# Patient Record
Sex: MALE | Race: BLACK OR AFRICAN AMERICAN | NOT HISPANIC OR LATINO | Employment: FULL TIME | URBAN - METROPOLITAN AREA
[De-identification: names, ages, dates, MRNs, and addresses within clinical notes are randomized per-mention and may not be internally consistent; named-entity substitution may affect disease eponyms.]

---

## 2023-04-18 LAB
ALANINE AMINOTRANSFERASE (SGPT) (U/L) IN SER/PLAS: 25 U/L (ref 10–52)
ALBUMIN (G/DL) IN SER/PLAS: 4 G/DL (ref 3.4–5)
ALKALINE PHOSPHATASE (U/L) IN SER/PLAS: 104 U/L (ref 33–136)
ANION GAP IN SER/PLAS: 12 MMOL/L (ref 10–20)
ASPARTATE AMINOTRANSFERASE (SGOT) (U/L) IN SER/PLAS: 19 U/L (ref 9–39)
BILIRUBIN TOTAL (MG/DL) IN SER/PLAS: 0.6 MG/DL (ref 0–1.2)
CALCIUM (MG/DL) IN SER/PLAS: 9.3 MG/DL (ref 8.6–10.6)
CARBON DIOXIDE, TOTAL (MMOL/L) IN SER/PLAS: 29 MMOL/L (ref 21–32)
CHLORIDE (MMOL/L) IN SER/PLAS: 104 MMOL/L (ref 98–107)
CREATININE (MG/DL) IN SER/PLAS: 0.96 MG/DL (ref 0.5–1.3)
ESTIMATED AVERAGE GLUCOSE FOR HBA1C: 166 MG/DL
GFR MALE: 90 ML/MIN/1.73M2
GLUCOSE (MG/DL) IN SER/PLAS: 155 MG/DL (ref 74–99)
HEMOGLOBIN A1C/HEMOGLOBIN TOTAL IN BLOOD: 7.4 %
POTASSIUM (MMOL/L) IN SER/PLAS: 4.4 MMOL/L (ref 3.5–5.3)
PROTEIN TOTAL: 6.7 G/DL (ref 6.4–8.2)
SODIUM (MMOL/L) IN SER/PLAS: 141 MMOL/L (ref 136–145)
UREA NITROGEN (MG/DL) IN SER/PLAS: 12 MG/DL (ref 6–23)

## 2023-06-01 LAB
PROSTATE SPECIFIC AG (NG/ML) IN SER/PLAS: 4.17 NG/ML (ref 0–4)
TESTOSTERONE (NG/DL) IN SER/PLAS: 171 NG/DL (ref 240–1000)

## 2023-06-16 PROBLEM — M67.88 ACHILLES TENDONOSIS OF RIGHT LOWER EXTREMITY: Status: ACTIVE | Noted: 2023-06-16

## 2023-06-16 PROBLEM — H26.9 CORTICAL CATARACT: Status: ACTIVE | Noted: 2023-06-16

## 2023-06-16 PROBLEM — L60.0 INGROWING TOENAIL: Status: ACTIVE | Noted: 2023-06-16

## 2023-06-16 PROBLEM — N52.9 ERECTILE DYSFUNCTION: Status: ACTIVE | Noted: 2023-06-16

## 2023-06-16 PROBLEM — E78.5 HYPERLIPIDEMIA: Status: ACTIVE | Noted: 2023-06-16

## 2023-06-16 PROBLEM — N47.1 PHIMOSIS: Status: ACTIVE | Noted: 2023-06-16

## 2023-06-16 PROBLEM — R31.0 GROSS HEMATURIA: Status: ACTIVE | Noted: 2023-06-16

## 2023-06-16 PROBLEM — H43.391 VITREOUS FLOATERS OF RIGHT EYE: Status: ACTIVE | Noted: 2023-06-16

## 2023-06-16 PROBLEM — R97.20 ELEVATED PSA: Status: ACTIVE | Noted: 2023-06-16

## 2023-06-16 PROBLEM — H26.9 CATARACT, RIGHT: Status: ACTIVE | Noted: 2023-06-16

## 2023-06-16 PROBLEM — E55.9 VITAMIN D INSUFFICIENCY: Status: ACTIVE | Noted: 2023-06-16

## 2023-06-16 PROBLEM — M25.579 ANKLE PAIN: Status: ACTIVE | Noted: 2023-06-16

## 2023-06-16 PROBLEM — E66.9 OBESITY: Status: ACTIVE | Noted: 2023-06-16

## 2023-06-16 PROBLEM — E66.01 TYPE 2 DIABETES MELLITUS WITH MORBID OBESITY (MULTI): Status: ACTIVE | Noted: 2023-06-16

## 2023-06-16 PROBLEM — M76.60 ACHILLES TENDINITIS: Status: ACTIVE | Noted: 2023-06-16

## 2023-06-16 PROBLEM — E11.9 DIABETES MELLITUS (MULTI): Status: ACTIVE | Noted: 2023-06-16

## 2023-06-16 PROBLEM — H25.12 AGE-RELATED NUCLEAR CATARACT OF LEFT EYE: Status: ACTIVE | Noted: 2023-06-16

## 2023-06-16 PROBLEM — G47.30 SLEEP APNEA: Status: ACTIVE | Noted: 2023-06-16

## 2023-06-16 PROBLEM — I10 HYPERTENSION: Status: ACTIVE | Noted: 2023-06-16

## 2023-06-16 PROBLEM — H61.22 IMPACTED CERUMEN OF LEFT EAR: Status: ACTIVE | Noted: 2023-06-16

## 2023-06-16 PROBLEM — F41.9 ANXIETY: Status: ACTIVE | Noted: 2023-06-16

## 2023-06-16 PROBLEM — E11.69 TYPE 2 DIABETES MELLITUS WITH MORBID OBESITY (MULTI): Status: ACTIVE | Noted: 2023-06-16

## 2023-06-16 PROBLEM — E29.1 HYPOGONADISM MALE: Status: ACTIVE | Noted: 2023-06-16

## 2023-06-16 PROBLEM — H02.889 MEIBOMIAN GLAND DYSFUNCTION (MGD): Status: ACTIVE | Noted: 2023-06-16

## 2023-06-16 PROBLEM — H00.12 CHALAZION OF RIGHT LOWER EYELID: Status: ACTIVE | Noted: 2023-06-16

## 2023-06-16 PROBLEM — G47.33 OSA (OBSTRUCTIVE SLEEP APNEA): Status: ACTIVE | Noted: 2023-06-16

## 2023-06-16 PROBLEM — N40.1 ENLARGED PROSTATE WITH LOWER URINARY TRACT SYMPTOMS (LUTS): Status: ACTIVE | Noted: 2023-06-16

## 2023-06-16 PROBLEM — H00.11 CHALAZION OF RIGHT UPPER EYELID: Status: ACTIVE | Noted: 2023-06-16

## 2023-06-16 PROBLEM — R32 URINARY INCONTINENCE: Status: ACTIVE | Noted: 2023-06-16

## 2023-06-16 PROBLEM — Z98.49 S/P CATARACT SURGERY: Status: ACTIVE | Noted: 2023-06-16

## 2023-06-16 RX ORDER — TAMSULOSIN HYDROCHLORIDE 0.4 MG/1
CAPSULE ORAL
COMMUNITY
Start: 2015-10-09 | End: 2023-11-06 | Stop reason: SDUPTHER

## 2023-06-16 RX ORDER — ASPIRIN 81 MG/1
1 TABLET ORAL DAILY
COMMUNITY
Start: 2013-12-10

## 2023-06-16 RX ORDER — INSULIN ASPART 100 [IU]/ML
INJECTION, SOLUTION INTRAVENOUS; SUBCUTANEOUS
COMMUNITY
Start: 2016-01-20

## 2023-06-16 RX ORDER — SILDENAFIL 50 MG/1
TABLET, FILM COATED ORAL
Status: ON HOLD | COMMUNITY
Start: 2020-08-31 | End: 2024-01-08 | Stop reason: ALTCHOICE

## 2023-06-16 RX ORDER — INSULIN GLARGINE 100 [IU]/ML
INJECTION, SOLUTION SUBCUTANEOUS
COMMUNITY
Start: 2013-12-10 | End: 2024-02-13 | Stop reason: SDUPTHER

## 2023-06-16 RX ORDER — SEMAGLUTIDE 2.68 MG/ML
INJECTION, SOLUTION SUBCUTANEOUS
COMMUNITY
Start: 2022-01-25 | End: 2024-06-06 | Stop reason: SDUPTHER

## 2023-06-16 RX ORDER — LISINOPRIL 20 MG/1
1 TABLET ORAL DAILY
COMMUNITY
Start: 2013-12-10 | End: 2024-02-05 | Stop reason: SDUPTHER

## 2023-06-16 RX ORDER — METFORMIN HYDROCHLORIDE 1000 MG/1
1 TABLET ORAL 2 TIMES DAILY
COMMUNITY
Start: 2013-11-22 | End: 2023-10-12

## 2023-06-16 RX ORDER — SILDENAFIL 100 MG/1
TABLET, FILM COATED ORAL
COMMUNITY
Start: 2022-09-27 | End: 2023-06-23 | Stop reason: SINTOL

## 2023-06-16 RX ORDER — TESTOSTERONE CYPIONATE 200 MG/ML
INJECTION, SOLUTION INTRAMUSCULAR
COMMUNITY
Start: 2014-09-05 | End: 2023-06-23 | Stop reason: ALTCHOICE

## 2023-06-16 RX ORDER — ATORVASTATIN CALCIUM 10 MG/1
1 TABLET, FILM COATED ORAL DAILY
COMMUNITY
Start: 2016-07-12 | End: 2023-06-26 | Stop reason: SDUPTHER

## 2023-06-16 RX ORDER — TADALAFIL 5 MG/1
1 TABLET ORAL DAILY
COMMUNITY
Start: 2023-01-10 | End: 2023-06-23 | Stop reason: ALTCHOICE

## 2023-06-23 ENCOUNTER — OFFICE VISIT (OUTPATIENT)
Dept: PRIMARY CARE | Facility: CLINIC | Age: 63
End: 2023-06-23
Payer: COMMERCIAL

## 2023-06-23 VITALS
RESPIRATION RATE: 16 BRPM | WEIGHT: 197 LBS | HEART RATE: 84 BPM | SYSTOLIC BLOOD PRESSURE: 132 MMHG | HEIGHT: 64 IN | BODY MASS INDEX: 33.63 KG/M2 | DIASTOLIC BLOOD PRESSURE: 78 MMHG | OXYGEN SATURATION: 97 %

## 2023-06-23 DIAGNOSIS — E11.69 TYPE 2 DIABETES MELLITUS WITH MORBID OBESITY (MULTI): Primary | ICD-10-CM

## 2023-06-23 DIAGNOSIS — I10 PRIMARY HYPERTENSION: ICD-10-CM

## 2023-06-23 DIAGNOSIS — E78.2 MIXED HYPERLIPIDEMIA: ICD-10-CM

## 2023-06-23 DIAGNOSIS — E66.01 TYPE 2 DIABETES MELLITUS WITH MORBID OBESITY (MULTI): Primary | ICD-10-CM

## 2023-06-23 DIAGNOSIS — G47.33 OSA (OBSTRUCTIVE SLEEP APNEA): ICD-10-CM

## 2023-06-23 DIAGNOSIS — H43.391 VITREOUS FLOATERS OF RIGHT EYE: ICD-10-CM

## 2023-06-23 DIAGNOSIS — Z00.00 HEALTHCARE MAINTENANCE: ICD-10-CM

## 2023-06-23 PROBLEM — M67.88 ACHILLES TENDONOSIS OF RIGHT LOWER EXTREMITY: Status: RESOLVED | Noted: 2023-06-16 | Resolved: 2023-06-23

## 2023-06-23 PROBLEM — F41.9 ANXIETY: Status: RESOLVED | Noted: 2023-06-16 | Resolved: 2023-06-23

## 2023-06-23 PROBLEM — M76.60 ACHILLES TENDINITIS: Status: RESOLVED | Noted: 2023-06-16 | Resolved: 2023-06-23

## 2023-06-23 PROBLEM — E11.9 DIABETES MELLITUS (MULTI): Status: RESOLVED | Noted: 2023-06-16 | Resolved: 2023-06-23

## 2023-06-23 PROBLEM — M25.579 ANKLE PAIN: Status: RESOLVED | Noted: 2023-06-16 | Resolved: 2023-06-23

## 2023-06-23 PROCEDURE — 3078F DIAST BP <80 MM HG: CPT | Performed by: FAMILY MEDICINE

## 2023-06-23 PROCEDURE — 1036F TOBACCO NON-USER: CPT | Performed by: FAMILY MEDICINE

## 2023-06-23 PROCEDURE — 99214 OFFICE O/P EST MOD 30 MIN: CPT | Performed by: FAMILY MEDICINE

## 2023-06-23 PROCEDURE — 4010F ACE/ARB THERAPY RXD/TAKEN: CPT | Performed by: FAMILY MEDICINE

## 2023-06-23 PROCEDURE — 3051F HG A1C>EQUAL 7.0%<8.0%: CPT | Performed by: FAMILY MEDICINE

## 2023-06-23 PROCEDURE — 3075F SYST BP GE 130 - 139MM HG: CPT | Performed by: FAMILY MEDICINE

## 2023-06-23 ASSESSMENT — ENCOUNTER SYMPTOMS
SORE THROAT: 0
CHEST TIGHTNESS: 0
NERVOUS/ANXIOUS: 0
CONSTIPATION: 0
ARTHRALGIAS: 0
BACK PAIN: 0
BRUISES/BLEEDS EASILY: 0
WEAKNESS: 0
COUGH: 0
BLOOD IN STOOL: 0
CHILLS: 0
SHORTNESS OF BREATH: 0
DYSPHORIC MOOD: 0
DIFFICULTY URINATING: 0
DIZZINESS: 0
ABDOMINAL DISTENTION: 0
EYE REDNESS: 0
ADENOPATHY: 0
FEVER: 0
DYSURIA: 0
DIARRHEA: 0
HEADACHES: 0
APPETITE CHANGE: 0
ABDOMINAL PAIN: 0
EYE PAIN: 0
FATIGUE: 0

## 2023-06-23 NOTE — PROGRESS NOTES
Subjective   Patient ID: Ko Porter is a 63 y.o. male who presents for Follow-up.  Pt is here for f/u Type 2 diabetes.   Pt is usually following low carb diet, and is exercising 0 days per week.  Taking Metformin, Basaglar, Novolog,and Ozempic ( started  1.5 months ago and down 23#).   Doing continuous glucose monitor .   A1c 7.4  Eye exam is overdue, was seeing specialist  Pt does not have foot pain, paresthesias, or numbness in feet. Foot checks daily - yes  .  Following with podiatry -  no .   Denies vision changes, abdominal pain, excessive thirst, frequent urination.     Pt has chronic HTN.  Pt is taking Lisinopril. Tolerating well.  Exercising 0 days per week   Low sodium diet is usually being followed.   Is not monitoring home blood pressures.  Denies HA, vision changes or CP.     Pt has Dyslipidemia.   Lipid panel is overdue to recheck.  Currently taking Atorvastatin and is tolerating well without muscle pains or weakness.     BPH is well controlled with Tamsulosin . Last PSA was higher at 4, to have MRI prostate . Pt has 3 x nightly nocturia. Urinary stream is good and he denies difficulty starting urination or emptying bladder. Also has low T but needs MRI before stating treatment.    For LEO pt is using CPAP, due to have repeat sleep study. Tolerating well without issues. Daytime energy is good.     Review of Systems   Constitutional:  Negative for appetite change, chills, fatigue and fever.   HENT:  Negative for congestion, hearing loss and sore throat.    Eyes:  Negative for pain, redness and visual disturbance.   Respiratory:  Negative for cough, chest tightness and shortness of breath.    Cardiovascular:  Negative for chest pain and leg swelling.   Gastrointestinal:  Negative for abdominal distention, abdominal pain, blood in stool, constipation and diarrhea.   Genitourinary:  Negative for difficulty urinating and dysuria.   Musculoskeletal:  Negative for arthralgias and back pain.   Skin:   "Negative for rash.   Neurological:  Negative for dizziness, weakness and headaches.   Hematological:  Negative for adenopathy. Does not bruise/bleed easily.   Psychiatric/Behavioral:  Negative for dysphoric mood. The patient is not nervous/anxious.        Objective   /78   Pulse 84   Resp 16   Ht 1.626 m (5' 4\")   Wt 89.4 kg (197 lb)   SpO2 97%   BMI 33.81 kg/m²    Physical Exam  Constitutional:       General: He is not in acute distress.     Appearance: Normal appearance. He is not ill-appearing.   HENT:      Head: Normocephalic and atraumatic.      Right Ear: Tympanic membrane, ear canal and external ear normal.      Left Ear: Tympanic membrane, ear canal and external ear normal.      Nose: Nose normal.      Mouth/Throat:      Mouth: Mucous membranes are moist.      Pharynx: No oropharyngeal exudate or posterior oropharyngeal erythema.   Eyes:      Extraocular Movements: Extraocular movements intact.      Conjunctiva/sclera: Conjunctivae normal.      Pupils: Pupils are equal, round, and reactive to light.   Neck:      Vascular: No carotid bruit.   Cardiovascular:      Rate and Rhythm: Normal rate and regular rhythm.      Heart sounds: Normal heart sounds. No murmur heard.  Pulmonary:      Breath sounds: Normal breath sounds. No wheezing, rhonchi or rales.   Abdominal:      General: Bowel sounds are normal. There is no distension.      Palpations: Abdomen is soft. There is no mass.      Tenderness: There is no abdominal tenderness.   Musculoskeletal:         General: No swelling or deformity.      Cervical back: Neck supple. No tenderness.   Lymphadenopathy:      Cervical: No cervical adenopathy.   Skin:     General: Skin is warm and dry.      Findings: No lesion or rash.   Neurological:      Mental Status: He is alert and oriented to person, place, and time.      Sensory: No sensory deficit.      Motor: No weakness.      Coordination: Coordination normal.      Deep Tendon Reflexes: Reflexes normal. "   Psychiatric:         Mood and Affect: Mood normal.         Behavior: Behavior normal.         Judgment: Judgment normal.           Assessment/Plan   Diagnoses and all orders for this visit:  Type 2 diabetes mellitus with morbid obesity (CMS/[facility]) - improving with Ozempic, doing well losing weight. Keep fu with Dr Freeman.  -     TSH with reflex to Free T4 if abnormal; Future  -     Referral to Ophthalmology; Future  -     Hemoglobin A1c; Future  Primary hypertension - well controlled, continue Lisinopril and monitor  -     CBC; Future  -     Urinalysis with Reflex Microscopic; Future  -     Comprehensive Metabolic Panel; Future  Mixed hyperlipidemia - doing well on statin, continue and monitor  -     Lipid Panel; Future  Vitreous floaters of right eye  -     Referral to Ophthalmology; Future  LEO (obstructive sleep apnea)  -     Referral to Adult Sleep Medicine; Future  Ordering CAC score    Follow up in January, 30min physical

## 2023-06-23 NOTE — PROGRESS NOTES
"Subjective   Patient ID: oK Porter is a 63 y.o. male who presents for Follow-up.    HPI     Review of Systems    Objective   /78   Pulse 84   Resp 16   Ht 1.626 m (5' 4\")   Wt 89.4 kg (197 lb)   SpO2 97%   BMI 33.81 kg/m²     Physical Exam    Assessment/Plan          "

## 2023-06-26 ENCOUNTER — TELEPHONE (OUTPATIENT)
Dept: PRIMARY CARE | Facility: CLINIC | Age: 63
End: 2023-06-26
Payer: COMMERCIAL

## 2023-06-26 DIAGNOSIS — I25.10 CORONARY ARTERY DISEASE INVOLVING NATIVE CORONARY ARTERY OF NATIVE HEART WITHOUT ANGINA PECTORIS: Primary | ICD-10-CM

## 2023-06-26 RX ORDER — ATORVASTATIN CALCIUM 10 MG/1
10 TABLET, FILM COATED ORAL DAILY
Qty: 90 TABLET | Refills: 1 | Status: SHIPPED | OUTPATIENT
Start: 2023-06-26 | End: 2024-02-05 | Stop reason: SDUPTHER

## 2023-06-26 NOTE — TELEPHONE ENCOUNTER
----- Message from Enmanuel Lui MD sent at 6/26/2023  7:29 AM EDT -----  Coronary artery calcium score is high which does indicate a higher risk of CV disease (heart attack, stroke). Recommend healthy diet, regular exercise and maintaining healthy blood pressure and cholesterol levels.   Atrovastatin should be increased to 20mg daily and referral done to cardiology  ----- Message -----  From: [employer] - Radiology Results In  Sent: 6/26/2023   7:09 AM EDT  To: nEmanuel Lui MD

## 2023-07-21 LAB
ALANINE AMINOTRANSFERASE (SGPT) (U/L) IN SER/PLAS: 16 U/L (ref 10–52)
ALBUMIN (G/DL) IN SER/PLAS: 4.2 G/DL (ref 3.4–5)
ALBUMIN (MG/L) IN URINE: 9.6 MG/L
ALBUMIN (MG/L) IN URINE: NORMAL
ALBUMIN/CREATININE (UG/MG) IN URINE: 11.4 UG/MG CRT (ref 0–30)
ALBUMIN/CREATININE (UG/MG) IN URINE: NORMAL
ALKALINE PHOSPHATASE (U/L) IN SER/PLAS: 87 U/L (ref 33–136)
ANION GAP IN SER/PLAS: 16 MMOL/L (ref 10–20)
ASPARTATE AMINOTRANSFERASE (SGOT) (U/L) IN SER/PLAS: 13 U/L (ref 9–39)
BILIRUBIN TOTAL (MG/DL) IN SER/PLAS: 0.9 MG/DL (ref 0–1.2)
CALCIUM (MG/DL) IN SER/PLAS: 9.5 MG/DL (ref 8.6–10.6)
CARBON DIOXIDE, TOTAL (MMOL/L) IN SER/PLAS: 25 MMOL/L (ref 21–32)
CHLORIDE (MMOL/L) IN SER/PLAS: 105 MMOL/L (ref 98–107)
CHOLESTEROL (MG/DL) IN SER/PLAS: 121 MG/DL (ref 0–199)
CHOLESTEROL IN HDL (MG/DL) IN SER/PLAS: 31.3 MG/DL
CHOLESTEROL/HDL RATIO: 3.9
CREATININE (MG/DL) IN SER/PLAS: 1.01 MG/DL (ref 0.5–1.3)
CREATININE (MG/DL) IN URINE: 84.4 MG/DL (ref 20–370)
CREATININE (MG/DL) IN URINE: NORMAL
ERYTHROCYTE DISTRIBUTION WIDTH (RATIO) BY AUTOMATED COUNT: 14.6 % (ref 11.5–14.5)
ERYTHROCYTE MEAN CORPUSCULAR HEMOGLOBIN CONCENTRATION (G/DL) BY AUTOMATED: 29.7 G/DL (ref 32–36)
ERYTHROCYTE MEAN CORPUSCULAR VOLUME (FL) BY AUTOMATED COUNT: 88 FL (ref 80–100)
ERYTHROCYTES (10*6/UL) IN BLOOD BY AUTOMATED COUNT: 5.57 X10E12/L (ref 4.5–5.9)
ESTIMATED AVERAGE GLUCOSE FOR HBA1C: 154 MG/DL
GFR MALE: 85 ML/MIN/1.73M2
GLUCOSE (MG/DL) IN SER/PLAS: 173 MG/DL (ref 74–99)
HEMATOCRIT (%) IN BLOOD BY AUTOMATED COUNT: 48.9 % (ref 41–52)
HEMOGLOBIN (G/DL) IN BLOOD: 14.5 G/DL (ref 13.5–17.5)
HEMOGLOBIN A1C/HEMOGLOBIN TOTAL IN BLOOD: 7 %
LDL: 50 MG/DL (ref 0–99)
LEUKOCYTES (10*3/UL) IN BLOOD BY AUTOMATED COUNT: 7.9 X10E9/L (ref 4.4–11.3)
NRBC (PER 100 WBCS) BY AUTOMATED COUNT: 0 /100 WBC (ref 0–0)
PLATELETS (10*3/UL) IN BLOOD AUTOMATED COUNT: 268 X10E9/L (ref 150–450)
POTASSIUM (MMOL/L) IN SER/PLAS: 4.2 MMOL/L (ref 3.5–5.3)
PROTEIN TOTAL: 7.2 G/DL (ref 6.4–8.2)
SODIUM (MMOL/L) IN SER/PLAS: 142 MMOL/L (ref 136–145)
TRIGLYCERIDE (MG/DL) IN SER/PLAS: 199 MG/DL (ref 0–149)
UREA NITROGEN (MG/DL) IN SER/PLAS: 13 MG/DL (ref 6–23)
VLDL: 40 MG/DL (ref 0–40)

## 2023-10-12 DIAGNOSIS — E11.69 TYPE 2 DIABETES MELLITUS WITH MORBID OBESITY (MULTI): Primary | ICD-10-CM

## 2023-10-12 DIAGNOSIS — E66.01 TYPE 2 DIABETES MELLITUS WITH MORBID OBESITY (MULTI): Primary | ICD-10-CM

## 2023-10-12 RX ORDER — CEFAZOLIN SODIUM 2 G/100ML
2 INJECTION, SOLUTION INTRAVENOUS ONCE
Status: CANCELLED | OUTPATIENT
Start: 2023-10-12 | End: 2023-10-12

## 2023-10-12 RX ORDER — METFORMIN HYDROCHLORIDE 1000 MG/1
1000 TABLET ORAL 2 TIMES DAILY
Qty: 180 TABLET | Refills: 3 | Status: SHIPPED | OUTPATIENT
Start: 2023-10-12

## 2023-10-13 ENCOUNTER — ANESTHESIA EVENT (OUTPATIENT)
Dept: OPERATING ROOM | Facility: HOSPITAL | Age: 63
End: 2023-10-13
Payer: COMMERCIAL

## 2023-10-13 ENCOUNTER — ANESTHESIA (OUTPATIENT)
Dept: OPERATING ROOM | Facility: HOSPITAL | Age: 63
End: 2023-10-13
Payer: COMMERCIAL

## 2023-10-13 ENCOUNTER — HOSPITAL ENCOUNTER (OUTPATIENT)
Facility: HOSPITAL | Age: 63
Setting detail: OUTPATIENT SURGERY
Discharge: HOME | End: 2023-10-13
Attending: STUDENT IN AN ORGANIZED HEALTH CARE EDUCATION/TRAINING PROGRAM | Admitting: STUDENT IN AN ORGANIZED HEALTH CARE EDUCATION/TRAINING PROGRAM
Payer: COMMERCIAL

## 2023-10-13 VITALS
WEIGHT: 176.81 LBS | SYSTOLIC BLOOD PRESSURE: 130 MMHG | TEMPERATURE: 97.9 F | RESPIRATION RATE: 20 BRPM | DIASTOLIC BLOOD PRESSURE: 74 MMHG | HEART RATE: 76 BPM | BODY MASS INDEX: 30.19 KG/M2 | HEIGHT: 64 IN | OXYGEN SATURATION: 99 %

## 2023-10-13 DIAGNOSIS — R97.20 PSA ELEVATION: Primary | ICD-10-CM

## 2023-10-13 DIAGNOSIS — R97.20 ELEVATED PROSTATE SPECIFIC ANTIGEN (PSA): ICD-10-CM

## 2023-10-13 LAB
GLUCOSE BLD MANUAL STRIP-MCNC: 104 MG/DL (ref 74–99)
GLUCOSE BLD MANUAL STRIP-MCNC: 181 MG/DL (ref 74–99)

## 2023-10-13 PROCEDURE — 7100000010 HC PHASE TWO TIME - EACH INCREMENTAL 1 MINUTE: Performed by: STUDENT IN AN ORGANIZED HEALTH CARE EDUCATION/TRAINING PROGRAM

## 2023-10-13 PROCEDURE — 2500000005 HC RX 250 GENERAL PHARMACY W/O HCPCS: Performed by: NURSE ANESTHETIST, CERTIFIED REGISTERED

## 2023-10-13 PROCEDURE — 2580000001 HC RX 258 IV SOLUTIONS: Performed by: NURSE ANESTHETIST, CERTIFIED REGISTERED

## 2023-10-13 PROCEDURE — 3700000001 HC GENERAL ANESTHESIA TIME - INITIAL BASE CHARGE: Performed by: STUDENT IN AN ORGANIZED HEALTH CARE EDUCATION/TRAINING PROGRAM

## 2023-10-13 PROCEDURE — 82947 ASSAY GLUCOSE BLOOD QUANT: CPT

## 2023-10-13 PROCEDURE — 2720000007 HC OR 272 NO HCPCS: Performed by: STUDENT IN AN ORGANIZED HEALTH CARE EDUCATION/TRAINING PROGRAM

## 2023-10-13 PROCEDURE — 3600000002 HC OR TIME - INITIAL BASE CHARGE - PROCEDURE LEVEL TWO: Performed by: STUDENT IN AN ORGANIZED HEALTH CARE EDUCATION/TRAINING PROGRAM

## 2023-10-13 PROCEDURE — 88305 TISSUE EXAM BY PATHOLOGIST: CPT | Mod: TC,59,AHULAB | Performed by: STUDENT IN AN ORGANIZED HEALTH CARE EDUCATION/TRAINING PROGRAM

## 2023-10-13 PROCEDURE — 55700 PR PROSTATE NEEDLE BIOPSY ANY APPROACH: CPT | Performed by: STUDENT IN AN ORGANIZED HEALTH CARE EDUCATION/TRAINING PROGRAM

## 2023-10-13 PROCEDURE — 7100000001 HC RECOVERY ROOM TIME - INITIAL BASE CHARGE: Performed by: STUDENT IN AN ORGANIZED HEALTH CARE EDUCATION/TRAINING PROGRAM

## 2023-10-13 PROCEDURE — 3600000007 HC OR TIME - EACH INCREMENTAL 1 MINUTE - PROCEDURE LEVEL TWO: Performed by: STUDENT IN AN ORGANIZED HEALTH CARE EDUCATION/TRAINING PROGRAM

## 2023-10-13 PROCEDURE — 88305 TISSUE EXAM BY PATHOLOGIST: CPT | Performed by: PATHOLOGY

## 2023-10-13 PROCEDURE — A55700 PR BIOPSY OF PROSTATE,NEEDLE/PUNCH: Performed by: NURSE ANESTHETIST, CERTIFIED REGISTERED

## 2023-10-13 PROCEDURE — 3700000002 HC GENERAL ANESTHESIA TIME - EACH INCREMENTAL 1 MINUTE: Performed by: STUDENT IN AN ORGANIZED HEALTH CARE EDUCATION/TRAINING PROGRAM

## 2023-10-13 PROCEDURE — 2500000004 HC RX 250 GENERAL PHARMACY W/ HCPCS (ALT 636 FOR OP/ED): Performed by: NURSE ANESTHETIST, CERTIFIED REGISTERED

## 2023-10-13 PROCEDURE — 7100000002 HC RECOVERY ROOM TIME - EACH INCREMENTAL 1 MINUTE: Performed by: STUDENT IN AN ORGANIZED HEALTH CARE EDUCATION/TRAINING PROGRAM

## 2023-10-13 PROCEDURE — 7100000009 HC PHASE TWO TIME - INITIAL BASE CHARGE: Performed by: STUDENT IN AN ORGANIZED HEALTH CARE EDUCATION/TRAINING PROGRAM

## 2023-10-13 PROCEDURE — 88305 TISSUE EXAM BY PATHOLOGIST: CPT | Mod: TC,SUR,AHULAB | Performed by: STUDENT IN AN ORGANIZED HEALTH CARE EDUCATION/TRAINING PROGRAM

## 2023-10-13 PROCEDURE — A55700 PR BIOPSY OF PROSTATE,NEEDLE/PUNCH: Performed by: STUDENT IN AN ORGANIZED HEALTH CARE EDUCATION/TRAINING PROGRAM

## 2023-10-13 PROCEDURE — 88344 IMHCHEM/IMCYTCHM EA MLT ANTB: CPT | Performed by: PATHOLOGY

## 2023-10-13 RX ORDER — DIPHENHYDRAMINE HYDROCHLORIDE 50 MG/ML
12.5 INJECTION INTRAMUSCULAR; INTRAVENOUS ONCE AS NEEDED
Status: CANCELLED | OUTPATIENT
Start: 2023-10-13

## 2023-10-13 RX ORDER — PROPOFOL 10 MG/ML
INJECTION, EMULSION INTRAVENOUS AS NEEDED
Status: DISCONTINUED | OUTPATIENT
Start: 2023-10-13 | End: 2023-10-13

## 2023-10-13 RX ORDER — OXYCODONE HYDROCHLORIDE 5 MG/1
5 TABLET ORAL EVERY 4 HOURS PRN
Status: CANCELLED | OUTPATIENT
Start: 2023-10-13

## 2023-10-13 RX ORDER — LABETALOL HYDROCHLORIDE 5 MG/ML
5 INJECTION, SOLUTION INTRAVENOUS ONCE AS NEEDED
Status: CANCELLED | OUTPATIENT
Start: 2023-10-13

## 2023-10-13 RX ORDER — SODIUM CHLORIDE, SODIUM LACTATE, POTASSIUM CHLORIDE, CALCIUM CHLORIDE 600; 310; 30; 20 MG/100ML; MG/100ML; MG/100ML; MG/100ML
100 INJECTION, SOLUTION INTRAVENOUS CONTINUOUS
Status: CANCELLED | OUTPATIENT
Start: 2023-10-13

## 2023-10-13 RX ORDER — LIDOCAINE HYDROCHLORIDE 10 MG/ML
0.1 INJECTION, SOLUTION EPIDURAL; INFILTRATION; INTRACAUDAL; PERINEURAL ONCE
Status: CANCELLED | OUTPATIENT
Start: 2023-10-13 | End: 2023-10-13

## 2023-10-13 RX ORDER — PROPOFOL 10 MG/ML
INJECTION, EMULSION INTRAVENOUS CONTINUOUS PRN
Status: DISCONTINUED | OUTPATIENT
Start: 2023-10-13 | End: 2023-10-13

## 2023-10-13 RX ORDER — SODIUM CHLORIDE, SODIUM LACTATE, POTASSIUM CHLORIDE, CALCIUM CHLORIDE 600; 310; 30; 20 MG/100ML; MG/100ML; MG/100ML; MG/100ML
100 INJECTION, SOLUTION INTRAVENOUS CONTINUOUS
Status: DISCONTINUED | OUTPATIENT
Start: 2023-10-13 | End: 2023-10-13 | Stop reason: HOSPADM

## 2023-10-13 RX ORDER — GLYCOPYRROLATE 0.2 MG/ML
INJECTION INTRAMUSCULAR; INTRAVENOUS AS NEEDED
Status: DISCONTINUED | OUTPATIENT
Start: 2023-10-13 | End: 2023-10-13

## 2023-10-13 RX ORDER — ONDANSETRON HYDROCHLORIDE 2 MG/ML
4 INJECTION, SOLUTION INTRAVENOUS ONCE AS NEEDED
Status: CANCELLED | OUTPATIENT
Start: 2023-10-13

## 2023-10-13 RX ORDER — LIDOCAINE HYDROCHLORIDE 10 MG/ML
INJECTION INFILTRATION; PERINEURAL AS NEEDED
Status: DISCONTINUED | OUTPATIENT
Start: 2023-10-13 | End: 2023-10-13

## 2023-10-13 RX ORDER — MIDAZOLAM HYDROCHLORIDE 1 MG/ML
INJECTION, SOLUTION INTRAMUSCULAR; INTRAVENOUS AS NEEDED
Status: DISCONTINUED | OUTPATIENT
Start: 2023-10-13 | End: 2023-10-13

## 2023-10-13 RX ADMIN — PROPOFOL 100 MCG/KG/MIN: 10 INJECTION, EMULSION INTRAVENOUS at 11:14

## 2023-10-13 RX ADMIN — GLYCOPYRROLATE 0.2 MG: 0.2 INJECTION INTRAMUSCULAR; INTRAVENOUS at 11:05

## 2023-10-13 RX ADMIN — SODIUM CHLORIDE, SODIUM LACTATE, POTASSIUM CHLORIDE, AND CALCIUM CHLORIDE: 600; 310; 30; 20 INJECTION, SOLUTION INTRAVENOUS at 11:05

## 2023-10-13 RX ADMIN — MIDAZOLAM HYDROCHLORIDE 2 MG: 1 INJECTION, SOLUTION INTRAMUSCULAR; INTRAVENOUS at 11:05

## 2023-10-13 RX ADMIN — LIDOCAINE HYDROCHLORIDE 100 MG: 10 INJECTION, SOLUTION INFILTRATION; PERINEURAL at 11:14

## 2023-10-13 RX ADMIN — PROPOFOL 50 MG: 10 INJECTION, EMULSION INTRAVENOUS at 11:14

## 2023-10-13 ASSESSMENT — PAIN - FUNCTIONAL ASSESSMENT
PAIN_FUNCTIONAL_ASSESSMENT: 0-10

## 2023-10-13 ASSESSMENT — PAIN SCALES - GENERAL
PAINLEVEL_OUTOF10: 0 - NO PAIN

## 2023-10-13 ASSESSMENT — COLUMBIA-SUICIDE SEVERITY RATING SCALE - C-SSRS
1. IN THE PAST MONTH, HAVE YOU WISHED YOU WERE DEAD OR WISHED YOU COULD GO TO SLEEP AND NOT WAKE UP?: NO
2. HAVE YOU ACTUALLY HAD ANY THOUGHTS OF KILLING YOURSELF?: NO
6. HAVE YOU EVER DONE ANYTHING, STARTED TO DO ANYTHING, OR PREPARED TO DO ANYTHING TO END YOUR LIFE?: NO

## 2023-10-13 NOTE — ANESTHESIA POSTPROCEDURE EVALUATION
Patient: Ko Porter    Procedure Summary       Date: 10/13/23 Room / Location: [facility] OR 01 / Virtual [facility] OR    Anesthesia Start: 1105 Anesthesia Stop: 1141    Procedure: Transperineal Fusion Prostate Biopsy Diagnosis:       Elevated prostate specific antigen (PSA)      (Elevated prostate specific antigen (PSA) [R97.20])    Surgeons: Russell Soni MD Responsible Provider: Cristopher Mcarthur MD    Anesthesia Type: MAC ASA Status: 3            Anesthesia Type: MAC    Vitals Value Taken Time   /74 10/13/23 1230   Temp 36.6 °C (97.9 °F) 10/13/23 1200   Pulse 76 10/13/23 1230   Resp 23 10/13/23 1230   SpO2 99 % 10/13/23 1230       Anesthesia Post Evaluation    Patient location during evaluation: bedside  Level of consciousness: awake  Pain management: adequate  Multimodal analgesia pain management approach  Cardiovascular status: acceptable  Respiratory status: acceptable  Hydration status: acceptable        There were no known notable events for this encounter.

## 2023-10-13 NOTE — H&P
"History Of Present Illness  Ko Porter is a 63 y.o. male presenting with elevated PSA here for transperineal prostate biopsy.     Past Medical History  Past Medical History:   Diagnosis Date    Achilles tendinitis 06/16/2023    Anxiety 06/16/2023    Diabetes mellitus (CMS/HCC) 06/16/2023    GERD (gastroesophageal reflux disease)     Hyperlipidemia     Hypertension        Surgical History  Past Surgical History:   Procedure Laterality Date    APPENDECTOMY  01/22/2016    Appendectomy    COLONOSCOPY  10/02/2013    Complete Colonoscopy    OTHER SURGICAL HISTORY Left 10/30/2020    Cataract surgery        Social History  He reports that he has never smoked. He has never used smokeless tobacco. He reports that he does not currently use alcohol. He reports that he does not use drugs.    Family History  Family History   Problem Relation Name Age of Onset    Diabetes Father      Cancer Maternal Grandfather          Allergies  Patient has no known allergies.    Review of Systems     Physical Exam   RRR  Unlabored breathing  Last Recorded Vitals  Blood pressure 110/62, pulse 56, temperature 36 °C (96.8 °F), temperature source Temporal, resp. rate 15, height 1.626 m (5' 4\"), weight 80.2 kg (176 lb 12.9 oz), SpO2 98 %.    Relevant Results        Prostate MRI with PIRADS 4 lesion and 2 PI RADS 3 lesions. Prostate size 73.9cc       Assessment/Plan   Active Problems:  There are no active Hospital Problems.      Transperineal fusion prostate biopsy           Je Whitlock MD    "

## 2023-10-13 NOTE — PERIOPERATIVE NURSING NOTE
1231: Patient in phase 2, handoff report received from Carlee BRAVO    1235: Patient ambulated to bathroom with RN assistance. Pt voided.    1240: Family at bedside, pt dressed with family assistance    1245: Pt ambulated to bathroom with family assistance. Pt voided and had stool occurrence    1250: Discharge instructions reviewed with patient and family, no further questions at this time.     1308: Peripheral IV removed with no complications.     1313: Patient to main lobby via transport with all belongings in stable condition. Phase 2 complete.

## 2023-10-13 NOTE — DISCHARGE INSTRUCTIONS
DEPARTMENT OF UROLOGY  DISCHARGE INSTRUCTIONS PROSTATE BIOPSY  Outpatient Surgery    C O N F I D E N T I A L   I N F O R M A T I O N    Ko T Johnson        Call [phone] during regular daytime business hours (8:00 am - 5:00 pm) and after 5:00 pm and ask for the Urology Resident with any questions or concerns.      If it is a life-threatening situation, proceed to the nearest emergency department.        Follow-up appointment:  ***     Thank you for the opportunity to care for you today.  Your health and healing are very important to us.  We hope we made you feel as comfortable as possible and are committed to your recovery and continued well-being.      The following is a brief overview of your prostate biopsy today. Some of the information contained on this summary may be confidential.  This information should be kept in your records and should be shared with your regular doctor.    Physicians:   Dr. Soni      Procedure performed: prostate biopsy  Pending results:   prostate pathology results    What to Expect During your Recovery and Home Care  Anesthesia Side Effects   You received anesthesia today.  You may feel sleepy, tired, or have a sore throat.   You may also feel drowsiness, dizziness, or inability to think clearly.  For your safety, do not drive, drink alcoholic beverages, take any unprescribed medication or make any important decisions for 24 hours.  A responsible adult should be with you for 24 hours.        Activity and Recovery    No heavy lifting or strenuous activity for several days. Avoid activities that put pressure on your rectal area. Do not have sex for a few days.      Pain Control  Unfortunately, you may experience pain after your procedure.  Adequate management can include alternative measures to help ease your pain and can include ice packs, and over the counter Tylenol can be taken as prescribed as needed for breakthrough pain. Do not take more then 4,000mg of Tylenol in a 24-hour  period.      Nausea/Vomiting   Clear liquids are best tolerated at first. Start slow, advance your diet as tolerated to normal foods. Avoid spicy, greasy, heavy foods at first. Also, you may feel nauseous or like you need to vomit if you take any type of medication on an empty stomach.  Call your physician if you are unable to eat or drink and have persistent vomiting.    Signs of Bleeding   Minor bleeding or drainage may occur from your rectum however, excessive or consistent bleeding should be reported to your surgeon. Excessive bleeding is defined as blood that is dripping from rectum, soaking through your pants, and is ketchup colored, thick with possible blood clots.  Consistent is defined as bleeding that does not stop. You may have blood in your poop, urine and semen for several weeks.     Treatment/wound care:   It is okay to shower 24 hours after time of surgery.      Signs of Infection  Signs of infection can include fever, burning sensation with urination, frequency, urgency or severe pain.  If you see any of these occur, please contact your doctor's office at [phone].  Any fever higher than 100.4, especially if associated with an ill feeling, abdominal pain, chills, or nausea should be reported to your surgeon.      Assist in bowel movements/urination  Increase fiber in diet  Urination should occur within 6 hours of anesthesia.   Increase water (6 to 8 glasses)  Increase walking   If you have tried these methods and your bladder still feels full and you cannot use the bathroom, please go to your nearest Emergency room.    Additional Instructions:   Try not to strain when going to the bathroom. Do not hold your urine. We will go over your biopsy results at your follow up appointment. It takes 7-10 business days for the results to come back.

## 2023-10-13 NOTE — ANESTHESIA PREPROCEDURE EVALUATION
Patient: Ko Porter    Procedure Information       Date/Time: 10/13/23 1030    Procedure: Transperineal Fusion Prostate Biopsy - [facility]; [id number]    Location: [facility] OR 01 / [facility] OR    Surgeons: Russell Soni MD          Htn t2dm gerd adina no cpap    Relevant Problems   Cardiovascular   (+) Hyperlipidemia   (+) Hypertension      Endocrine   (+) Obesity   (+) Type 2 diabetes mellitus with morbid obesity (CMS/HCC)      Pulmonary   (+) ADINA (obstructive sleep apnea)       Clinical information reviewed:   Tobacco  Allergies  Meds   Med Hx  Surg Hx   Fam Hx  Soc Hx        NPO Detail:  NPO/Void Status  Carbonhydrate Drink Given Prior to Surgery? : N  Date of Last Liquid: 10/12/23  Time of Last Liquid: 2200  Date of Last Solid: 10/12/23  Time of Last Solid: 2200  Last Intake Type: Clear fluids         PHYSICAL EXAM    Anesthesia Plan    ASA 3     MAC     intravenous induction   Anesthetic plan and risks discussed with patient.    Plan discussed with CRNA.

## 2023-10-13 NOTE — OP NOTE
Transperineal Fusion Prostate Biopsy Operative Note     Date: 10/13/2023  OR Location: [facility] A OR    Name: Ko Porter, : 1960, Age: 63 y.o., MRN: [id number], Sex: male    Diagnosis  Pre-op Diagnosis     * Elevated prostate specific antigen (PSA) [R97.20] Post-op Diagnosis     * Elevated prostate specific antigen (PSA) [R97.20]     Procedures    * Transperineal Fusion Prostate Biopsy    Surgeons      * Russell Soni - Primary    Resident/Fellow/Other Assistant:  No surgical staff documented.    Procedure Summary  Anesthesia: Monitor Anesthesia Care  ASA: III  Anesthesia Staff: Anesthesiologist: Cristopher Mcarthur MD  CRNA: EFRA Cardenas-CRNA  Estimated Blood Loss: minimal            Anesthesia Record               Intraprocedure I/O Totals          Intake    .00 mL    Propofol Drip 16.84 mL    The total shown is the total volume documented since Anesthesia Start was filed.    Total Intake 916.84 mL          Specimen:   ID Type Source Tests Collected by Time   1 : right posterior apex Tissue PROSTATE, BIOPSY, RIGHT LATERAL APEX SURGICAL PATHOLOGY EXAM Russell Soni MD 10/13/2023 1119   2 : right posterior base Tissue PROSTATE, BIOPSY, RIGHT LATERAL BASE SURGICAL PATHOLOGY EXAM Russell Soni MD 10/13/2023 1119   3 : right paramedian apex Tissue PROSTATE, BIOPSY, RIGHT MEDIAL APEX SURGICAL PATHOLOGY EXAM Russell Soni MD 10/13/2023 1048   4 : right paramedian base Tissue PROSTATE, BIOPSY, RIGHT MEDIAL BASE SURGICAL PATHOLOGY EXAM Russell Soni MD 10/13/2023 1048   5 : right lateral Tissue PROSTATE, BIOPSY, RIGHT LATERAL MID SURGICAL PATHOLOGY EXAM Russell Soni MD 10/13/2023 1048   6 : right anterior Tissue PROSTATE, BIOPSY, RIGHT MEDIAL MID SURGICAL PATHOLOGY EXAM Russell Soni MD 10/13/2023 1048   7 : left paramedian apex Tissue PROSTATE, BIOPSY, LEFT MEDIAL APEX SURGICAL PATHOLOGY EXAM Russell Soni MD 10/13/2023 1048   8 : left paramedian base Tissue PROSTATE, BIOPSY, LEFT MEDIAL  BASE SURGICAL PATHOLOGY EXAM Russell Soni MD 10/13/2023 1048   9 : left anterior Tissue PROSTATE, BIOPSY, LEFT MEDIAL MID SURGICAL PATHOLOGY EXAM Russell Soni MD 10/13/2023 1048   10 : left posterior base Tissue PROSTATE, BIOPSY, LEFT LATERAL BASE SURGICAL PATHOLOGY EXAM Russell Soni MD 10/13/2023 1048   11 : left posterior apex Tissue PROSTATE, BIOPSY, LEFT LATERAL APEX SURGICAL PATHOLOGY EXAM Russell Soni MD 10/13/2023 1048   12 : left lateral Tissue PROSTATE, BIOPSY, LEFT LATERAL MID SURGICAL PATHOLOGY EXAM Russell Soni MD 10/13/2023 1048   13 : 1/1 Tissue PROSTATE BIOPSY TARGETED MIGUEL SURGICAL PATHOLOGY EXAM Russell Soni MD 10/13/2023 1048   14 : 1/2 Tissue PROSTATE BIOPSY TARGETED MIGUEL SURGICAL PATHOLOGY EXAM Russell Soni MD 10/13/2023 1048   15 : 1/3 Tissue PROSTATE BIOPSY TARGETED MIGUEL SURGICAL PATHOLOGY EXAM Russell Soni MD 10/13/2023 1048   16 : 1/4 Tissue PROSTATE BIOPSY TARGETED MIGUEL SURGICAL PATHOLOGY EXAM Russell Soni MD 10/13/2023 1048   17 : 2/1 Tissue PROSTATE BIOPSY TARGETED MIGUEL SURGICAL PATHOLOGY EXAM Russell Soni MD 10/13/2023 1048   18 : 2/2 Tissue PROSTATE BIOPSY TARGETED MIGUEL SURGICAL PATHOLOGY EXAM Russell Soni MD 10/13/2023 1048   19 : 2/3 Tissue PROSTATE BIOPSY TARGETED MIGUEL SURGICAL PATHOLOGY EXAM Russell Soni MD 10/13/2023 1048   20 : 2/4 Tissue PROSTATE BIOPSY TARGETED MIGUEL SURGICAL PATHOLOGY EXAM Russell Soni MD 10/13/2023 1048   21 : 3/1 Tissue PROSTATE BIOPSY TARGETED MIGUEL SURGICAL PATHOLOGY EXAM Russell Soni MD 10/13/2023 1048   22 : 3/2 Tissue PROSTATE BIOPSY TARGETED MIGUEL SURGICAL PATHOLOGY EXAM Russell Soni MD 10/13/2023 1048   23 : 3/3 Tissue PROSTATE BIOPSY TARGETED MIGUEL SURGICAL PATHOLOGY EXAM Russell Soni MD 10/13/2023 1048   24 : 3/4 Tissue PROSTATE BIOPSY TARGETED MIGUEL SURGICAL PATHOLOGY EXAM Russell Soni MD 10/13/2023 1048        Staff:   Circulator: Odette Lombardo RN; Morena Sharpe RN  Scrub Person: Israel Bustillo    Preoperative Diagnosis: Elevated PSA;  abnormal prostate findings on MRI; here for prostate biopsy    Postoperative Diagnosis: Same    Operation Performed: MR/TRUS fusion guided biopsy via transperineal approach    Attending: Zachariah    Assistant(s):     Anesthesia: Sedation and Local    Preparation: Betadine    EBL: Minimal     Complications: None     Indications for procedure: This 63 y.o. year old male presents with a history of Elevated PSA.    MRI Findings: 3 separate lesions on prostate MRI    Procedure and Findings:     The rational for transrectal ultrasound and biopsy of the prostate including risk, benefits and alternatives were discussed. These included risk of increased urination, urinary retention, hematuria, hematospermia, and urosepsis, the patient elected to proceed.      The multiparametric MRI data was imported from the radiology PACS system to the behaview biopsy platform. The T2-weighted images were reviewed including the segmented prostate boundary and pre-identified suspicious lesions (ROIs).     A procedure time out confirmed the proper patient, and the procedure informed consent form had been signed by the patient.    The patient was placed in lithotomy position. A injection mixture of lidocaine, marcaine and sodium bicarbonate was used as local anesthetic for the skin of the perineum and to perform a periapical block.     The electromagnetic sensor was attached to the ultrasound probe. The ultrasound transducer was placed into the rectum. Positioning of the probe and sensor within the generated EM-field was confirmed.     A comprehensive TRUS survey was performed with the prostate visualized in both the transverse and sagittal planes. There were benign calcifications seen on ultrasound. In addition to the MRI fusion, there were hypoechoic lesions suspicious for tumor identified on ultrasound.     3D-Rendering with trus image processing and fusion:    In the axial plane, an ultrasound sweep of the prostate was completed from base to  apex. The serial axial image slices were marked by annotating the anterior, posterior, left, right, base and apical points for semi-automatic segmentation of the prostate. Manual adjustments of the prostate US segmentation was performed in the axial, sagittal and coronal views rendering a 3-D data set/US volume.     Initial rotational co-registration was performed by blending MR images that were overlaid on the US images. The urethra, bladder neck, bladder and posterior surface of the prostate were identified on both MRI and US.     The images were rotated to ensure corresponding anatomy was correctly aligned.     Elastic registration was then calculated for use if required.     The MRI and Ultrasound were then registered using co-display of the images verifying that base, apex, left and right sides of the prostate were correctly aligned. Manual corrections were performed where need. Additional co-registration of the internal fiducials including prostatic cyst and the pubis were performed.     Prostate Biopsy:     At this point, accurate co-registration/fusion was confirmed. Ultrasound was used to navigate to the centroid target and lesion volume. The segmented ROIs were targeted and biopsied with ultrasound guidance taking four cores from each target. I was satisfied with the location of the targeted biopsies obtained. An additional 12 core, standard, systematic random biopsy was performed for a total of 24 cores of tissue obtained during this biopsy session.      Disposition  Patient tolerated the procedure well and will follow-up for an outpatient appointment to discuss pathology.      Attending Attestation: I was present and scrubbed for the entire procedure.    Russell Soni  Phone Number: [phone]

## 2023-10-16 ASSESSMENT — PAIN SCALES - GENERAL: PAINLEVEL_OUTOF10: 0 - NO PAIN

## 2023-10-30 LAB
LAB AP ASR DISCLAIMER: NORMAL
LABORATORY COMMENT REPORT: NORMAL
PATH REPORT.FINAL DX SPEC: NORMAL
PATH REPORT.GROSS SPEC: NORMAL
PATH REPORT.RELEVANT HX SPEC: NORMAL
PATH REPORT.TOTAL CANCER: NORMAL

## 2023-11-06 ENCOUNTER — OFFICE VISIT (OUTPATIENT)
Dept: ENDOCRINOLOGY | Facility: CLINIC | Age: 63
End: 2023-11-06
Payer: COMMERCIAL

## 2023-11-06 ENCOUNTER — LAB (OUTPATIENT)
Dept: LAB | Facility: LAB | Age: 63
End: 2023-11-06
Payer: COMMERCIAL

## 2023-11-06 VITALS
DIASTOLIC BLOOD PRESSURE: 68 MMHG | RESPIRATION RATE: 16 BRPM | HEIGHT: 64 IN | WEIGHT: 176 LBS | BODY MASS INDEX: 30.05 KG/M2 | HEART RATE: 72 BPM | SYSTOLIC BLOOD PRESSURE: 120 MMHG

## 2023-11-06 DIAGNOSIS — E11.69 TYPE 2 DIABETES MELLITUS WITH MORBID OBESITY (MULTI): Primary | ICD-10-CM

## 2023-11-06 DIAGNOSIS — I10 PRIMARY HYPERTENSION: ICD-10-CM

## 2023-11-06 DIAGNOSIS — E66.01 TYPE 2 DIABETES MELLITUS WITH MORBID OBESITY (MULTI): ICD-10-CM

## 2023-11-06 DIAGNOSIS — E11.69 TYPE 2 DIABETES MELLITUS WITH MORBID OBESITY (MULTI): ICD-10-CM

## 2023-11-06 DIAGNOSIS — E78.2 MIXED HYPERLIPIDEMIA: ICD-10-CM

## 2023-11-06 DIAGNOSIS — E66.01 TYPE 2 DIABETES MELLITUS WITH MORBID OBESITY (MULTI): Primary | ICD-10-CM

## 2023-11-06 LAB
ALBUMIN SERPL BCP-MCNC: 4.4 G/DL (ref 3.4–5)
ALP SERPL-CCNC: 86 U/L (ref 33–136)
ALT SERPL W P-5'-P-CCNC: 11 U/L (ref 10–52)
ANION GAP SERPL CALC-SCNC: 19 MMOL/L (ref 10–20)
AST SERPL W P-5'-P-CCNC: 9 U/L (ref 9–39)
BILIRUB SERPL-MCNC: 0.6 MG/DL (ref 0–1.2)
BUN SERPL-MCNC: 15 MG/DL (ref 6–23)
CALCIUM SERPL-MCNC: 9.6 MG/DL (ref 8.6–10.6)
CHLORIDE SERPL-SCNC: 102 MMOL/L (ref 98–107)
CO2 SERPL-SCNC: 25 MMOL/L (ref 21–32)
CREAT SERPL-MCNC: 0.99 MG/DL (ref 0.5–1.3)
EST. AVERAGE GLUCOSE BLD GHB EST-MCNC: 151 MG/DL
GFR SERPL CREATININE-BSD FRML MDRD: 87 ML/MIN/1.73M*2
GLUCOSE SERPL-MCNC: 166 MG/DL (ref 74–99)
HBA1C MFR BLD: 6.9 %
POTASSIUM SERPL-SCNC: 4.7 MMOL/L (ref 3.5–5.3)
PROT SERPL-MCNC: 7 G/DL (ref 6.4–8.2)
SODIUM SERPL-SCNC: 141 MMOL/L (ref 136–145)

## 2023-11-06 PROCEDURE — 36415 COLL VENOUS BLD VENIPUNCTURE: CPT

## 2023-11-06 PROCEDURE — 3078F DIAST BP <80 MM HG: CPT | Performed by: INTERNAL MEDICINE

## 2023-11-06 PROCEDURE — 83036 HEMOGLOBIN GLYCOSYLATED A1C: CPT

## 2023-11-06 PROCEDURE — 3051F HG A1C>EQUAL 7.0%<8.0%: CPT | Performed by: INTERNAL MEDICINE

## 2023-11-06 PROCEDURE — 1036F TOBACCO NON-USER: CPT | Performed by: INTERNAL MEDICINE

## 2023-11-06 PROCEDURE — 3074F SYST BP LT 130 MM HG: CPT | Performed by: INTERNAL MEDICINE

## 2023-11-06 PROCEDURE — 99214 OFFICE O/P EST MOD 30 MIN: CPT | Performed by: INTERNAL MEDICINE

## 2023-11-06 PROCEDURE — 80053 COMPREHEN METABOLIC PANEL: CPT

## 2023-11-06 PROCEDURE — 4010F ACE/ARB THERAPY RXD/TAKEN: CPT | Performed by: INTERNAL MEDICINE

## 2023-11-06 RX ORDER — TAMSULOSIN HYDROCHLORIDE 0.4 MG/1
0.8 CAPSULE ORAL DAILY
Qty: 180 CAPSULE | Refills: 3 | Status: ON HOLD | OUTPATIENT
Start: 2023-11-06 | End: 2024-01-08 | Stop reason: ALTCHOICE

## 2023-11-06 RX ORDER — FLASH GLUCOSE SENSOR
KIT MISCELLANEOUS
COMMUNITY
Start: 2023-09-21 | End: 2024-05-28 | Stop reason: SDUPTHER

## 2023-11-06 ASSESSMENT — ENCOUNTER SYMPTOMS
FEVER: 0
SHORTNESS OF BREATH: 0
VOMITING: 0
CHILLS: 0
DIZZINESS: 0
NAUSEA: 0
DIARRHEA: 0
LIGHT-HEADEDNESS: 0

## 2023-11-06 NOTE — PROGRESS NOTES
"Subjective   Patient ID: Ko Porter is a 63 y.o. male who presents for Diabetes (Type 2), Hyperlipidemia, and Hypertension.  HPI  Since last visit continues to do great with sugars since increase in ozempic to 2 mg and add of jardiance.  Some lows in pm.  Current insulin at 46 basal and meal 20 bid.   Feels well. Had recent prostate bx: meeting with uro soon.    Utd with eye exam    Review of Systems   Constitutional:  Negative for chills and fever.   Respiratory:  Negative for shortness of breath.    Gastrointestinal:  Negative for diarrhea, nausea and vomiting.   Endocrine: Negative for cold intolerance and heat intolerance.   Neurological:  Negative for dizziness and light-headedness.       Objective     11/6/2023 9:36 AM    /68   Pulse 72   Resp 16   Weight 79.8 kg (176 lb)   Height 1.626 m (5' 4\")         Physical Exam  Constitutional:       Appearance: Normal appearance. He is overweight.   HENT:      Head: Normocephalic and atraumatic.   Neck:      Thyroid: No thyroid mass, thyromegaly or thyroid tenderness.   Cardiovascular:      Rate and Rhythm: Normal rate and regular rhythm.      Heart sounds: No murmur heard.     No gallop.   Pulmonary:      Effort: Pulmonary effort is normal.      Breath sounds: Normal breath sounds.   Abdominal:      Palpations: Abdomen is soft.      Comments: benign   Neurological:      General: No focal deficit present.      Mental Status: He is alert and oriented to person, place, and time.      Deep Tendon Reflexes: Reflexes are normal and symmetric.   Psychiatric:         Behavior: Behavior is cooperative.         Assessment/Plan   Problem List Items Addressed This Visit             ICD-10-CM    Hyperlipidemia E78.5    Hypertension I10    Type 2 diabetes mellitus with morbid obesity (CMS/[facility]) - Primary E11.69, E66.01   Dm2:   Numbers are great.  Reduce meal insulin to 10 units twice daily (on discussion actually taking approx 15 with meals)  Cmp and A1c today  Htn:  Bp " excellent  Lipids:  Controlled on atorva  Follow up in 4 months

## 2023-11-06 NOTE — PATIENT INSTRUCTIONS
Reduce meal insulin to 10 units twice daily  Call or message with concerns  Keep up the good work with weight

## 2023-11-11 DIAGNOSIS — H26.9 UNSPECIFIED CATARACT: ICD-10-CM

## 2023-11-11 RX ORDER — SEMAGLUTIDE 1.34 MG/ML
1 INJECTION, SOLUTION SUBCUTANEOUS
Qty: 9 ML | Refills: 3 | Status: SHIPPED | OUTPATIENT
Start: 2023-11-11 | End: 2024-02-05 | Stop reason: WASHOUT

## 2023-11-20 ENCOUNTER — OFFICE VISIT (OUTPATIENT)
Dept: UROLOGY | Facility: HOSPITAL | Age: 63
End: 2023-11-20
Payer: COMMERCIAL

## 2023-11-20 DIAGNOSIS — R97.20 ELEVATED PSA: ICD-10-CM

## 2023-11-20 DIAGNOSIS — C61 MALIGNANT NEOPLASM OF PROSTATE (MULTI): Primary | ICD-10-CM

## 2023-11-20 DIAGNOSIS — N52.9 ERECTILE DYSFUNCTION, UNSPECIFIED ERECTILE DYSFUNCTION TYPE: ICD-10-CM

## 2023-11-20 DIAGNOSIS — C61 PROSTATE CANCER (MULTI): ICD-10-CM

## 2023-11-20 PROCEDURE — 3044F HG A1C LEVEL LT 7.0%: CPT | Performed by: STUDENT IN AN ORGANIZED HEALTH CARE EDUCATION/TRAINING PROGRAM

## 2023-11-20 PROCEDURE — 1036F TOBACCO NON-USER: CPT | Performed by: STUDENT IN AN ORGANIZED HEALTH CARE EDUCATION/TRAINING PROGRAM

## 2023-11-20 PROCEDURE — 99214 OFFICE O/P EST MOD 30 MIN: CPT | Performed by: STUDENT IN AN ORGANIZED HEALTH CARE EDUCATION/TRAINING PROGRAM

## 2023-11-20 PROCEDURE — 4010F ACE/ARB THERAPY RXD/TAKEN: CPT | Performed by: STUDENT IN AN ORGANIZED HEALTH CARE EDUCATION/TRAINING PROGRAM

## 2023-11-20 RX ORDER — TADALAFIL 5 MG/1
5 TABLET ORAL DAILY
Qty: 30 TABLET | Refills: 3 | Status: ON HOLD | OUTPATIENT
Start: 2023-11-20 | End: 2024-01-08

## 2023-11-20 NOTE — PROGRESS NOTES
UROLOGIC FOLLOW-UP VISIT     PROBLEM LIST:  1. Erectile dysfunction, unspecified erectile dysfunction type  tadalafil (Cialis) 5 mg tablet      2. Elevated PSA        3. Prostate cancer (CMS/HCC)  NM PET CT prostate PSMA           HISTORY OF PRESENT ILLNESS:   62 y/o male referred from my colleague Dr. Delgado for evalaution of elevated PSA and abdnormal pMRI. Patient has a history of BPH and LUTS. He had a PSA level of 4.17 and underwent a pMRI which revealed both a PIRADS 3 lesion and PIRADS 4 lesion. Patient underwent MR guided prostate biopsy on 10/13/2023 and presents today for pathology results.   Patient denies any fh of prostate cancer. He denies any hematuria or dysuria. Denies any f/c/n/v.        PAST MEDICAL HISTORY:  Past Medical History:   Diagnosis Date    Achilles tendinitis 06/16/2023    Anxiety 06/16/2023    Diabetes mellitus (CMS/HCC) 06/16/2023    GERD (gastroesophageal reflux disease)     Hyperlipidemia     Hypertension        PAST SURGICAL HISTORY:  Past Surgical History:   Procedure Laterality Date    APPENDECTOMY  01/22/2016    Appendectomy    COLONOSCOPY  10/02/2013    Complete Colonoscopy    OTHER SURGICAL HISTORY Left 10/30/2020    Cataract surgery        ALLERGIES:   No Known Allergies     MEDICATIONS:     Current Outpatient Medications:     semaglutide (Ozempic) 1 mg/dose (4 mg/3 mL) pen injector, INJECT 1 MG SUBCUTANEOUSLY WEEKLY, Disp: 9 mL, Rfl: 3    aspirin 81 mg EC tablet, Take 1 tablet (81 mg) by mouth once daily., Disp: , Rfl:     atorvastatin (Lipitor) 10 mg tablet, Take 1 tablet (10 mg) by mouth once daily., Disp: 90 tablet, Rfl: 1    empagliflozin (Jardiance) 10 mg, Take 1 tablet (10 mg) by mouth once daily., Disp: , Rfl:     FreeStyle Reg 2 Sensor kit, USE FOR 14 DAYS AND REPLACE., Disp: , Rfl:     insulin aspart (NovoLOG FlexPen U-100 Insulin) 100 unit/mL (3 mL) pen, Inject under the skin., Disp: , Rfl:     insulin glargine (Basaglar KwikPen U-100 Insulin) 100 unit/mL (3  mL) pen, Inject under the skin once daily., Disp: , Rfl:     lisinopril 20 mg tablet, Take 1 tablet (20 mg) by mouth once daily., Disp: , Rfl:     metFORMIN (Glucophage) 1,000 mg tablet, TAKE 1 TABLET TWICE A DAY, Disp: 180 tablet, Rfl: 3    semaglutide (Ozempic) 2 mg/dose (8 mg/3 mL) pen injector, Inject under the skin., Disp: , Rfl:     sildenafil (Viagra) 50 mg tablet, Take by mouth., Disp: , Rfl:     tamsulosin (Flomax) 0.4 mg 24 hr capsule, Take 2 capsules (0.8 mg) by mouth once daily., Disp: 180 capsule, Rfl: 3      SOCIAL HISTORY:  Patient  reports that he has never smoked. He has never used smokeless tobacco. He reports that he does not currently use alcohol. He reports that he does not use drugs.   Social History     Socioeconomic History    Marital status:      Spouse name: Not on file    Number of children: Not on file    Years of education: Not on file    Highest education level: Not on file   Occupational History    Not on file   Tobacco Use    Smoking status: Never    Smokeless tobacco: Never   Substance and Sexual Activity    Alcohol use: Not Currently    Drug use: Never    Sexual activity: Not on file   Other Topics Concern    Not on file   Social History Narrative    Not on file     Social Determinants of Health     Financial Resource Strain: Not on file   Food Insecurity: Not on file   Transportation Needs: Not on file   Physical Activity: Not on file   Stress: Not on file   Social Connections: Not on file   Intimate Partner Violence: Not on file   Housing Stability: Not on file       FAMILY HISTORY:  Family History   Problem Relation Name Age of Onset    Diabetes Father      Cancer Maternal Grandfather         REVIEW OF SYSTEMS:  Constitutional: Negative for fever and chills. Denies anorexia, weight loss.  Eyes: Negative for visual disturbance.   Respiratory: Negative for shortness of breath.    Cardiovascular: Negative for chest pain.   Gastrointestinal: Negative for nausea and vomiting.    Genitourinary: See interval history above.  Skin: Negative for rash.   Neurological: Negative for dizziness and numbness.   Psychiatric/Behavioral: Negative for confusion and decreased concentration.     PHYSICAL EXAM:  There were no vitals taken for this visit.  Constitutional: Patient appears well-developed and well-nourished. No distress.    Head: Normocephalic and atraumatic.    Neck: Normal range of motion.    Cardiovascular: Normal rate.    Pulmonary/Chest: Effort normal. No respiratory distress.   Abdominal: soft NTND  Musculoskeletal: Normal range of motion.    Neurological: Alert and oriented to person, place, and time.  Psychiatric: Normal mood and affect. Behavior is normal. Thought content normal.      PATHOLOGY REVIEW:  A.  Prostate, right posterior apex, biopsy:  --BENIGN PROSTATIC TISSUE.     B.  Prostate, right posterior base, biopsy:  --BENIGN PROSTATIC TISSUE.     C.  Prostate, right paramedian apex, biopsy:  --BENIGN PROSTATIC TISSUE.     D.  Prostate, right paramedian base, biopsy:  --BENIGN PROSTATIC TISSUE.     E. Prostate, right lateral, biopsy:  --BENIGN PROSTATIC TISSUE.     F.  Prostate, right anterior, biopsy:  --BENIGN PROSTATIC TISSUE.     G.  Prostate, left paramedian apex, biopsy:  --BENIGN PROSTATIC TISSUE.     H.  Prostate, left paramedian base, biopsy:  --BENIGN PROSTATIC TISSUE.     I.  Prostate, left anterior, biopsy:  --BENIGN PROSTATIC TISSUE.     J.  Prostate, left posterior base, biopsy:  --BENIGN PROSTATIC TISSUE.     K.  Prostate, left posterior apex, biopsy:  --BENIGN PROSTATIC TISSUE.     L.  Prostate, left lateral, biopsy:  --BENIGN PROSTATIC TISSUE.     M.  Prostate, 1/1, biopsy:  --BENIGN PROSTATIC TISSUE.     N.  Prostate, 1/2, biopsy:  --BENIGN PROSTATIC TISSUE.     O.  Prostate, 1/3, biopsy:  --BENIGN PROSTATIC TISSUE.     P.  Prostate, 1/4, biopsy:  --BENIGN PROSTATIC TISSUE.     Q.  Prostate, 2/1, biopsy:  -- PROSTATIC ADENOCARCINOMA, ACINAR TYPE, MARIAA SCORE 4  + 4 = 8, GRADE GROUP 4, INVOLVING ONE OF ONE FRAGMENT AND APPROXIMATELY 50% OF THE TISSUE SUBMITTED.  -- INTRADUCTAL CARCINOMA IDENTIFIED.      R.  Prostate, 2/2, biopsy:  -- PROSTATIC ADENOCARCINOMA, MIXED ACINAR TYPE AND DUCTAL TYPE, MARIAA SCORE 4 + 4 = 8, GRADE GROUP 4, INVOLVING ONE OF ONE FRAGMENT AND APPROXIMATELY 50% OF THE TISSUE SUBMITTED.     S.  Prostate, 2/3, biopsy:  -- ATYPICAL SMALL ACINAR PROLIFERATION.     T.  Prostate, 2/4, biopsy:  --BENIGN PROSTATIC TISSUE.     U prostate, 3/1, biopsy:  -- ATYPICAL INTRADUCTAL PROLIFERATION.      V. Prostate, 3/2, biopsy:  --BENIGN PROSTATIC TISSUE.     W.  Prostate, 3/3, biopsy:  -- FOCI OF FRAGMENTED PORTIONS OF ATYPICAL ACINI, SUSPICIOUS FOR ADENOCARCINOMA.      X.  Prostate, 3/4, biopsy:  --BENIGN PROSTATIC TISSUE.    Lab Results   Component Value Date    BUN 15 11/06/2023    CREATININE 0.99 11/06/2023    EGFR 87 11/06/2023     11/06/2023    K 4.7 11/06/2023     11/06/2023    CO2 25 11/06/2023    CALCIUM 9.6 11/06/2023      Lab Results   Component Value Date    WBC 7.9 07/21/2023    RBC 5.57 07/21/2023    HGB 14.5 07/21/2023    HCT 48.9 07/21/2023    MCV 88 07/21/2023    MCHC 29.7 (L) 07/21/2023    RDW 14.6 (H) 07/21/2023     07/21/2023        Lab Results   Component Value Date    PSA 4.17 (H) 06/01/2023    PSA 3.3 05/10/2022    PSA 1.95 05/18/2021    PSA 1.8 01/06/2021    PSA 5.17 (H) 11/09/2020    PSA 2.09 05/23/2020    PSA 1.74 11/02/2019    PSA 1.97 03/09/2019           Assessment:      1. Erectile dysfunction, unspecified erectile dysfunction type  tadalafil (Cialis) 5 mg tablet      2. Elevated PSA        3. Prostate cancer (CMS/HCC)  NM PET CT prostate PSMA             Plan:    Reviewed and interpreted patient's pathology report with him and his family    Interpreted how we risk stratify patients and his risk category   We discussed the options for definitive treatment and patient was adamant about not wanting radiation   Will  obtain a PSMA scan and proceed with RALP based on the findings   Will start on 5mg of cialis to aid with erectile recovery after surgery    30 minutes total spent on patient's care today; >50% time spent on counseling/coordination of care

## 2023-11-21 PROBLEM — C61 PROSTATE CANCER (MULTI): Status: ACTIVE | Noted: 2023-11-20

## 2023-11-21 RX ORDER — CHLORHEXIDINE GLUCONATE 40 MG/ML
SOLUTION TOPICAL DAILY PRN
Status: CANCELLED | OUTPATIENT
Start: 2023-11-21

## 2023-11-21 RX ORDER — SODIUM CHLORIDE 9 MG/ML
100 INJECTION, SOLUTION INTRAVENOUS CONTINUOUS
Status: CANCELLED | OUTPATIENT
Start: 2023-11-21

## 2023-11-27 ENCOUNTER — HOSPITAL ENCOUNTER (OUTPATIENT)
Dept: RADIOLOGY | Facility: HOSPITAL | Age: 63
Discharge: HOME | End: 2023-11-27
Payer: COMMERCIAL

## 2023-11-27 DIAGNOSIS — C61 PROSTATE CANCER (MULTI): ICD-10-CM

## 2023-12-12 ENCOUNTER — HOSPITAL ENCOUNTER (OUTPATIENT)
Dept: RADIOLOGY | Facility: HOSPITAL | Age: 63
Discharge: HOME | End: 2023-12-12
Payer: COMMERCIAL

## 2023-12-12 PROCEDURE — 78815 PET IMAGE W/CT SKULL-THIGH: CPT

## 2023-12-12 PROCEDURE — 78815 PET IMAGE W/CT SKULL-THIGH: CPT | Performed by: NUCLEAR MEDICINE

## 2023-12-12 PROCEDURE — A9800 HC RX 343 DIAGNOSTIC RADIOPHARMACEUTICALS: HCPCS | Performed by: STUDENT IN AN ORGANIZED HEALTH CARE EDUCATION/TRAINING PROGRAM

## 2023-12-12 PROCEDURE — 3430000001 HC RX 343 DIAGNOSTIC RADIOPHARMACEUTICALS: Performed by: STUDENT IN AN ORGANIZED HEALTH CARE EDUCATION/TRAINING PROGRAM

## 2023-12-12 RX ADMIN — KIT FOR THE PREPARATION OF GALLIUM GA 68 GOZETOTIDE 5.9 MILLICURIE: 25 INJECTION, POWDER, LYOPHILIZED, FOR SOLUTION INTRAVENOUS at 09:11

## 2023-12-13 PROBLEM — R93.1 ELEVATED CORONARY ARTERY CALCIUM SCORE: Status: ACTIVE | Noted: 2023-12-13

## 2023-12-13 PROBLEM — I25.10 CORONARY ARTERY CALCIFICATION: Status: ACTIVE | Noted: 2023-12-13

## 2023-12-13 PROBLEM — I25.84 CORONARY ARTERY CALCIFICATION: Status: ACTIVE | Noted: 2023-12-13

## 2023-12-14 ENCOUNTER — HOSPITAL ENCOUNTER (OUTPATIENT)
Dept: CARDIOLOGY | Facility: HOSPITAL | Age: 63
Discharge: HOME | End: 2023-12-14
Payer: COMMERCIAL

## 2023-12-14 DIAGNOSIS — C61 PROSTATE CANCER (MULTI): ICD-10-CM

## 2023-12-14 PROCEDURE — 93005 ELECTROCARDIOGRAM TRACING: CPT

## 2023-12-14 PROCEDURE — 93010 ELECTROCARDIOGRAM REPORT: CPT | Performed by: INTERNAL MEDICINE

## 2023-12-15 ENCOUNTER — OFFICE VISIT (OUTPATIENT)
Dept: SLEEP MEDICINE | Facility: CLINIC | Age: 63
End: 2023-12-15
Payer: COMMERCIAL

## 2023-12-15 ENCOUNTER — TELEPHONE (OUTPATIENT)
Dept: PREADMISSION TESTING | Facility: HOSPITAL | Age: 63
End: 2023-12-15

## 2023-12-15 VITALS
DIASTOLIC BLOOD PRESSURE: 66 MMHG | OXYGEN SATURATION: 98 % | HEIGHT: 64 IN | HEART RATE: 89 BPM | BODY MASS INDEX: 29.62 KG/M2 | SYSTOLIC BLOOD PRESSURE: 108 MMHG | WEIGHT: 173.5 LBS

## 2023-12-15 DIAGNOSIS — G47.33 OSA (OBSTRUCTIVE SLEEP APNEA): Primary | ICD-10-CM

## 2023-12-15 PROBLEM — G47.30 SLEEP APNEA: Status: RESOLVED | Noted: 2023-06-16 | Resolved: 2023-12-15

## 2023-12-15 PROCEDURE — 99214 OFFICE O/P EST MOD 30 MIN: CPT | Performed by: NURSE PRACTITIONER

## 2023-12-15 PROCEDURE — 3074F SYST BP LT 130 MM HG: CPT | Performed by: NURSE PRACTITIONER

## 2023-12-15 PROCEDURE — 4010F ACE/ARB THERAPY RXD/TAKEN: CPT | Performed by: NURSE PRACTITIONER

## 2023-12-15 PROCEDURE — 3044F HG A1C LEVEL LT 7.0%: CPT | Performed by: NURSE PRACTITIONER

## 2023-12-15 PROCEDURE — 1036F TOBACCO NON-USER: CPT | Performed by: NURSE PRACTITIONER

## 2023-12-15 PROCEDURE — 3078F DIAST BP <80 MM HG: CPT | Performed by: NURSE PRACTITIONER

## 2023-12-15 ASSESSMENT — COLUMBIA-SUICIDE SEVERITY RATING SCALE - C-SSRS
1. IN THE PAST MONTH, HAVE YOU WISHED YOU WERE DEAD OR WISHED YOU COULD GO TO SLEEP AND NOT WAKE UP?: NO
6. HAVE YOU EVER DONE ANYTHING, STARTED TO DO ANYTHING, OR PREPARED TO DO ANYTHING TO END YOUR LIFE?: NO
2. HAVE YOU ACTUALLY HAD ANY THOUGHTS OF KILLING YOURSELF?: NO

## 2023-12-15 ASSESSMENT — SLEEP AND FATIGUE QUESTIONNAIRES
HOW LIKELY ARE YOU TO NOD OFF OR FALL ASLEEP WHILE SITTING AND TALKING TO SOMEONE: MODERATE CHANCE OF DOZING
HOW LIKELY ARE YOU TO NOD OFF OR FALL ASLEEP WHILE SITTING QUIETLY AFTER LUNCH WITHOUT ALCOHOL: SLIGHT CHANCE OF DOZING
ESS-CHAD TOTAL SCORE: 20
HOW LIKELY ARE YOU TO NOD OFF OR FALL ASLEEP IN A CAR, WHILE STOPPED FOR A FEW MINUTES IN TRAFFIC: MODERATE CHANCE OF DOZING
SATISFACTION_WITH_CURRENT_SLEEP_PATTERN: VERY DISSATISFIED
SITING INACTIVE IN A PUBLIC PLACE LIKE A CLASS ROOM OR A MOVIE THEATER: HIGH CHANCE OF DOZING
WORRIED_DISTRESSED_DUE_TO_SLEEP: VERY MUCH NOTICEABLE
HOW LIKELY ARE YOU TO NOD OFF OR FALL ASLEEP WHILE SITTING AND READING: HIGH CHANCE OF DOZING
HOW LIKELY ARE YOU TO NOD OFF OR FALL ASLEEP WHILE WATCHING TV: HIGH CHANCE OF DOZING
SLEEP_PROBLEM_NOTICEABLE_TO_OTHERS: VERY MUCH NOTICEABLE
SLEEP_PROBLEM_INTERFERES_DAILY_ACTIVITIES: VERY MUCH NOTICEABLE
HOW LIKELY ARE YOU TO NOD OFF OR FALL ASLEEP WHEN YOU ARE A PASSENGER IN A CAR FOR AN HOUR WITHOUT A BREAK: HIGH CHANCE OF DOZING
DIFFICULTY_STAYING_ASLEEP: VERY SEVERE
HOW LIKELY ARE YOU TO NOD OFF OR FALL ASLEEP WHILE LYING DOWN TO REST IN THE AFTERNOON WHEN CIRCUMSTANCES PERMIT: HIGH CHANCE OF DOZING
WAKING_TOO_EARLY: VERY SEVERE

## 2023-12-15 ASSESSMENT — ENCOUNTER SYMPTOMS
LOSS OF SENSATION IN FEET: 0
DEPRESSION: 0
OCCASIONAL FEELINGS OF UNSTEADINESS: 0

## 2023-12-15 ASSESSMENT — PATIENT HEALTH QUESTIONNAIRE - PHQ9
1. LITTLE INTEREST OR PLEASURE IN DOING THINGS: NOT AT ALL
2. FEELING DOWN, DEPRESSED OR HOPELESS: NOT AT ALL
SUM OF ALL RESPONSES TO PHQ9 QUESTIONS 1 AND 2: 0

## 2023-12-15 NOTE — PROGRESS NOTES
Patient: Juan Porter    [id number]  : 1960 -- AGE 63 y.o.    Provider: JOSÉ Saha     Location [facility]   Service Date: 12/15/2023              [facility]  Followup Visit Note    HISTORY OF PRESENT ILLNESS       HISTORY OF PRESENT ILLNESS   Juan Porter is a 63 y.o. male with past medical history of anxiety, obesity, cataract, CAD, DM2, ED, BPH, hyperlipidemia, HTN, obesity, LEO who presents to a [facility] for followup. JUAN is a . He works second shift.    PAST SLEEP HISTORY    JUAN has had a sleep study completed in 2015 showing no significant sleep apnea with an AHI of 1.3. Arousal index of 11.1. The SpO2 rosie of 80%. The PLM index was 0. The supine REM AHI was 64.4. Recommendation was for PAP titration.   PAP titration was completed on 2016. CPAP was titrated from 5 to 10. Recommendation was for auto PAP 8-20.    CURRENT SLEEP HISTORY    On today's visit, the patient reports just finding PAP machine yesterday in storage. He has not registered with [employer] yet. He notes his wife has said he is no longer snoring. Has lost weight since last testing. Asking if he needs to continue on PAP. Has prostate procedure scheduled for , hoping he will wake up less for bathroom once he is healed from surgery.     RLS Followup:  denies    Insomnia follow up:  Bedtime: 1 AM  Sleep Latency: right away  Awakenings: 2-3x per night bathroom  Wake time: 5 AM  Naps:   none    ESS: 20   MAKENZIE: 24  FOSQ: 36    REVIEW OF SYSTEMS     REVIEW OF SYSTEMS  Review of Systems   All other systems reviewed and are negative.      ALLERGIES AND MEDICATIONS     ALLERGIES  No Known Allergies    MEDICATIONS  Current Outpatient Medications   Medication Sig Dispense Refill    aspirin 81 mg EC tablet Take 1 tablet (81 mg) by mouth once daily.      atorvastatin (Lipitor) 10 mg tablet Take 1 tablet (10 mg) by  mouth once daily. 90 tablet 1    empagliflozin (Jardiance) 10 mg Take 1 tablet (10 mg) by mouth once daily.      FreeStyle Reg 2 Sensor kit USE FOR 14 DAYS AND REPLACE.      insulin aspart (NovoLOG FlexPen U-100 Insulin) 100 unit/mL (3 mL) pen Inject under the skin.      insulin glargine (Basaglar KwikPen U-100 Insulin) 100 unit/mL (3 mL) pen Inject under the skin once daily.      lisinopril 20 mg tablet Take 1 tablet (20 mg) by mouth once daily.      metFORMIN (Glucophage) 1,000 mg tablet TAKE 1 TABLET TWICE A  tablet 3    semaglutide (Ozempic) 1 mg/dose (4 mg/3 mL) pen injector INJECT 1 MG SUBCUTANEOUSLY WEEKLY 9 mL 3    semaglutide (Ozempic) 2 mg/dose (8 mg/3 mL) pen injector Inject under the skin.      tadalafil (Cialis) 5 mg tablet Take 1 tablet (5 mg) by mouth once daily. 30 tablet 3    tamsulosin (Flomax) 0.4 mg 24 hr capsule Take 2 capsules (0.8 mg) by mouth once daily. 180 capsule 3    sildenafil (Viagra) 50 mg tablet Take by mouth.       No current facility-administered medications for this visit.       PPAST MEDICAL HISTORY  Past Medical History:   Diagnosis Date    Achilles tendinitis 06/16/2023    Anxiety 06/16/2023    Diabetes mellitus (CMS/[facility]) 06/16/2023    GERD (gastroesophageal reflux disease)     Hyperlipidemia     Hypertension        PAST SURGICAL HISTORY:  Past Surgical History:   Procedure Laterality Date    APPENDECTOMY  01/22/2016    Appendectomy    COLONOSCOPY  10/02/2013    Complete Colonoscopy    OTHER SURGICAL HISTORY Left 10/30/2020    Cataract surgery       FAMILY HISTORY  Family History   Problem Relation Name Age of Onset    Diabetes Father      Cancer Maternal Grandfather         FAMILY HISTORY: No changes since previous visit. Otherwise non-contributory as charted.       SOCIAL HISTORY  He  reports that he has never smoked. He has never used smokeless tobacco. He reports that he does not currently use alcohol. He reports that he does not use drugs.       PHYSICAL EXAM  "    VITAL SIGNS: /66 (BP Location: Left arm, Patient Position: Sitting, BP Cuff Size: Large adult)   Pulse 89   Ht 1.626 m (5' 4\")   Wt 78.7 kg (173 lb 8 oz)   SpO2 98%   BMI 29.78 kg/m²      PREVIOUS WEIGHTS:  Wt Readings from Last 3 Encounters:   12/15/23 78.7 kg (173 lb 8 oz)   11/06/23 79.8 kg (176 lb)   10/13/23 80.2 kg (176 lb 12.9 oz)       Physical Exam  Physical Exam   Constitutional: Alert and oriented, cooperative, no obvious distress   HEENT: Non icteric or anemic, EOM WNL bilaterally   Neck: Supple, no JVD, no goiter, no adenopathy, no rigidity  Chest: CTA bilaterally, no wheezing, crackles, rubs   Cardiac: RRR, S1 and S2, no murmur, rub, thrill   Abdomen: Obese, Soft, nontender, no masses, no organomegaly   Extremities: No clubbing, no LL edema   Neuromuscular: Cranial nerves grossly intact, no focal deficits      RESULTS/DATA     Bicarbonate (mmol/L)   Date Value   11/06/2023 25   07/21/2023 25   04/18/2023 29   01/19/2023 28       PAP Adherence:  DURABLE MEDICAL EQUIPMENT COMPANY: [facility]  Machine: THERAPY: [employer]     Auto 8-20 flex 2  Set up in Feb 2016  No usage in ~3 years    ASSESSMENT/PLAN     Mr. Porter is a 63 y.o. male and He returns in followup to the [facility] for LEO.    Problem List and Orders  Problem List Items Addressed This Visit             ICD-10-CM    LEO (obstructive sleep apnea) - Primary G47.33     Remote diagnosis  Will repeat testing with [facility]- ok to schedule around surgery date. Discussed that he should wait until healed if after Jan 5th date, he verbalized understanding  Mikaela information provided again today, should he need to continue PAP  Will see him in 6 mo if PAP needs to be resumed. He is agreeable          Relevant Orders    Home sleep apnea test ([facility])     Disposition    Return to clinic in 6 months       "

## 2023-12-15 NOTE — ASSESSMENT & PLAN NOTE
Remote diagnosis  Will repeat testing with [facility]- ok to schedule around surgery date. Discussed that he should wait until healed if after Jan 5th date, he verbalized understanding  Mikaela information provided again today, should he need to continue PAP  Will see him in 6 mo if PAP needs to be resumed. He is agreeable   
14-Aug-2023

## 2023-12-15 NOTE — PATIENT INSTRUCTIONS
I will be off on leave of absence January through April. Please schedule follow up with one of my colleagues if need to be seen during this time frame.    Dr. Lucas Romero, [place] and Julianne Barbosa or Analia [place] and [facility]  Dr. Skyler Romero and Dilma Winslow PA-C, Paula Romero, Lulu Love. NP, [place] [facility]      EFFECTIVE IMMEDIATELY:  If you have a Mikaela Respirpickrsets PAP device, discontinue use if your device was manufactured prior to April 26, 2021 due to recall issued on June 14, 2021 from [employer] due to concern for potential risk for health issues related to foam used in specific Mikaela PAP devices.    For more information, contact your DME for next steps and to verify the age of your device. If your device is more than 5 years old, you may be eligible for an updated device per insurance. You can also visit [facility]/SRC-update or call [phone].    I identified that my patient is using a device under the Mikaela recall and advised them to register their device <<https://www.philipssrcupdate.[employer].UBEnX.com/>> and call [employer] for additional information if needed (Mikaela number, [phone]). I advised that the company has not given a clear timeline for when patients will receive their machine.  Thus, I offered a prescription for a replacement machine but also recognize that insurance and resources may limit the ability to obtain a new device at this time.  The risk and benefits of continuations of using the current PAP therapy as well as treatment options were discussed with the patient, taking into consideration comorbidities, severity of symptoms, risks associated with PAP discontinuation, and safety-sensitive roles. The risk of the potential particulate exposure symptoms listed by the company were discussed, which includes respiratory issues, and possible toxic and carcinogenic effects. [employer]  "reports that the foam-related complaint rate in 2020 was low (0.03%)  and that \"The potential risks of chemical exposure due to off-gassing include headache, irritation, hypersensitivity, nausea/vomiting, and possible toxic and carcinogenic effects.\" Mikaela reports that it \"has received no reports regarding patient impact related to chemical emissions\"             [facility] Sleep Medicine  [facility] [address]  [address] [address]       NAME: Ko Porter   DATE:  12/15/23    DIAGNOSIS:   1. LEO (obstructive sleep apnea)  Home sleep apnea test (HSAT)          Thank you for coming to the Sleep Medicine Clinic today! Your sleep medicine provider today was: EFRA Saha-CNP Below is a summary of your treatment plan, other important information, and our contact numbers:    TREATMENT PLAN:   - Get your sleep study scheduled  - Follow-up in 2-3 months.  - If not already done, sign up for 'My Chart' and send prescription requests or messages through this      Scheduling a Sleep Study    Call the  Sleep Testing Center to speak with a sleep testing  to book your overnight sleep study procedure at one of our adult and pediatric-friendly sleep labs. Overnight sleep studies may be scheduled on a weekday or weekend.    We have child life services on a case by case basis at the [facility] location. We also perform daytime testing for shift workers on a case by case basis.    Locations for sleep studies are: [facility], [facility] ([facility]), [facility], [facility] ([place], [place], [place].    Bring your usual medications and nightly routine items for your sleep study. In order to fall asleep faster in sleep lab, we advise patients to wake up earlier on the morning of the scheduled testing and avoid napping prior to testing. Sometimes, your provider may prescribe a sleep aid to be taken at lights out " in the sleep lab. If you are taking a sleep aid, please have somebody pick you up after the sleep testing.    Results of your sleep study will be given to the ordering clinician. Please contact their office for results or follow up as directed by your clinician.    For additional information about the sleep medicine services, please call [phone]       Instructions - Common LEO Recs: - For your sleep apnea, continue to use your PAP every night and use it whenever you are sleeping.   - Avoid alcohol or sedatives several hours prior to sleeping.   - Get additional supplies for your PAP (e.g., mask, hose, filters) every 3 months or as your insurance allows from your [employer] company. Replacement cushions for your PAP mask can be requested monthly if airseals are an issue.  - Remember to clean your mask, tubings, and water chamber regularly as instructed.  - Avoid driving or operating heavy machinery when drowsy. A person driving while sleepy is five (5) times more likely to have an accident. If you feel sleepy, pull over and take a short power nap (sleep for less than 30 minutes). Otherwise, ask somebody to drive you.    EASY WAYS TO IMPROVE YOUR SLEEP:  1. Go to bed and wake up at the same time every day.   Aim for 8 hours but some people need less, some need more.   Get out of bed if you are not sleeping.   Limit naps to 20 min or less.   2. Expose yourself to daylight and/ or bright light in the morning.   Go outside or spend time near a window each morning.   You can use a light box (found on Amazon) if you wake before the sunrise.   Limit light exposure in the evenings (including electronic usage).   Try meditation, reading, stretching, deep breathing, warm shower or bath, or yoga nidra as part of your bedtime routine. There are many great FREE, videos or audio tracks on YouTube/ [employer]have[employer].[email or web address], etc for guidance.  3. Exercise, in some form, EVERY day, but not too close to bedtime. Consider making this part of your routine  at the start of your day, followed by a cool shower.  4. Eat meals at roughly the same time every day. Make sure you are prioritizing fruits, vegetables, whole grains, lean proteins.  5. Time your caffeine intake. Make sure you are not drinking caffeine within 8 to 12 hours prior to your bedtime.   6. Avoid marijuana, alcohol, and nicotine. They will reduce sleep quality in any quantity.  7. Learn to manage anxiety. Psychology services at  can be reached at [phone] to schedule an appointment.     IMPORTANT INFORMATION:     Call 911 for medical emergencies.  Our offices are generally open from Monday-Friday, 9 am - 5 pm.  If you need to get in touch with me, you may either call me and my team(number is below) or you can use [address].  If a referral for a test, for CPAP, or for another specialist was made, and you have not heard about scheduling this within a week, please call scheduling at [phone] ([phone]).  If you are unable to make your appointment for clinic or an overnight study, kindly call the office at least 48 hours in advance to cancel and reschedule.  If you are on CPAP, please bring your device's card to each clinic appointment unless told otherwise by your provider.  There are no supporting services by either the sleep doctors or their staff on weekends and Holidays, or after 5 PM on weekdays.   If you have been asked to come to a sleep study, make sure you bring toiletries, a comfy pillow, and any nighttime medications that you may regularly take. Also be sure to eat dinner before you arrive. We generally do not provide meals.      PRESCRIPTIONS:  We require 7 days advanced notice for prescription refills. If we do not receive the request in this time, we cannot guarantee that your medication will be refilled in time. Please contact the sleep nurses listed below for refills or request via [address].     IMPORTANT PHONE NUMBERS:   Sleep Medicine Clinic Fax: [phone]  Appointments (for Pediatric  Sleep Clinic): [phone] - option 1  Appointments (for Adult Sleep Clinic): [phone] - option 2  Appointments (For Sleep Studies): [phone] - option 3  Behavioral Sleep Medicine: [phone]  Sleep Surgery: [phone]  ENT (Otolaryngology): [phone]  Headache Clinic (Neurology): [phone]  Neurology: [phone]  Psychiatry: [phone]  Pulmonary Function Testing (PFT) Center: [phone]  Pulmonary Medicine: [phone]  "[employer] (DME): [phone]  [employer] (DME): [phone]  [facility] (DME): [phone]    Our Adult Sleep Medicine Team (Please do not hesitate to call the office or sleep nurse with any questions between appointments):    Adult Sleep Nurses (Jaki Herrera, RN and Carmen Molina RN):  For clinical questions and refilling prescriptions: [phone]  Email sleep diaries and other documents at: [email or web address]    Adult Sleep Medicine Secretaries:  Sofi Parson (For Gillian/Bueno/Krise/Strohl/Yeh/Huffman):   P: [phone]  F: [phone]  Genoveva Helm (For [place]/Guggenbiller): P: [phone]  Fax: [phone]  Renita Marie (For Jurcevic/Blank): P: [phone]  F: [phone]  Marisel Li (For [place]): P: [phone]  F: [phone]  Heidi Coley (For Marly/Ivan/Zakhcourtney): P: [phone]  F: [phone]  Freda Woody (For Barbosa/Perez): P: [phone]  F: [phone]     Adult Sleep Medicine Advanced Practice Providers:  Jai Gonzales ([place], [place])  Sandra Love ([facility])  Gloria Pierson CNP ([place], [place], [facility])  Mary Winslow CNP ([place], [place], [facility])  Fatemeh Chase ([facility], [facility])  Analia Perez CNP ([facility])      Our Sleep Testing Center (STC) Locations:  Our team will contact you to schedule your sleep study, however, you can contact us as follow:  Main Phone Line (scheduling only): [phone]  [phone]), option 3  Adult and Pediatric Locations   [place] (6 years and older): [facility] - 4th floor ([address], [facility]) After hours line: [phone]  [facility] at [facility] (Main campus: All ages): [facility], 6th floor. After hours line: [phone]   [place] (5 years and older; younger considered on case-by-case basis): [address]; [address], [address]. Scheduling  After hours line: [phone]   [place] (6 years and older): [address]; [address]; [address]   [place] (6 years and older): [address]  After hours line: [phone]   [place] (13 years and older) in [place]: [address], 2nd floor  After hours line: [phone]   [place] (13 year and older): [address]  After hours line: [phone] (Home studies out of [facility])    Adult Only Locations:   [place] (18 years and older): [address] [facility], 2nd floor   [place] (18 years and older): [address]; 4th floor  After hours line: [phone]   [place] (18 years and older) at [place]: [address]  After hours line: [phone]        CONTACTING YOUR SLEEP MEDICINE PROVIDER:  Send a message directly to your provider through \"My Chart\", which is the email service through your  Records Account: [email or web address]   Call [phone] and leave a message. One of the administrative assistants will forward the message to your sleep medicine provider through \"My Chart\" and/or email.     Your sleep medicine provider for this visit was: EFRA Saha-CNP    In the event that you are running more than 15 minutes late to your appointment, I will kindly ask you to reschedule.       "

## 2023-12-16 LAB
ATRIAL RATE: 87 BPM
P AXIS: 60 DEGREES
P OFFSET: 203 MS
P ONSET: 147 MS
PR INTERVAL: 144 MS
Q ONSET: 219 MS
QRS COUNT: 15 BEATS
QRS DURATION: 84 MS
QT INTERVAL: 354 MS
QTC CALCULATION(BAZETT): 425 MS
QTC FREDERICIA: 400 MS
R AXIS: 68 DEGREES
T AXIS: 60 DEGREES
T OFFSET: 396 MS
VENTRICULAR RATE: 87 BPM

## 2023-12-26 ENCOUNTER — TELEPHONE (OUTPATIENT)
Dept: PREADMISSION TESTING | Facility: HOSPITAL | Age: 63
End: 2023-12-26
Payer: COMMERCIAL

## 2024-01-02 ENCOUNTER — PRE-ADMISSION TESTING (OUTPATIENT)
Dept: PREADMISSION TESTING | Facility: HOSPITAL | Age: 64
End: 2024-01-02
Payer: COMMERCIAL

## 2024-01-02 ENCOUNTER — LAB (OUTPATIENT)
Dept: LAB | Facility: LAB | Age: 64
End: 2024-01-02
Payer: COMMERCIAL

## 2024-01-02 VITALS
OXYGEN SATURATION: 97 % | WEIGHT: 168.87 LBS | HEART RATE: 89 BPM | RESPIRATION RATE: 18 BRPM | BODY MASS INDEX: 28.14 KG/M2 | TEMPERATURE: 96.8 F | HEIGHT: 65 IN | SYSTOLIC BLOOD PRESSURE: 103 MMHG | DIASTOLIC BLOOD PRESSURE: 66 MMHG

## 2024-01-02 DIAGNOSIS — I10 HYPERTENSION, UNSPECIFIED TYPE: Primary | ICD-10-CM

## 2024-01-02 DIAGNOSIS — I10 HYPERTENSION, UNSPECIFIED TYPE: ICD-10-CM

## 2024-01-02 DIAGNOSIS — Z01.818 PREPROCEDURAL EXAMINATION: ICD-10-CM

## 2024-01-02 LAB
ABO GROUP (TYPE) IN BLOOD: NORMAL
ALBUMIN SERPL BCP-MCNC: 4.1 G/DL (ref 3.4–5)
ALP SERPL-CCNC: 83 U/L (ref 33–136)
ALT SERPL W P-5'-P-CCNC: 10 U/L (ref 10–52)
ANION GAP SERPL CALC-SCNC: 12 MMOL/L (ref 10–20)
ANTIBODY SCREEN: NORMAL
APPEARANCE UR: CLEAR
AST SERPL W P-5'-P-CCNC: 10 U/L (ref 9–39)
BASOPHILS # BLD AUTO: 0.04 X10*3/UL (ref 0–0.1)
BASOPHILS NFR BLD AUTO: 0.5 %
BILIRUB SERPL-MCNC: 0.6 MG/DL (ref 0–1.2)
BILIRUB UR STRIP.AUTO-MCNC: NEGATIVE MG/DL
BUN SERPL-MCNC: 16 MG/DL (ref 6–23)
CALCIUM SERPL-MCNC: 9.4 MG/DL (ref 8.6–10.3)
CHLORIDE SERPL-SCNC: 100 MMOL/L (ref 98–107)
CO2 SERPL-SCNC: 30 MMOL/L (ref 21–32)
COLOR UR: YELLOW
CREAT SERPL-MCNC: 0.94 MG/DL (ref 0.5–1.3)
EOSINOPHIL # BLD AUTO: 0.06 X10*3/UL (ref 0–0.7)
EOSINOPHIL NFR BLD AUTO: 0.8 %
ERYTHROCYTE [DISTWIDTH] IN BLOOD BY AUTOMATED COUNT: 14.1 % (ref 11.5–14.5)
GFR SERPL CREATININE-BSD FRML MDRD: >90 ML/MIN/1.73M*2
GLUCOSE SERPL-MCNC: 152 MG/DL (ref 74–99)
GLUCOSE UR STRIP.AUTO-MCNC: ABNORMAL MG/DL
HCT VFR BLD AUTO: 47.4 % (ref 41–52)
HGB BLD-MCNC: 14 G/DL (ref 13.5–17.5)
IMM GRANULOCYTES # BLD AUTO: 0.02 X10*3/UL (ref 0–0.7)
IMM GRANULOCYTES NFR BLD AUTO: 0.3 % (ref 0–0.9)
KETONES UR STRIP.AUTO-MCNC: ABNORMAL MG/DL
LEUKOCYTE ESTERASE UR QL STRIP.AUTO: NEGATIVE
LYMPHOCYTES # BLD AUTO: 2.33 X10*3/UL (ref 1.2–4.8)
LYMPHOCYTES NFR BLD AUTO: 30.5 %
MCH RBC QN AUTO: 26 PG (ref 26–34)
MCHC RBC AUTO-ENTMCNC: 29.5 G/DL (ref 32–36)
MCV RBC AUTO: 88 FL (ref 80–100)
MONOCYTES # BLD AUTO: 0.58 X10*3/UL (ref 0.1–1)
MONOCYTES NFR BLD AUTO: 7.6 %
NEUTROPHILS # BLD AUTO: 4.61 X10*3/UL (ref 1.2–7.7)
NEUTROPHILS NFR BLD AUTO: 60.3 %
NITRITE UR QL STRIP.AUTO: NEGATIVE
NRBC BLD-RTO: 0 /100 WBCS (ref 0–0)
PH UR STRIP.AUTO: 5 [PH]
PLATELET # BLD AUTO: 264 X10*3/UL (ref 150–450)
POTASSIUM SERPL-SCNC: 4.7 MMOL/L (ref 3.5–5.3)
PROT SERPL-MCNC: 6.8 G/DL (ref 6.4–8.2)
PROT UR STRIP.AUTO-MCNC: NEGATIVE MG/DL
RBC # BLD AUTO: 5.38 X10*6/UL (ref 4.5–5.9)
RBC # UR STRIP.AUTO: ABNORMAL /UL
RBC #/AREA URNS AUTO: NORMAL /HPF
RH FACTOR (ANTIGEN D): NORMAL
SODIUM SERPL-SCNC: 137 MMOL/L (ref 136–145)
SP GR UR STRIP.AUTO: 1.03
UROBILINOGEN UR STRIP.AUTO-MCNC: <2 MG/DL
WBC # BLD AUTO: 7.6 X10*3/UL (ref 4.4–11.3)
WBC #/AREA URNS AUTO: NORMAL /HPF

## 2024-01-02 PROCEDURE — 86850 RBC ANTIBODY SCREEN: CPT

## 2024-01-02 PROCEDURE — 99204 OFFICE O/P NEW MOD 45 MIN: CPT | Performed by: NURSE PRACTITIONER

## 2024-01-02 PROCEDURE — 81001 URINALYSIS AUTO W/SCOPE: CPT

## 2024-01-02 PROCEDURE — 85025 COMPLETE CBC W/AUTO DIFF WBC: CPT

## 2024-01-02 PROCEDURE — 36415 COLL VENOUS BLD VENIPUNCTURE: CPT

## 2024-01-02 PROCEDURE — 80053 COMPREHEN METABOLIC PANEL: CPT

## 2024-01-02 PROCEDURE — 86900 BLOOD TYPING SEROLOGIC ABO: CPT

## 2024-01-02 PROCEDURE — 86901 BLOOD TYPING SEROLOGIC RH(D): CPT

## 2024-01-02 RX ORDER — BISMUTH SUBSALICYLATE 262 MG
1 TABLET,CHEWABLE ORAL DAILY
COMMUNITY
End: 2024-02-05 | Stop reason: WASHOUT

## 2024-01-02 ASSESSMENT — ENCOUNTER SYMPTOMS
GASTROINTESTINAL NEGATIVE: 1
MUSCULOSKELETAL NEGATIVE: 1
CONSTITUTIONAL NEGATIVE: 1
CARDIOVASCULAR NEGATIVE: 1
RESPIRATORY NEGATIVE: 1
NECK NEGATIVE: 1

## 2024-01-02 NOTE — CPM/PAT H&P
CPM/PAT Evaluation       Name: Ko Porter (Ko Porter)  /Age: 1960/63 y.o.     SURGEON :DR TK SONI   Surgery, Date, and Length:  Robot Assisted Prostatectomy , 24  HPI:  This a  63 y.o. male who presents for presurgical evaluation for for above mentioned procedure    . Pt states he had an elevated PSA on routine screen. He hasd an MRI showing suspicious lesions . Biopsies positive for adenocarcinoma After discussion of the risks and benefits with Dr. Soni the patient elects to proceed with the planned procedure.       Past Medical History:   Diagnosis Date    Achilles tendinitis 2023    Anxiety 2023    BPH (benign prostatic hyperplasia)     Diabetes mellitus (CMS/HCC) 2023    A1C= 6.9% on 23    ED (erectile dysfunction)     GERD (gastroesophageal reflux disease)     Hyperlipidemia     Hypertension     LEO (obstructive sleep apnea)     Prostate CA (CMS/HCC)        Past Surgical History:   Procedure Laterality Date    APPENDECTOMY  2016    Appendectomy    COLONOSCOPY  10/02/2013    Complete Colonoscopy    OTHER SURGICAL HISTORY Left 10/30/2020    Cataract surgery     Anesthesia History    SORE THROAT 2/2 LARGE ET TUBE     Pt denies any past history of anesthetic complications such as PONV, awareness, prolonged sedation, dental damage, aspiration, cardiac arrest, difficult intubation, difficult I.V. access or unexpected hospital admissions.  NO malignant hyperthermia and or pseudo cholinesterase deficiency.    The patient is not is not  a Sikhism and will accept blood and blood products if medically indicated.   No history of blood transfusions .Type and screen sent.     Social History  Social History     Substance and Sexual Activity   Drug Use Never      Social History     Substance and Sexual Activity   Alcohol Use Not Currently      Social History     Tobacco Use   Smoking Status Never   Smokeless Tobacco Never          Family History   Problem  Relation Name Age of Onset    Diabetes Father      Cancer Maternal Grandfather         No Known Allergies    Prior to Admission medications    Medication Sig Start Date End Date Taking? Authorizing Provider   aspirin 81 mg EC tablet Take 1 tablet (81 mg) by mouth once daily. 12/10/13   Historical Provider, MD   atorvastatin (Lipitor) 10 mg tablet Take 1 tablet (10 mg) by mouth once daily. 6/26/23   Enmanuel Lui MD   empagliflozin (Jardiance) 10 mg Take 1 tablet (10 mg) by mouth once daily. 4/18/23   Historical Provider, MD   FreeStyle Reg 2 Sensor kit USE FOR 14 DAYS AND REPLACE. 9/21/23   Historical Provider, MD   insulin aspart (NovoLOG FlexPen U-100 Insulin) 100 unit/mL (3 mL) pen Inject under the skin. 1/20/16   Historical Provider, MD   insulin glargine (Basaglar KwikPen U-100 Insulin) 100 unit/mL (3 mL) pen Inject under the skin once daily. 12/10/13   Historical Provider, MD   lisinopril 20 mg tablet Take 1 tablet (20 mg) by mouth once daily. 12/10/13   Historical Provider, MD   metFORMIN (Glucophage) 1,000 mg tablet TAKE 1 TABLET TWICE A DAY 10/12/23   Deloris Freeman MD   semaglutide (Ozempic) 1 mg/dose (4 mg/3 mL) pen injector INJECT 1 MG SUBCUTANEOUSLY WEEKLY 11/11/23   Deloris Freeman MD   semaglutide (Ozempic) 2 mg/dose (8 mg/3 mL) pen injector Inject under the skin. 1/25/22   Historical Provider, MD   sildenafil (Viagra) 50 mg tablet Take by mouth. 8/31/20   Historical Provider, MD   tadalafil (Cialis) 5 mg tablet Take 1 tablet (5 mg) by mouth once daily. 11/20/23 3/19/24  Russell Soni MD   tamsulosin (Flomax) 0.4 mg 24 hr capsule Take 2 capsules (0.8 mg) by mouth once daily. 11/6/23 11/5/24  Deloris Freeman MD        PAT ROS:   Constitutional:   neg    Neuro/Psych:   Eyes:   Ears:   Nose:   neg    Mouth:   neg    Throat:   neg    Neck:   neg    Cardio:   neg    Respiratory:   neg    Endocrine:   GI:   neg    :   neg    Musculoskeletal:   neg    Hematologic:   neg    Skin:  neg   "      Physical Exam  Vitals reviewed.   Constitutional:       Appearance: Normal appearance.   HENT:      Head: Normocephalic.      Mouth/Throat:      Mouth: Mucous membranes are dry.   Eyes:      Extraocular Movements: Extraocular movements intact.      Pupils: Pupils are equal, round, and reactive to light.   Cardiovascular:      Rate and Rhythm: Normal rate and regular rhythm.      Pulses: Normal pulses.      Heart sounds: Normal heart sounds.   Pulmonary:      Effort: Pulmonary effort is normal.      Breath sounds: Normal breath sounds.   Musculoskeletal:         General: Normal range of motion.      Cervical back: Normal range of motion.   Skin:     General: Skin is warm.   Neurological:      Mental Status: He is alert and oriented to person, place, and time.   Psychiatric:         Mood and Affect: Mood normal.         Behavior: Behavior normal.          PAT AIRWAY:   Airway:     Mallampati::  Unable to assess  normal      /66   Pulse 89   Temp 36 °C (96.8 °F)   Resp 18   Ht 1.638 m (5' 4.5\")   Wt 76.6 kg (168 lb 14 oz)   SpO2 97%   BMI 28.54 kg/m²     Lab Results   Component Value Date    WBC 7.6 01/02/2024    HGB 14.0 01/02/2024    HCT 47.4 01/02/2024    MCV 88 01/02/2024     01/02/2024     Results from last 7 days   Lab Units 01/02/24  1032   SODIUM mmol/L 137   POTASSIUM mmol/L 4.7   CHLORIDE mmol/L 100   CO2 mmol/L 30   BUN mg/dL 16   CREATININE mg/dL 0.94   CALCIUM mg/dL 9.4   PROTEIN TOTAL g/dL 6.8   BILIRUBIN TOTAL mg/dL 0.6   ALK PHOS U/L 83   ALT U/L 10   AST U/L 10   GLUCOSE mg/dL 152*     EKG 11/21/23  NSR    ASSESSMENT/PLAN    Patient is a 63year-old  scheduled for Robot Assisted Prostatectomy  with Dr. EWING   on  1/8/24 .  CARDIOVASCULAR:  RCRI score / Risk: The patients score is 1 based on history . Per ACC/AHA guidelines this places him at  6.0% risk for MACE undergoing a low  risk procedure . The patient has the following risk factors:IDDM  Functional Capacity: The " patients exercise tolerance is  4  METS. This is based on the patients.WALK  . Patient denies  active cardiac symptoms or anginal equivalents .      PULMONARY:  The patient has the following factors that place them at increased risk of perioperative pulmonary complications;age greater than 60/BMI greater than 27/LEO /greater than 2. hour procedure.  Postoperatively the patient would benefit from early pulmonary toilet/incentive spirometry q 1-2 hours while awake/pulse oximetry/cautious use of respiratory depressant medications such as opioids/elevate the HOB/oral hygiene.    DM:  The patient has well controlled diabetes.Currently the patient manages their diabetes with oral agents/insulin, his  recent A1C was 6.3dg/ml on11/6/23.  The morning of surgery, the patient  was told to hold regular insulin and hold oral antihyperglycemic medications.Hold Jardiance 3 days before surgry .  The night before surgery the patient was instructed to administer  half their long acting insulin.      BPH  Pt is on (alpha 5- reductase inhibitors) Recommendations: continue throughout perioperative period, monitior for urinary retention, avoid castillo catheter if able..      DVT:  CAPRINI SCORE=7  The patient has the following factors that increase his  Risk for thrombus formation ; Virchow's triad , , AGE>60, BMI>25, iddm, PROSTATECA,Surgical procedure >2 hrs  procedure .    Recommendations: DVT prophylaxis  per Dr. Tan  protocol . SCD's, REBEKAH's, and early ambulation are recommended. Heparin or LMWH is recommended for the very high risk .      Risk assessment complete.  Patient is scheduled for  intermediate   surgical risk procedure.  Patient is considered an ACCEPTABLE  risk to proceed with the planned procedure.      Preoperative medication instructions were provided and reviewed with the patient.  Any additional testing or evaluation was explained to the patient.  Nothing by mouth instructions were discussed and patient's questions  were answered prior to conclusion to this encounter.  Patient verbalized understanding of preoperative instructions given in preadmission testing; discharge instructions available in EMR.

## 2024-01-02 NOTE — H&P (VIEW-ONLY)
CPM/PAT Evaluation       Name: Ko Porter (Ko Porter)  /Age: 1960/63 y.o.     SURGEON :DR TK SONI   Surgery, Date, and Length:  Robot Assisted Prostatectomy , 24  HPI:  This a  63 y.o. male who presents for presurgical evaluation for for above mentioned procedure    . Pt states he had an elevated PSA on routine screen. He hasd an MRI showing suspicious lesions . Biopsies positive for adenocarcinoma After discussion of the risks and benefits with Dr. Soni the patient elects to proceed with the planned procedure.       Past Medical History:   Diagnosis Date    Achilles tendinitis 2023    Anxiety 2023    BPH (benign prostatic hyperplasia)     Diabetes mellitus (CMS/HCC) 2023    A1C= 6.9% on 23    ED (erectile dysfunction)     GERD (gastroesophageal reflux disease)     Hyperlipidemia     Hypertension     LEO (obstructive sleep apnea)     Prostate CA (CMS/HCC)        Past Surgical History:   Procedure Laterality Date    APPENDECTOMY  2016    Appendectomy    COLONOSCOPY  10/02/2013    Complete Colonoscopy    OTHER SURGICAL HISTORY Left 10/30/2020    Cataract surgery     Anesthesia History    SORE THROAT 2/2 LARGE ET TUBE     Pt denies any past history of anesthetic complications such as PONV, awareness, prolonged sedation, dental damage, aspiration, cardiac arrest, difficult intubation, difficult I.V. access or unexpected hospital admissions.  NO malignant hyperthermia and or pseudo cholinesterase deficiency.    The patient is not is not  a [facility] and will accept blood and blood products if medically indicated.   No history of blood transfusions .Type and screen sent.     Social History  Social History     Substance and Sexual Activity   Drug Use Never      Social History     Substance and Sexual Activity   Alcohol Use Not Currently      Social History     Tobacco Use   Smoking Status Never   Smokeless Tobacco Never          Family History   Problem  Relation Name Age of Onset    Diabetes Father      Cancer Maternal Grandfather         No Known Allergies    Prior to Admission medications    Medication Sig Start Date End Date Taking? Authorizing Provider   aspirin 81 mg EC tablet Take 1 tablet (81 mg) by mouth once daily. 12/10/13   Historical Provider, MD   atorvastatin (Lipitor) 10 mg tablet Take 1 tablet (10 mg) by mouth once daily. 6/26/23   Enmanuel Lui MD   empagliflozin (Jardiance) 10 mg Take 1 tablet (10 mg) by mouth once daily. 4/18/23   Historical Provider, MD   FreeStyle Reg 2 Sensor kit USE FOR 14 DAYS AND REPLACE. 9/21/23   Historical Provider, MD   insulin aspart (NovoLOG FlexPen U-100 Insulin) 100 unit/mL (3 mL) pen Inject under the skin. 1/20/16   Historical Provider, MD   insulin glargine (Basaglar KwikPen U-100 Insulin) 100 unit/mL (3 mL) pen Inject under the skin once daily. 12/10/13   Historical Provider, MD   lisinopril 20 mg tablet Take 1 tablet (20 mg) by mouth once daily. 12/10/13   Historical Provider, MD   metFORMIN (Glucophage) 1,000 mg tablet TAKE 1 TABLET TWICE A DAY 10/12/23   Deloris Freeman MD   semaglutide (Ozempic) 1 mg/dose (4 mg/3 mL) pen injector INJECT 1 MG SUBCUTANEOUSLY WEEKLY 11/11/23   Deloris Freeman MD   semaglutide (Ozempic) 2 mg/dose (8 mg/3 mL) pen injector Inject under the skin. 1/25/22   Historical Provider, MD   sildenafil (Viagra) 50 mg tablet Take by mouth. 8/31/20   Historical Provider, MD   tadalafil (Cialis) 5 mg tablet Take 1 tablet (5 mg) by mouth once daily. 11/20/23 3/19/24  Russell Soni MD   tamsulosin (Flomax) 0.4 mg 24 hr capsule Take 2 capsules (0.8 mg) by mouth once daily. 11/6/23 11/5/24  Deloris Freeman MD        PAT ROS:   Constitutional:   neg    Neuro/Psych:   Eyes:   Ears:   Nose:   neg    Mouth:   neg    Throat:   neg    Neck:   neg    Cardio:   neg    Respiratory:   neg    Endocrine:   GI:   neg    :   neg    Musculoskeletal:   neg    Hematologic:   neg    Skin:  neg   "      Physical Exam  Vitals reviewed.   Constitutional:       Appearance: Normal appearance.   HENT:      Head: Normocephalic.      Mouth/Throat:      Mouth: Mucous membranes are dry.   Eyes:      Extraocular Movements: Extraocular movements intact.      Pupils: Pupils are equal, round, and reactive to light.   Cardiovascular:      Rate and Rhythm: Normal rate and regular rhythm.      Pulses: Normal pulses.      Heart sounds: Normal heart sounds.   Pulmonary:      Effort: Pulmonary effort is normal.      Breath sounds: Normal breath sounds.   Musculoskeletal:         General: Normal range of motion.      Cervical back: Normal range of motion.   Skin:     General: Skin is warm.   Neurological:      Mental Status: He is alert and oriented to person, place, and time.   Psychiatric:         Mood and Affect: Mood normal.         Behavior: Behavior normal.          PAT AIRWAY:   Airway:     Mallampati::  Unable to assess  normal      /66   Pulse 89   Temp 36 °C (96.8 °F)   Resp 18   Ht 1.638 m (5' 4.5\")   Wt 76.6 kg (168 lb 14 oz)   SpO2 97%   BMI 28.54 kg/m²     Lab Results   Component Value Date    WBC 7.6 01/02/2024    HGB 14.0 01/02/2024    HCT 47.4 01/02/2024    MCV 88 01/02/2024     01/02/2024     Results from last 7 days   Lab Units 01/02/24  1032   SODIUM mmol/L 137   POTASSIUM mmol/L 4.7   CHLORIDE mmol/L 100   CO2 mmol/L 30   BUN mg/dL 16   CREATININE mg/dL 0.94   CALCIUM mg/dL 9.4   PROTEIN TOTAL g/dL 6.8   BILIRUBIN TOTAL mg/dL 0.6   ALK PHOS U/L 83   ALT U/L 10   AST U/L 10   GLUCOSE mg/dL 152*     EKG 11/21/23  NSR    ASSESSMENT/PLAN    Patient is a 63year-old  scheduled for Robot Assisted Prostatectomy  with Dr. EWING   on  1/8/24 .  CARDIOVASCULAR:  RCRI score / Risk: The patients score is 1 based on history . Per ACC/AHA guidelines this places him at  6.0% risk for MACE undergoing a low  risk procedure . The patient has the following risk factors:IDDM  Functional Capacity: The " patients exercise tolerance is  4  METS. This is based on the patients.WALK  . Patient denies  active cardiac symptoms or anginal equivalents .      PULMONARY:  The patient has the following factors that place them at increased risk of perioperative pulmonary complications;age greater than 60/BMI greater than 27/LEO /greater than 2. hour procedure.  Postoperatively the patient would benefit from early pulmonary toilet/incentive spirometry q 1-2 hours while awake/pulse oximetry/cautious use of respiratory depressant medications such as opioids/elevate the HOB/oral hygiene.    DM:  The patient has well controlled diabetes.Currently the patient manages their diabetes with oral agents/insulin, his  recent A1C was 6.3dg/ml on11/6/23.  The morning of surgery, the patient  was told to hold regular insulin and hold oral antihyperglycemic medications.Hold Jardiance 3 days before surgry .  The night before surgery the patient was instructed to administer  half their long acting insulin.      BPH  Pt is on (alpha 5- reductase inhibitors) Recommendations: continue throughout perioperative period, monitior for urinary retention, avoid castillo catheter if able..      DVT:  CAPRINI SCORE=7  The patient has the following factors that increase his  Risk for thrombus formation ; Virchow's triad , , AGE>60, BMI>25, iddm, PROSTATECA,Surgical procedure >2 hrs  procedure .    Recommendations: DVT prophylaxis  per Dr. Tan  protocol . SCD's, REBEKAH's, and early ambulation are recommended. Heparin or LMWH is recommended for the very high risk .      Risk assessment complete.  Patient is scheduled for  intermediate   surgical risk procedure.  Patient is considered an ACCEPTABLE  risk to proceed with the planned procedure.      Preoperative medication instructions were provided and reviewed with the patient.  Any additional testing or evaluation was explained to the patient.  Nothing by mouth instructions were discussed and patient's questions  were answered prior to conclusion to this encounter.  Patient verbalized understanding of preoperative instructions given in preadmission testing; discharge instructions available in EMR.

## 2024-01-02 NOTE — PREPROCEDURE INSTRUCTIONS
Medication List            Accurate as of January 2, 2024  9:28 AM. Always use your most recent med list.                aspirin 81 mg EC tablet  Medication Adjustments for Surgery: Take morning of surgery with sip of water, no other fluids     atorvastatin 10 mg tablet  Commonly known as: Lipitor  Take 1 tablet (10 mg) by mouth once daily.  Medication Adjustments for Surgery: Take morning of surgery with sip of water, no other fluids     Basaglar KwikPen U-100 Insulin 100 unit/mL (3 mL) pen  Generic drug: insulin glargine  Notes to patient: IF TAKEN AT NIGHT:TAKE 1/2 YOUR USUAL DOSE THE NIGHT BEFORE SURGERY   IF TAKEN IN AM: DO NOT TAKE ANY BASAGLAR AM OF SURGERY      FreeStyle Reg 2 Sensor kit  Generic drug: FreeStyle Reg sensor system  Notes to patient: USE as INSTRUCTED      Jardiance 10 mg  Generic drug: empagliflozin  Medication Adjustments for Surgery: Stop 3 days before surgery     lisinopril 20 mg tablet  Medication Adjustments for Surgery: Continue until night before surgery     metFORMIN 1,000 mg tablet  Commonly known as: Glucophage  TAKE 1 TABLET TWICE A DAY  Medication Adjustments for Surgery: Stop 1 day before surgery     multivitamin tablet  Medication Adjustments for Surgery: Stop 7 days before surgery     NovoLOG Flexpen U-100 Insulin 100 unit/mL (3 mL) pen  Generic drug: insulin aspart  Notes to patient: DO NOT TAKE ANY INSULIN AM OF SURGERY      * Ozempic 2 mg/dose (8 mg/3 mL) pen injector  Generic drug: semaglutide  Medication Adjustments for Surgery: Stop 7 days before surgery     * Ozempic 1 mg/dose (4 mg/3 mL) pen injector  Generic drug: semaglutide  INJECT 1 MG SUBCUTANEOUSLY WEEKLY  Medication Adjustments for Surgery: Stop 7 days before surgery     sildenafil 50 mg tablet  Commonly known as: Viagra  Medication Adjustments for Surgery: Stop 2 days before surgery     tadalafil 5 mg tablet  Commonly known as: Cialis  Take 1 tablet (5 mg) by mouth once daily.  Medication Adjustments for  Surgery: Stop 2 days before surgery     tamsulosin 0.4 mg 24 hr capsule  Commonly known as: Flomax  Take 2 capsules (0.8 mg) by mouth once daily.  Medication Adjustments for Surgery: Take morning of surgery with sip of water, no other fluids           * This list has 2 medication(s) that are the same as other medications prescribed for you. Read the directions carefully, and ask your doctor or other care provider to review them with you.                            CONTACT SURGEON'S OFFICE IF YOU DEVELOP:  * Fever = 100.4 F   * New respiratory symptoms (e.g. cough, shortness of breath, respiratory distress, sore throat)  * Recent loss of taste or smell  *Flu like symptoms such as headache, fatigue or gastrointestinal symptoms  * You develop any open sores, shingles, burning or painful urination   AND/OR:  * You no longer wish to have the surgery.  * Any other personal circumstances change that may lead to the need to cancel or defer this surgery.  *You were admitted to any hospital within one week of your planned procedure.    SMOKING:  *Quitting smoking can make a huge difference to your health and recovery from surgery.    *If you need help with quitting, call 9-644-QUIT-NOW.    THE DAY BEFORE SURGERY:  *Do not eat any food after midnight the night before surgery.   *You are permitted to drink clear liquids (i.e. water, black coffee, tea, clear broth, apple juice) up to 2 hours before your surgery.  DIABETICS:  Please check fasting blood sugar  upon waking up.  If fasting sugar is <80 mg/dl, please drink 100ml/3oz of apple juice no later than 2 hours prior to surgery.      SURGICAL TIME  *You will be contacted between 2 p.m. and 6 p.m. the business day before your surgery with your arrival time.  *If you haven't received a call by 6pm, call [phone].  *Scheduled surgery times may change and you will be notified if this occurs-check your personal voicemail for any updates.    ON THE MORNING OF SURGERY:  *Wear  comfortable, loose fitting clothing.   *Do not use moisturizers, creams, lotions or perfume.  *All jewelry and valuables should be left at home.  *Prosthetic devices such as contact lenses, hearing aids, dentures, eyelash extensions, hairpins and body piercing must be removed before surgery.    BRING WITH YOU:  *Photo ID and insurance card  *Current list of medicines and allergies  *Pacemaker/Defibrillator/Heart stent cards  *CPAP machine and mask  *Slings/splints/crutches  *Copy of your complete Advanced Directive/DHPOA-if applicable  *Neurostimulator implant remote    PARKING AND ARRIVAL:  *Check in at the Main Entrance desk and let them know you are here for surgery.  *You will be directed to the 2nd floor surgical waiting area.    AFTER OUTPATIENT SURGERY:  *A responsible adult MUST accompany you at the time of discharge and stay with you for 24 hours after your surgery.  *You may NOT drive yourself home after surgery.  *You may use a taxi or ride sharing service ([employer], Uber) to return home ONLY if you are accompanied by a friend or family member.  *Instructions for resuming your medications will be provided by your surgeon.      YOU HAVE REVIEWED THE MEDICATIONS ON THIS SHEET AND YOU VERIFY THESE ARE ALL THE MEDICATIONS AND OVER THE COUNTER MEDICATIONS THAT YOU TAKE .

## 2024-01-07 NOTE — DISCHARGE INSTRUCTIONS
DEPARTMENT OF Urology  DISCHARGE INSTRUCTIONS Prostatectomy  Inpatient Surgery    C O N [facility] E N T I A L   I N F O R M A T I O N    Ko T Johnson      Call [phone] during regular daytime business hours (8:00 am - 5:00 pm) and after 5:00 pm and ask for the Urology resident with any questions or concerns.    If it is a life-threatening situation, proceed to the nearest emergency department.        Follow-up appointments:    1/11/2024 with Julio Cesar Helm at 10AM for catheter removal   [address] [facility]  [address]  [facility] [address]     1/29/2024 with Dr. Russell Soni at 10:40 AM for postoperative check  [address] [facility] [address]     Thank you for the opportunity to care for you today.  Your health and healing are very important to us.  We hope we made you feel as comfortable as possible and are committed to your recovery and continued well-being.      The following is a brief overview of your prostatectomy procedure. Some of the information contained on this summary may be confidential.  This information should be kept in your records and should be shared with your regular doctor.    Physicians:   Dr. Russell Soni    Procedure performed: removal of your prostate  Pending Results: prostate pathology    What to Expect During your Recovery and Home Care  Anesthesia Side Effects   You may feel sleepy, tired, or have a sore throat.   You may also feel drowsiness, dizziness, or inability to think clearly.  For your safety, do not drive, drink alcoholic beverages, take any unprescribed medication or make any important decisions for 24 hours after anesthesia.  A responsible adult should be with you for 24 hours.        Activity and Recovery    No heavy lifting greater than 10 pounds for the next 4-6 weeks. No driving until mobility has returned to normal. Do not drive or operate heavy machinery while taking narcotic pain medications as these medications can alter perception, impair judgement, and slow  reaction times.  Pain Control  Unfortunately, you may experience pain after your procedure. Adequate pain management can include alternative measures to help ease your pain and that can include over the counter Tylenol/ibuprofen which can be taken as prescribed as needed for breakthrough pain. Do not take more than 4,000mg of Tylenol in a 24-hour period.      Your procedure was done robotically so you may experience gas pains from the surgery. Getting up and moving around is the best way to relieve those pains. You can also take some over the counter gas-x(simethicone).    Nausea/Vomiting   Clear liquids are best tolerated at first. Start slow, advance your diet as tolerated to normal foods. Avoid spicy, greasy, heavy foods at first. Also, you may feel nauseous or like you need to vomit if you take any type of medication on an empty stomach.  Call your physician if you are unable to eat or drink, are not passing gas and have persistent vomiting.    Signs of Bleeding   You could have some blood in your urine off and on over the next several weeks. Your urine will be light pink to yellow. Minor bleeding or drainage may occur from the surgical sites; however, excessive or consistent bleeding should be reported to your surgeon. Excessive bleeding is defined as blood that is dripping from wound, soaking you bandages, and is ketchup colored, thick with possible blood clots.  Consistent is defined as bleeding that does not stop.      Treatment/wound care:   Keep area(s) clean and dry.   It is okay to shower 24 hours after time of surgery.    Do not scrub wound(s), pat dry.    Do not submerge wound(s) in standing water until seen for follow up appointment (no tub bathing, swimming, or hot tubs).    Clean with mild soap, gentle washing, pat dry, cover with bandage as needed.    Avoid waterproof bandages.  No oils or lotions on incisions.  Staples/sutures removed in our [id number] days after the date of surgery.  Do not  remove the staples/sutures on your own.    Please visually inspect your wound(s) at least once daily.  If the wound(s) are in a difficult to see location, please use a mirror or have someone else assist with visual inspection.    Signs of Infection  Signs of infection can include fever, chills, redness around surgical incisions, green/yellow drainage from incisions, or severe abdominal pain.  If you see any of these occur, please contact your doctor's office at [phone].  Any fever higher than 100.4, especially if associated with an ill feeling, abdominal pain, chills, or nausea should be reported to your surgeon.      Assist in bowel movements/urination  Take daily stool softener you were prescribed  Increase fiber in diet  Increase water (6 to 8 glasses)  Increase walking   Can try adding over the counter MiraLAX or in extreme cases milk of magnesia.  If you have tried these methods and you are not passing gas, your bladder still feels full and you cannot have a bowel movement, please go to your nearest Emergency room/contact your physician.    Additional Instructions:   Use a small pillow to put pressure on your belly. This can make you more comfortable when you cough, laugh or do other actions.     CATHETER CARE  Always keep the catheters tubing and drainage bag below the level of your bladder.  Avoid loops and kinks in the catheter tubing.  NOTIFY your physician if catheter falls out or catheter seems clogged and urine is not draining.   Do not wear the small leg bag to bed you should be provided with a larger overnight bag that you should wear to bed and can hang over the side of the bed.  We recommend wearing the large bag in the shower as well as this is easy to dry, and you do not get your leg straps wet from your leg bag.   Your catheter should be secured to your upper thigh, do not allow it to hang or dangle.

## 2024-01-08 ENCOUNTER — ANESTHESIA EVENT (OUTPATIENT)
Dept: OPERATING ROOM | Facility: HOSPITAL | Age: 64
End: 2024-01-08
Payer: COMMERCIAL

## 2024-01-08 ENCOUNTER — PREP FOR PROCEDURE (OUTPATIENT)
Dept: UROLOGY | Facility: HOSPITAL | Age: 64
End: 2024-01-08

## 2024-01-08 ENCOUNTER — ANESTHESIA (OUTPATIENT)
Dept: OPERATING ROOM | Facility: HOSPITAL | Age: 64
End: 2024-01-08
Payer: COMMERCIAL

## 2024-01-08 ENCOUNTER — HOSPITAL ENCOUNTER (OUTPATIENT)
Facility: HOSPITAL | Age: 64
LOS: 1 days | Discharge: HOME | End: 2024-01-09
Attending: STUDENT IN AN ORGANIZED HEALTH CARE EDUCATION/TRAINING PROGRAM | Admitting: STUDENT IN AN ORGANIZED HEALTH CARE EDUCATION/TRAINING PROGRAM
Payer: COMMERCIAL

## 2024-01-08 DIAGNOSIS — C61 PROSTATE CANCER (MULTI): ICD-10-CM

## 2024-01-08 DIAGNOSIS — C61 MALIGNANT NEOPLASM OF PROSTATE (MULTI): Primary | ICD-10-CM

## 2024-01-08 PROBLEM — T88.4XXA DIFFICULT INTUBATION: Status: ACTIVE | Noted: 2024-01-08

## 2024-01-08 LAB
ABO GROUP (TYPE) IN BLOOD: NORMAL
GLUCOSE BLD MANUAL STRIP-MCNC: 127 MG/DL (ref 74–99)
GLUCOSE BLD MANUAL STRIP-MCNC: 255 MG/DL (ref 74–99)
GLUCOSE BLD MANUAL STRIP-MCNC: 266 MG/DL (ref 74–99)
GLUCOSE BLD MANUAL STRIP-MCNC: 268 MG/DL (ref 74–99)
RH FACTOR (ANTIGEN D): NORMAL

## 2024-01-08 PROCEDURE — 36415 COLL VENOUS BLD VENIPUNCTURE: CPT | Performed by: STUDENT IN AN ORGANIZED HEALTH CARE EDUCATION/TRAINING PROGRAM

## 2024-01-08 PROCEDURE — 2720000007 HC OR 272 NO HCPCS: Performed by: STUDENT IN AN ORGANIZED HEALTH CARE EDUCATION/TRAINING PROGRAM

## 2024-01-08 PROCEDURE — 2500000005 HC RX 250 GENERAL PHARMACY W/O HCPCS: Performed by: STUDENT IN AN ORGANIZED HEALTH CARE EDUCATION/TRAINING PROGRAM

## 2024-01-08 PROCEDURE — A55867 PR LAPS SURG PRST8ECT SMPL STOT ROBOTIC ASSISTANCE: Performed by: ANESTHESIOLOGIST ASSISTANT

## 2024-01-08 PROCEDURE — 82947 ASSAY GLUCOSE BLOOD QUANT: CPT

## 2024-01-08 PROCEDURE — 88307 TISSUE EXAM BY PATHOLOGIST: CPT | Mod: TC | Performed by: STUDENT IN AN ORGANIZED HEALTH CARE EDUCATION/TRAINING PROGRAM

## 2024-01-08 PROCEDURE — 7100000001 HC RECOVERY ROOM TIME - INITIAL BASE CHARGE: Performed by: STUDENT IN AN ORGANIZED HEALTH CARE EDUCATION/TRAINING PROGRAM

## 2024-01-08 PROCEDURE — 2500000001 HC RX 250 WO HCPCS SELF ADMINISTERED DRUGS (ALT 637 FOR MEDICARE OP): Performed by: ANESTHESIOLOGIST ASSISTANT

## 2024-01-08 PROCEDURE — 1100000001 HC PRIVATE ROOM DAILY

## 2024-01-08 PROCEDURE — 2500000002 HC RX 250 W HCPCS SELF ADMINISTERED DRUGS (ALT 637 FOR MEDICARE OP, ALT 636 FOR OP/ED): Performed by: STUDENT IN AN ORGANIZED HEALTH CARE EDUCATION/TRAINING PROGRAM

## 2024-01-08 PROCEDURE — 2500000004 HC RX 250 GENERAL PHARMACY W/ HCPCS (ALT 636 FOR OP/ED): Performed by: ANESTHESIOLOGIST ASSISTANT

## 2024-01-08 PROCEDURE — 3700000001 HC GENERAL ANESTHESIA TIME - INITIAL BASE CHARGE: Performed by: STUDENT IN AN ORGANIZED HEALTH CARE EDUCATION/TRAINING PROGRAM

## 2024-01-08 PROCEDURE — 2500000004 HC RX 250 GENERAL PHARMACY W/ HCPCS (ALT 636 FOR OP/ED): Performed by: STUDENT IN AN ORGANIZED HEALTH CARE EDUCATION/TRAINING PROGRAM

## 2024-01-08 PROCEDURE — C1765 ADHESION BARRIER: HCPCS | Performed by: STUDENT IN AN ORGANIZED HEALTH CARE EDUCATION/TRAINING PROGRAM

## 2024-01-08 PROCEDURE — 7100000002 HC RECOVERY ROOM TIME - EACH INCREMENTAL 1 MINUTE: Performed by: STUDENT IN AN ORGANIZED HEALTH CARE EDUCATION/TRAINING PROGRAM

## 2024-01-08 PROCEDURE — 3700000002 HC GENERAL ANESTHESIA TIME - EACH INCREMENTAL 1 MINUTE: Performed by: STUDENT IN AN ORGANIZED HEALTH CARE EDUCATION/TRAINING PROGRAM

## 2024-01-08 PROCEDURE — 88309 TISSUE EXAM BY PATHOLOGIST: CPT | Performed by: PATHOLOGY

## 2024-01-08 PROCEDURE — 3600000018 HC OR TIME - INITIAL BASE CHARGE - PROCEDURE LEVEL SIX: Performed by: STUDENT IN AN ORGANIZED HEALTH CARE EDUCATION/TRAINING PROGRAM

## 2024-01-08 PROCEDURE — 99231 SBSQ HOSP IP/OBS SF/LOW 25: CPT

## 2024-01-08 PROCEDURE — C1713 ANCHOR/SCREW BN/BN,TIS/BN: HCPCS | Performed by: STUDENT IN AN ORGANIZED HEALTH CARE EDUCATION/TRAINING PROGRAM

## 2024-01-08 PROCEDURE — 2500000004 HC RX 250 GENERAL PHARMACY W/ HCPCS (ALT 636 FOR OP/ED): Performed by: ANESTHESIOLOGY

## 2024-01-08 PROCEDURE — A4217 STERILE WATER/SALINE, 500 ML: HCPCS | Performed by: STUDENT IN AN ORGANIZED HEALTH CARE EDUCATION/TRAINING PROGRAM

## 2024-01-08 PROCEDURE — 2500000005 HC RX 250 GENERAL PHARMACY W/O HCPCS: Performed by: ANESTHESIOLOGIST ASSISTANT

## 2024-01-08 PROCEDURE — 88307 TISSUE EXAM BY PATHOLOGIST: CPT | Performed by: PATHOLOGY

## 2024-01-08 PROCEDURE — A55867 PR LAPS SURG PRST8ECT SMPL STOT ROBOTIC ASSISTANCE: Performed by: ANESTHESIOLOGY

## 2024-01-08 PROCEDURE — 2780000003 HC OR 278 NO HCPCS: Performed by: STUDENT IN AN ORGANIZED HEALTH CARE EDUCATION/TRAINING PROGRAM

## 2024-01-08 PROCEDURE — 2500000001 HC RX 250 WO HCPCS SELF ADMINISTERED DRUGS (ALT 637 FOR MEDICARE OP): Performed by: STUDENT IN AN ORGANIZED HEALTH CARE EDUCATION/TRAINING PROGRAM

## 2024-01-08 PROCEDURE — 3600000017 HC OR TIME - EACH INCREMENTAL 1 MINUTE - PROCEDURE LEVEL SIX: Performed by: STUDENT IN AN ORGANIZED HEALTH CARE EDUCATION/TRAINING PROGRAM

## 2024-01-08 RX ORDER — GABAPENTIN 300 MG/1
600 CAPSULE ORAL ONCE
Status: COMPLETED | OUTPATIENT
Start: 2024-01-08 | End: 2024-01-08

## 2024-01-08 RX ORDER — SODIUM CHLORIDE 0.9 G/100ML
IRRIGANT IRRIGATION AS NEEDED
Status: DISCONTINUED | OUTPATIENT
Start: 2024-01-08 | End: 2024-01-08 | Stop reason: HOSPADM

## 2024-01-08 RX ORDER — LIDOCAINE HYDROCHLORIDE 20 MG/ML
INJECTION, SOLUTION INFILTRATION; PERINEURAL AS NEEDED
Status: DISCONTINUED | OUTPATIENT
Start: 2024-01-08 | End: 2024-01-08

## 2024-01-08 RX ORDER — ROCURONIUM BROMIDE 10 MG/ML
INJECTION, SOLUTION INTRAVENOUS AS NEEDED
Status: DISCONTINUED | OUTPATIENT
Start: 2024-01-08 | End: 2024-01-08

## 2024-01-08 RX ORDER — MIDAZOLAM HYDROCHLORIDE 1 MG/ML
INJECTION, SOLUTION INTRAMUSCULAR; INTRAVENOUS AS NEEDED
Status: DISCONTINUED | OUTPATIENT
Start: 2024-01-08 | End: 2024-01-08

## 2024-01-08 RX ORDER — SODIUM CHLORIDE, SODIUM LACTATE, POTASSIUM CHLORIDE, CALCIUM CHLORIDE 600; 310; 30; 20 MG/100ML; MG/100ML; MG/100ML; MG/100ML
125 INJECTION, SOLUTION INTRAVENOUS CONTINUOUS
Status: DISCONTINUED | OUTPATIENT
Start: 2024-01-08 | End: 2024-01-09 | Stop reason: HOSPADM

## 2024-01-08 RX ORDER — HEPARIN SODIUM 5000 [USP'U]/ML
5000 INJECTION, SOLUTION INTRAVENOUS; SUBCUTANEOUS ONCE
Status: COMPLETED | OUTPATIENT
Start: 2024-01-08 | End: 2024-01-08

## 2024-01-08 RX ORDER — INSULIN LISPRO 100 [IU]/ML
0-5 INJECTION, SOLUTION INTRAVENOUS; SUBCUTANEOUS
Status: DISCONTINUED | OUTPATIENT
Start: 2024-01-08 | End: 2024-01-09 | Stop reason: HOSPADM

## 2024-01-08 RX ORDER — DEXTROSE MONOHYDRATE 100 MG/ML
0.3 INJECTION, SOLUTION INTRAVENOUS ONCE AS NEEDED
Status: DISCONTINUED | OUTPATIENT
Start: 2024-01-08 | End: 2024-01-09 | Stop reason: HOSPADM

## 2024-01-08 RX ORDER — TADALAFIL 5 MG/1
5 TABLET ORAL DAILY
Qty: 90 TABLET | Refills: 1 | Status: SHIPPED | OUTPATIENT
Start: 2024-01-08 | End: 2024-02-05 | Stop reason: SDUPTHER

## 2024-01-08 RX ORDER — GABAPENTIN 300 MG/1
600 CAPSULE ORAL EVERY 8 HOURS SCHEDULED
Status: DISCONTINUED | OUTPATIENT
Start: 2024-01-08 | End: 2024-01-09 | Stop reason: HOSPADM

## 2024-01-08 RX ORDER — SODIUM CHLORIDE, SODIUM LACTATE, POTASSIUM CHLORIDE, CALCIUM CHLORIDE 600; 310; 30; 20 MG/100ML; MG/100ML; MG/100ML; MG/100ML
INJECTION, SOLUTION INTRAVENOUS CONTINUOUS PRN
Status: DISCONTINUED | OUTPATIENT
Start: 2024-01-08 | End: 2024-01-08

## 2024-01-08 RX ORDER — ACETAMINOPHEN 325 MG/1
975 TABLET ORAL EVERY 6 HOURS
Status: DISCONTINUED | OUTPATIENT
Start: 2024-01-08 | End: 2024-01-09 | Stop reason: HOSPADM

## 2024-01-08 RX ORDER — ONDANSETRON HYDROCHLORIDE 2 MG/ML
INJECTION, SOLUTION INTRAVENOUS AS NEEDED
Status: DISCONTINUED | OUTPATIENT
Start: 2024-01-08 | End: 2024-01-08

## 2024-01-08 RX ORDER — HEPARIN SODIUM 5000 [USP'U]/ML
5000 INJECTION, SOLUTION INTRAVENOUS; SUBCUTANEOUS EVERY 8 HOURS
Status: DISCONTINUED | OUTPATIENT
Start: 2024-01-08 | End: 2024-01-09 | Stop reason: HOSPADM

## 2024-01-08 RX ORDER — SODIUM CHLORIDE 9 MG/ML
100 INJECTION, SOLUTION INTRAVENOUS CONTINUOUS
Status: DISCONTINUED | OUTPATIENT
Start: 2024-01-08 | End: 2024-01-08

## 2024-01-08 RX ORDER — PHENYLEPHRINE HCL IN 0.9% NACL 0.4MG/10ML
SYRINGE (ML) INTRAVENOUS AS NEEDED
Status: DISCONTINUED | OUTPATIENT
Start: 2024-01-08 | End: 2024-01-08

## 2024-01-08 RX ORDER — POLYETHYLENE GLYCOL 3350 17 G/17G
17 POWDER, FOR SOLUTION ORAL DAILY
Qty: 7 PACKET | Refills: 0 | Status: SHIPPED | OUTPATIENT
Start: 2024-01-08 | End: 2024-01-15

## 2024-01-08 RX ORDER — SENNOSIDES 8.6 MG/1
2 TABLET ORAL 2 TIMES DAILY
Status: DISCONTINUED | OUTPATIENT
Start: 2024-01-08 | End: 2024-01-09 | Stop reason: HOSPADM

## 2024-01-08 RX ORDER — ACETAMINOPHEN 325 MG/1
650 TABLET ORAL EVERY 4 HOURS PRN
Status: DISCONTINUED | OUTPATIENT
Start: 2024-01-08 | End: 2024-01-08 | Stop reason: HOSPADM

## 2024-01-08 RX ORDER — CEFAZOLIN SODIUM 2 G/100ML
2 INJECTION, SOLUTION INTRAVENOUS ONCE
Status: COMPLETED | OUTPATIENT
Start: 2024-01-08 | End: 2024-01-08

## 2024-01-08 RX ORDER — DEXTROSE 50 % IN WATER (D50W) INTRAVENOUS SYRINGE
25
Status: DISCONTINUED | OUTPATIENT
Start: 2024-01-08 | End: 2024-01-09 | Stop reason: HOSPADM

## 2024-01-08 RX ORDER — ACETAMINOPHEN 325 MG/1
975 TABLET ORAL ONCE
Status: COMPLETED | OUTPATIENT
Start: 2024-01-08 | End: 2024-01-08

## 2024-01-08 RX ORDER — DEXAMETHASONE SODIUM PHOSPHATE 10 MG/ML
INJECTION INTRAMUSCULAR; INTRAVENOUS AS NEEDED
Status: DISCONTINUED | OUTPATIENT
Start: 2024-01-08 | End: 2024-01-08

## 2024-01-08 RX ORDER — LIDOCAINE 560 MG/1
1 PATCH PERCUTANEOUS; TOPICAL; TRANSDERMAL DAILY
Status: DISCONTINUED | OUTPATIENT
Start: 2024-01-08 | End: 2024-01-09 | Stop reason: HOSPADM

## 2024-01-08 RX ORDER — HYDROMORPHONE HYDROCHLORIDE 1 MG/ML
INJECTION, SOLUTION INTRAMUSCULAR; INTRAVENOUS; SUBCUTANEOUS AS NEEDED
Status: DISCONTINUED | OUTPATIENT
Start: 2024-01-08 | End: 2024-01-08

## 2024-01-08 RX ORDER — KETOROLAC TROMETHAMINE 10 MG/1
10 TABLET, FILM COATED ORAL EVERY 6 HOURS PRN
Qty: 20 TABLET | Refills: 0 | Status: SHIPPED | OUTPATIENT
Start: 2024-01-08 | End: 2024-02-05 | Stop reason: WASHOUT

## 2024-01-08 RX ORDER — PROMETHAZINE HYDROCHLORIDE 25 MG/ML
6.25 INJECTION, SOLUTION INTRAMUSCULAR; INTRAVENOUS ONCE AS NEEDED
Status: DISCONTINUED | OUTPATIENT
Start: 2024-01-08 | End: 2024-01-08 | Stop reason: HOSPADM

## 2024-01-08 RX ORDER — POLYETHYLENE GLYCOL 3350 17 G/17G
17 POWDER, FOR SOLUTION ORAL DAILY
Status: DISCONTINUED | OUTPATIENT
Start: 2024-01-08 | End: 2024-01-09 | Stop reason: HOSPADM

## 2024-01-08 RX ORDER — CHLORHEXIDINE GLUCONATE 40 MG/ML
SOLUTION TOPICAL DAILY PRN
Status: DISCONTINUED | OUTPATIENT
Start: 2024-01-08 | End: 2024-01-08

## 2024-01-08 RX ORDER — ACETAMINOPHEN 325 MG/1
650 TABLET ORAL EVERY 6 HOURS PRN
Qty: 30 TABLET | Refills: 0 | Status: SHIPPED | OUTPATIENT
Start: 2024-01-08

## 2024-01-08 RX ORDER — ONDANSETRON HYDROCHLORIDE 2 MG/ML
4 INJECTION, SOLUTION INTRAVENOUS EVERY 8 HOURS PRN
Status: DISCONTINUED | OUTPATIENT
Start: 2024-01-08 | End: 2024-01-09 | Stop reason: HOSPADM

## 2024-01-08 RX ORDER — ONDANSETRON HYDROCHLORIDE 2 MG/ML
4 INJECTION, SOLUTION INTRAVENOUS ONCE AS NEEDED
Status: DISCONTINUED | OUTPATIENT
Start: 2024-01-08 | End: 2024-01-08 | Stop reason: HOSPADM

## 2024-01-08 RX ORDER — ALBUTEROL SULFATE 0.83 MG/ML
2.5 SOLUTION RESPIRATORY (INHALATION) ONCE AS NEEDED
Status: DISCONTINUED | OUTPATIENT
Start: 2024-01-08 | End: 2024-01-08 | Stop reason: HOSPADM

## 2024-01-08 RX ORDER — ATORVASTATIN CALCIUM 10 MG/1
10 TABLET, FILM COATED ORAL DAILY
Status: DISCONTINUED | OUTPATIENT
Start: 2024-01-08 | End: 2024-01-09 | Stop reason: HOSPADM

## 2024-01-08 RX ORDER — OXYCODONE HYDROCHLORIDE 5 MG/1
5 TABLET ORAL EVERY 4 HOURS PRN
Status: DISCONTINUED | OUTPATIENT
Start: 2024-01-08 | End: 2024-01-08 | Stop reason: HOSPADM

## 2024-01-08 RX ORDER — TALC
3 POWDER (GRAM) TOPICAL NIGHTLY PRN
Status: DISCONTINUED | OUTPATIENT
Start: 2024-01-08 | End: 2024-01-09 | Stop reason: HOSPADM

## 2024-01-08 RX ORDER — SODIUM CHLORIDE, SODIUM LACTATE, POTASSIUM CHLORIDE, CALCIUM CHLORIDE 600; 310; 30; 20 MG/100ML; MG/100ML; MG/100ML; MG/100ML
100 INJECTION, SOLUTION INTRAVENOUS CONTINUOUS
Status: DISCONTINUED | OUTPATIENT
Start: 2024-01-08 | End: 2024-01-08 | Stop reason: HOSPADM

## 2024-01-08 RX ORDER — FENTANYL CITRATE 50 UG/ML
INJECTION, SOLUTION INTRAMUSCULAR; INTRAVENOUS AS NEEDED
Status: DISCONTINUED | OUTPATIENT
Start: 2024-01-08 | End: 2024-01-08

## 2024-01-08 RX ORDER — KETOROLAC TROMETHAMINE 30 MG/ML
15 INJECTION, SOLUTION INTRAMUSCULAR; INTRAVENOUS EVERY 8 HOURS PRN
Status: DISCONTINUED | OUTPATIENT
Start: 2024-01-08 | End: 2024-01-09 | Stop reason: HOSPADM

## 2024-01-08 RX ORDER — ASPIRIN 81 MG/1
81 TABLET ORAL DAILY
Status: DISCONTINUED | OUTPATIENT
Start: 2024-01-08 | End: 2024-01-09 | Stop reason: HOSPADM

## 2024-01-08 RX ORDER — METHOCARBAMOL 500 MG/1
500 TABLET, FILM COATED ORAL 4 TIMES DAILY
Qty: 28 TABLET | Refills: 0 | Status: SHIPPED | OUTPATIENT
Start: 2024-01-08 | End: 2024-02-05 | Stop reason: WASHOUT

## 2024-01-08 RX ORDER — METHOCARBAMOL 500 MG/1
500 TABLET, FILM COATED ORAL EVERY 6 HOURS SCHEDULED
Status: DISCONTINUED | OUTPATIENT
Start: 2024-01-08 | End: 2024-01-09 | Stop reason: HOSPADM

## 2024-01-08 RX ORDER — ONDANSETRON 4 MG/1
4 TABLET, FILM COATED ORAL EVERY 8 HOURS PRN
Status: DISCONTINUED | OUTPATIENT
Start: 2024-01-08 | End: 2024-01-09 | Stop reason: HOSPADM

## 2024-01-08 RX ORDER — PROPOFOL 10 MG/ML
INJECTION, EMULSION INTRAVENOUS AS NEEDED
Status: DISCONTINUED | OUTPATIENT
Start: 2024-01-08 | End: 2024-01-08

## 2024-01-08 RX ADMIN — HEPARIN SODIUM 5000 UNITS: 5000 INJECTION INTRAVENOUS; SUBCUTANEOUS at 22:08

## 2024-01-08 RX ADMIN — SODIUM CHLORIDE, POTASSIUM CHLORIDE, SODIUM LACTATE AND CALCIUM CHLORIDE: 600; 310; 30; 20 INJECTION, SOLUTION INTRAVENOUS at 07:05

## 2024-01-08 RX ADMIN — FENTANYL CITRATE 50 MCG: 50 INJECTION, SOLUTION INTRAMUSCULAR; INTRAVENOUS at 09:36

## 2024-01-08 RX ADMIN — ROCURONIUM BROMIDE 40 MG: 10 INJECTION, SOLUTION INTRAVENOUS at 08:02

## 2024-01-08 RX ADMIN — ACETAMINOPHEN 975 MG: 325 TABLET ORAL at 17:07

## 2024-01-08 RX ADMIN — Medication 200 MCG: at 07:56

## 2024-01-08 RX ADMIN — Medication 200 MCG: at 08:05

## 2024-01-08 RX ADMIN — ROCURONIUM BROMIDE 10 MG: 10 INJECTION, SOLUTION INTRAVENOUS at 11:08

## 2024-01-08 RX ADMIN — HYDROMORPHONE HYDROCHLORIDE 0.5 MG: 1 INJECTION, SOLUTION INTRAMUSCULAR; INTRAVENOUS; SUBCUTANEOUS at 11:17

## 2024-01-08 RX ADMIN — CARBOXYMETHYLCELLULOSE SODIUM 2 DROP: 5 SOLUTION/ DROPS OPHTHALMIC at 07:43

## 2024-01-08 RX ADMIN — SENNOSIDES 17.2 MG: 8.6 TABLET, FILM COATED ORAL at 20:58

## 2024-01-08 RX ADMIN — HYDROMORPHONE HYDROCHLORIDE 1 MG: 1 INJECTION, SOLUTION INTRAMUSCULAR; INTRAVENOUS; SUBCUTANEOUS at 10:15

## 2024-01-08 RX ADMIN — ATORVASTATIN CALCIUM 10 MG: 10 TABLET, FILM COATED ORAL at 17:08

## 2024-01-08 RX ADMIN — SUGAMMADEX 200 MG: 100 INJECTION, SOLUTION INTRAVENOUS at 12:05

## 2024-01-08 RX ADMIN — ROCURONIUM BROMIDE 20 MG: 10 INJECTION, SOLUTION INTRAVENOUS at 09:29

## 2024-01-08 RX ADMIN — ACETAMINOPHEN 975 MG: 325 TABLET ORAL at 22:08

## 2024-01-08 RX ADMIN — METHOCARBAMOL TABLETS 500 MG: 500 TABLET, COATED ORAL at 20:58

## 2024-01-08 RX ADMIN — ONDANSETRON 4 MG: 2 INJECTION INTRAMUSCULAR; INTRAVENOUS at 12:05

## 2024-01-08 RX ADMIN — Medication 100 MCG: at 10:55

## 2024-01-08 RX ADMIN — INSULIN LISPRO 3 UNITS: 100 INJECTION, SOLUTION INTRAVENOUS; SUBCUTANEOUS at 17:08

## 2024-01-08 RX ADMIN — LIDOCAINE HYDROCHLORIDE 80 MG: 20 INJECTION, SOLUTION INFILTRATION; PERINEURAL at 07:42

## 2024-01-08 RX ADMIN — ACETAMINOPHEN 975 MG: 325 TABLET ORAL at 06:39

## 2024-01-08 RX ADMIN — FENTANYL CITRATE 50 MCG: 50 INJECTION, SOLUTION INTRAMUSCULAR; INTRAVENOUS at 07:42

## 2024-01-08 RX ADMIN — ASPIRIN 81 MG: 81 TABLET, COATED ORAL at 17:07

## 2024-01-08 RX ADMIN — ROCURONIUM BROMIDE 20 MG: 10 INJECTION, SOLUTION INTRAVENOUS at 11:34

## 2024-01-08 RX ADMIN — Medication 200 MCG: at 07:53

## 2024-01-08 RX ADMIN — HYDROMORPHONE HYDROCHLORIDE 0.5 MG: 1 INJECTION, SOLUTION INTRAMUSCULAR; INTRAVENOUS; SUBCUTANEOUS at 14:58

## 2024-01-08 RX ADMIN — HEPARIN SODIUM 5000 UNITS: 5000 INJECTION INTRAVENOUS; SUBCUTANEOUS at 06:39

## 2024-01-08 RX ADMIN — SODIUM CHLORIDE, POTASSIUM CHLORIDE, SODIUM LACTATE AND CALCIUM CHLORIDE 125 ML/HR: 600; 310; 30; 20 INJECTION, SOLUTION INTRAVENOUS at 17:08

## 2024-01-08 RX ADMIN — MIDAZOLAM HYDROCHLORIDE 2 MG: 1 INJECTION, SOLUTION INTRAMUSCULAR; INTRAVENOUS at 07:34

## 2024-01-08 RX ADMIN — LIDOCAINE 1 PATCH: 4 PATCH TOPICAL at 20:59

## 2024-01-08 RX ADMIN — CEFAZOLIN SODIUM 2 G: 2 INJECTION, SOLUTION INTRAVENOUS at 07:43

## 2024-01-08 RX ADMIN — POLYETHYLENE GLYCOL 3350 17 G: 17 POWDER, FOR SOLUTION ORAL at 20:59

## 2024-01-08 RX ADMIN — ROCURONIUM BROMIDE 60 MG: 10 INJECTION, SOLUTION INTRAVENOUS at 07:42

## 2024-01-08 RX ADMIN — PROPOFOL 150 MG: 10 INJECTION, EMULSION INTRAVENOUS at 07:42

## 2024-01-08 RX ADMIN — HYDROMORPHONE HYDROCHLORIDE 0.5 MG: 1 INJECTION, SOLUTION INTRAMUSCULAR; INTRAVENOUS; SUBCUTANEOUS at 12:03

## 2024-01-08 RX ADMIN — DEXAMETHASONE SODIUM PHOSPHATE 4 MG: 10 INJECTION, SOLUTION INTRAMUSCULAR; INTRAVENOUS at 08:05

## 2024-01-08 RX ADMIN — GABAPENTIN 600 MG: 300 CAPSULE ORAL at 20:58

## 2024-01-08 RX ADMIN — CEFAZOLIN SODIUM 2 G: 2 INJECTION, SOLUTION INTRAVENOUS at 11:34

## 2024-01-08 RX ADMIN — ROCURONIUM BROMIDE 10 MG: 10 INJECTION, SOLUTION INTRAVENOUS at 10:17

## 2024-01-08 RX ADMIN — Medication 400 MCG: at 07:59

## 2024-01-08 RX ADMIN — GABAPENTIN 600 MG: 300 CAPSULE ORAL at 06:39

## 2024-01-08 RX ADMIN — PROPOFOL 50 MG: 10 INJECTION, EMULSION INTRAVENOUS at 09:36

## 2024-01-08 ASSESSMENT — PAIN SCALES - GENERAL
PAINLEVEL_OUTOF10: 0 - NO PAIN
PAINLEVEL_OUTOF10: 0 - NO PAIN

## 2024-01-08 ASSESSMENT — PAIN - FUNCTIONAL ASSESSMENT: PAIN_FUNCTIONAL_ASSESSMENT: 0-10

## 2024-01-08 ASSESSMENT — COLUMBIA-SUICIDE SEVERITY RATING SCALE - C-SSRS
6. HAVE YOU EVER DONE ANYTHING, STARTED TO DO ANYTHING, OR PREPARED TO DO ANYTHING TO END YOUR LIFE?: NO
2. HAVE YOU ACTUALLY HAD ANY THOUGHTS OF KILLING YOURSELF?: NO
1. IN THE PAST MONTH, HAVE YOU WISHED YOU WERE DEAD OR WISHED YOU COULD GO TO SLEEP AND NOT WAKE UP?: NO

## 2024-01-08 NOTE — BRIEF OP NOTE
Date: 2024  OR Location: [facility] OR    Name: Ko Porter, : 1960, Age: 63 y.o., MRN: [id number], Sex: male    Diagnosis  Pre-op Diagnosis     * Prostate cancer (CMS/HCC) [C61] Post-op Diagnosis     * Prostate cancer (CMS/HCC) [C61]     Procedures  Robot Assisted Prostatectomy, Bilateral pelvic lymph node dissection  98713 - [place] LAPS SURG [id number] SMPL STOT ROBOTIC ASSISTANCE    Robot Assisted Prostatectomy, Bilateral pelvic lymph node dissection  20179 - [place] LAPS SURG [id number] RPBIC RAD W/NRV SPARING ROBOT    KS LAPAROSCOPY URETHRAL SUSPENSION STRESS INCONT [39662]  KS LAPS SURG BILATERAL TOTAL PELVIC LMPHADECTOMY [45153]  KS LAPS SURG [id number] RPBIC RAD W/NRV SPARING ROBOT [16734]  Surgeons      * Russell Soni - Primary    Resident/Fellow/Other Assistant:  Surgeon(s) and Role:     * Je Whitlock MD - Resident - Assisting     * So Huynh MD - Resident - Assisting    Procedure Summary  Anesthesia: General  ASA: III  Anesthesia Staff: Anesthesiologist: Oli Caruso MD  C-AA: CHARLI Narayan; CHARLI Perez; CHARLI Busby  Estimated Blood Loss: 100mL  Intra-op Medications:   Medication Name Total Dose   BUPivacaine HCl (Marcaine) 0.5 % (5 mg/mL) 13 mL, dexAMETHasone (Decadron) 1 mg, lidocaine (Xylocaine) 13 mL, sodium bicarbonate 3 mEq syringe 30 mL   sodium chloride 0.9 % irrigation solution 1,000 mL   ceFAZolin in dextrose (iso-os) (Ancef) IVPB 2 g 4 g              Anesthesia Record               Intraprocedure I/O Totals          Output    Urine 75 mL    Total Output 75 mL          Specimen:   ID Type Source Tests Collected by Time   1 : PERIVESICAL FAT AND PROSTATE Tissue PROSTATE RADICAL PROSTATECTOMY SURGICAL PATHOLOGY EXAM Russell Soni MD 2024 0911   2 : BILATERAL PELVIC LYMPH NODES Tissue PELVIC LYMPH NODE DISSECTION BILATERAL SURGICAL PATHOLOGY EXAM Russell Soni MD 2024 1153        Staff:   Circulator: Geneva Sawyer RN; Morena Sharpe RN  Relief  Circulator: Leland Holloway RN; Javier Corado RN  Relief Scrub: Morena Sharpe RN  Scrub Person: Denita Manzo RN          Findings: prior Urolift implants noted in prostate. Small median lobe. Prostate, seminal vesicles removed en bloc. Bilateral nerve sparing.     Complications:  None; patient tolerated the procedure well.     Disposition: PACU - hemodynamically stable.  Condition: stable  Specimens Collected:   ID Type Source Tests Collected by Time   1 : PERIVESICAL FAT AND PROSTATE Tissue PROSTATE RADICAL PROSTATECTOMY SURGICAL PATHOLOGY EXAM Russell Soni MD 1/8/2024 9395   2 : BILATERAL PELVIC LYMPH NODES Tissue PELVIC LYMPH NODE DISSECTION BILATERAL SURGICAL PATHOLOGY EXAM Russell Soni MD 1/8/2024 1151     Attending Attestation: I was present and scrubbed for the entire procedure.    Russell Soni  Phone Number: [phone]

## 2024-01-08 NOTE — ANESTHESIA POSTPROCEDURE EVALUATION
Patient: Ko Porter    Procedure Summary       Date: 01/08/24 Room / Location: [facility] OR 08 / [facility]    Anesthesia Start: 0734 Anesthesia Stop: 1235    Procedure: Robot Assisted Prostatectomy, Bilateral pelvic lymph node dissection, LYSIS OF ADHESIONS (Prostate) Diagnosis:       Prostate cancer (CMS/HCC)      (Prostate cancer (CMS/HCC) [C61])    Surgeons: Russell Soni MD Responsible Provider: Oli Caruso MD    Anesthesia Type: general ASA Status: 3            Anesthesia Type: general    Vitals Value Taken Time   /60 01/08/24 1546   Temp 36.6 °C (97.9 °F) 01/08/24 1230   Pulse 98 01/08/24 1558   Resp 16 01/08/24 1545   SpO2 95 % 01/08/24 1558       Anesthesia Post Evaluation    Patient location during evaluation: PACU  Patient participation: complete - patient participated  Level of consciousness: awake and alert  Pain management: adequate  Airway patency: patent  Cardiovascular status: acceptable and hemodynamically stable  Respiratory status: acceptable, spontaneous ventilation and nonlabored ventilation  Hydration status: acceptable  Postoperative Nausea and Vomiting: none        There were no known notable events for this encounter.

## 2024-01-08 NOTE — ANESTHESIA PREPROCEDURE EVALUATION
Patient: Ko Porter    Procedure Information       Date/Time: 01/08/24 0730    Procedure: Robot Assisted Prostatectomy    Location: [facility] / [facility]    Surgeons: Russell Soni MD            Relevant Problems   Anesthesia   (+) Difficult intubation (States needed a small tube for appendectomy early 2000s, was aware when he was extubated.)      Cardiovascular   (+) Hyperlipidemia   (+) Hypertension      Endocrine   (+) Obesity   (+) Type 2 diabetes mellitus with morbid obesity (CMS/HCC)      Pulmonary   (+) LEO (obstructive sleep apnea)       Clinical information reviewed:   Tobacco  Allergies  Meds   Med Hx  Surg Hx   Fam Hx  Soc Hx        NPO/Void Status  Carbohydrate Drink Given Prior to Surgery? : N  Date of Last Liquid: 01/07/24  Time of Last Liquid: 1800  Date of Last Solid: 01/07/24  Time of Last Solid: 1800  Last Intake Type: Clear fluids (water)  Time of Last Void: 0600           Past Medical History:   Diagnosis Date    Achilles tendinitis 06/16/2023    Anxiety 06/16/2023    BPH (benign prostatic hyperplasia)     Diabetes mellitus (CMS/HCC) 06/16/2023    A1C= 6.9% on 11/6/23    ED (erectile dysfunction)     GERD (gastroesophageal reflux disease)     Hyperlipidemia     Hypertension     LEO (obstructive sleep apnea)     Prostate CA (CMS/HCC)       Past Surgical History:   Procedure Laterality Date    APPENDECTOMY  01/22/2016    Appendectomy    COLONOSCOPY  10/02/2013    Complete Colonoscopy    OTHER SURGICAL HISTORY Left 10/30/2020    Cataract surgery     Social History     Tobacco Use    Smoking status: Never    Smokeless tobacco: Never   Substance Use Topics    Alcohol use: Not Currently    Drug use: Never      Current Outpatient Medications   Medication Instructions    aspirin 81 mg EC tablet 1 tablet, oral, Daily    atorvastatin (LIPITOR) 10 mg, oral, Daily    empagliflozin (Jardiance) 10 mg 1 tablet, oral, Daily    FreeStyle Reg 2 Sensor kit USE FOR 14 DAYS AND REPLACE.     "insulin aspart (NovoLOG FlexPen U-100 Insulin) 100 unit/mL (3 mL) pen subcutaneous    insulin glargine (Basaglar KwikPen U-100 Insulin) 100 unit/mL (3 mL) pen subcutaneous, Daily RT    lisinopril 20 mg tablet 1 tablet, oral, Daily    metFORMIN (GLUCOPHAGE) 1,000 mg, oral, 2 times daily    multivitamin tablet 1 tablet, oral, Daily    Ozempic 1 mg, subcutaneous, Weekly    semaglutide (Ozempic) 2 mg/dose (8 mg/3 mL) pen injector subcutaneous      No Known Allergies     Chemistry    Lab Results   Component Value Date/Time     01/02/2024 1032    K 4.7 01/02/2024 1032     01/02/2024 1032    CO2 30 01/02/2024 1032    BUN 16 01/02/2024 1032    CREATININE 0.94 01/02/2024 1032    Lab Results   Component Value Date/Time    CALCIUM 9.4 01/02/2024 1032    ALKPHOS 83 01/02/2024 1032    AST 10 01/02/2024 1032    ALT 10 01/02/2024 1032    BILITOT 0.6 01/02/2024 1032          Lab Results   Component Value Date/Time    WBC 7.6 01/02/2024 1032    HGB 14.0 01/02/2024 1032    HCT 47.4 01/02/2024 1032     01/02/2024 1032     No results found for: \"PROTIME\", \"PTT\", \"INR\"  Encounter Date: 12/14/23   ECG 12 lead   Result Value    Ventricular Rate 87    Atrial Rate 87    IA Interval 144    QRS Duration 84    QT Interval 354    QTC Calculation(Bazett) 425    P Axis 60    R Axis 68    T Axis 60    QRS Count 15    Q Onset 219    P Onset 147    P Offset 203    T Offset 396    QTC Fredericia 400    Narrative    Normal sinus rhythm  Normal ECG  When compared with ECG of 21-JUL-2023 09:26,  No significant change was found  Confirmed by Pete Walker ([id number]) on 12/16/2023 8:12:08 AM     No results found for this or any previous visit from the past 1095 days.   Echo 8/14/2023:  Left Ventricle: The left ventricular systolic function is normal, with an estimated ejection fraction of 55-60%. There are no regional wall motion abnormalities. The left ventricular cavity size is normal. The left ventricular septal wall thickness is mildly " "increased. Spectral Doppler shows a normal pattern of left ventricular diastolic filling.  Left Atrium: The left atrium is normal in size.  Right Ventricle: The right ventricle is normal in size. There is normal right ventricular global systolic function.  Right Atrium: The right atrium is normal in size.  Aortic Valve: The aortic valve is trileaflet. There is no evidence of aortic valve regurgitation. The peak instantaneous gradient of the aortic valve is 6.7 mmHg.  Mitral Valve: The mitral valve is normal in structure. There is no evidence of mitral valve regurgitation.  Tricuspid Valve: The tricuspid valve is structurally normal. No evidence of tricuspid regurgitation. The right ventricular systolic pressure is unable to be estimated.  Pulmonic Valve: The pulmonic valve is structurally normal. There is no indication of pulmonic valve regurgitation.  Pericardium: There is no pericardial effusion noted.  Aorta: The aortic root is normal.  In comparison to the previous echocardiogram(s): There are no prior studies on this patient for comparison purposes.        CONCLUSIONS:  1. Left ventricular systolic function is normal with a 55-60% estimated ejection fraction.  2. There is normal right ventricular global systolic function.  3. The right ventricle is normal in size.  4. The right ventricular systolic pressure is unable to be estimated.    Visit Vitals  /68   Pulse 74   Temp 36.6 °C (97.9 °F) (Temporal)   Resp 16   Ht 1.626 m (5' 4\")   Wt 77.6 kg (171 lb 1.2 oz)   SpO2 98%   BMI 29.37 kg/m²   Smoking Status Never   BSA 1.87 m²        Physical Exam    Airway  Mallampati: III  TM distance: >3 FB  Neck ROM: full     Cardiovascular   Rhythm: regular  Rate: normal     Dental - normal exam     Pulmonary   Breath sounds clear to auscultation     Abdominal - normal exam              Anesthesia Plan    ASA 3     general   (General with ETT, glidescope/dumont)  intravenous induction   Postoperative administration of " opioids is intended.  Trial extubation is planned.  Anesthetic plan and risks discussed with patient.    Plan discussed with CAA and CRNA.

## 2024-01-08 NOTE — SIGNIFICANT EVENT
Pt received from OR    Placed on continuous cardiac / saO2 monitor.  Oral airway maintained with simple mask.

## 2024-01-08 NOTE — OP NOTE
Robot Assisted Prostatectomy, Bilateral pelvic lymph node dissection Operative Note     Date: 2024  OR Location: [facility] OR    Name: Ko Porter, : 1960, Age: 63 y.o., MRN: [id number], Sex: male    Diagnosis  Pre-op Diagnosis     * Prostate cancer (CMS/HCC) [C61] Post-op Diagnosis     * Prostate cancer (CMS/HCC) [C61]     Procedures  Robot Assisted Prostatectomy, Bilateral pelvic lymph node dissection  30774 - SC LAPS SURG [id number] SMPL STOT ROBOTIC ASSISTANCE    Robot Assisted Prostatectomy, Bilateral pelvic lymph node dissection  81129 - SC LAPS SURG [id number] RPBIC RAD W/NRV SPARING ROBOT    SC LAPAROSCOPY URETHRAL SUSPENSION STRESS INCONT [47877]  SC LAPS SURG BILATERAL TOTAL PELVIC LMPHADECTOMY [82817]  SC LAPS SURG [id number] RPBIC RAD W/NRV SPARING ROBOT [06810]  Surgeons      * Russell Soni - Primary    Resident/Fellow/Other Assistant:  Surgeon(s) and Role:     * Je Whitlock MD - Resident - Assisting     * So Huynh MD - Resident - Assisting    Procedure Summary  Anesthesia: General  ASA: III  Anesthesia Staff: Anesthesiologist: Oli Caruso MD  C-AA: CHARLI Narayan; CHARLI Perez; CHARLI Busby  Estimated Blood Loss: 100mL  Intra-op Medications:   Medication Name Total Dose   BUPivacaine HCl (Marcaine) 0.5 % (5 mg/mL) 13 mL, dexAMETHasone (Decadron) 1 mg, lidocaine (Xylocaine) 13 mL, sodium bicarbonate 3 mEq syringe 30 mL   sodium chloride 0.9 % irrigation solution 1,000 mL   ceFAZolin in dextrose (iso-os) (Ancef) IVPB 2 g 4 g              Anesthesia Record               Intraprocedure I/O Totals          Intake    LR infusion 1300.00 mL    Total Intake 1300 mL       Output    Urine 75 mL    Total Output 75 mL       Net    Net Volume 1225 mL          Specimen:   ID Type Source Tests Collected by Time   1 : PERIVESICAL FAT AND PROSTATE Tissue PROSTATE RADICAL PROSTATECTOMY SURGICAL PATHOLOGY EXAM Russell Soni MD 2024 0911   2 : BILATERAL PELVIC LYMPH NODES  Tissue PELVIC LYMPH NODE DISSECTION BILATERAL SURGICAL PATHOLOGY EXAM Russell Soni MD 1/8/2024 5245        Staff:   Circulator: Geneva Sawyer RN; Morena Sharpe RN  Relief Circulator: Leland Holloway RN; Javier Corado RN  Relief Scrub: Morena Sharpe RN  Scrub Person: Denita Manzo RN         Drains and/or Catheters:   Urethral Catheter Latex 20 Fr. (Active)       Findings: prior Urolift implants noted in prostate. Small median lobe. Prostate, seminal vesicles removed en bloc. Bilateral nerve sparing.      Indications: Ko Porter is an 63 y.o. male who is having surgery for Prostate cancer (CMS/[facility]) [C61].     The patient was seen in the preoperative area. The risks, benefits, complications, treatment options, non-operative alternatives, expected recovery and outcomes were discussed with the patient. The possibilities of reaction to medication, pulmonary aspiration, injury to surrounding structures, bleeding, recurrent infection, the need for additional procedures, failure to diagnose a condition, and creating a complication requiring transfusion or operation were discussed with the patient. The patient concurred with the proposed plan, giving informed consent.  The site of surgery was properly noted/marked if necessary per policy. The patient has been actively warmed in preoperative area. Preoperative antibiotics have been ordered and given within 1 hours of incision. Venous thrombosis prophylaxis have been ordered including bilateral sequential compression devices and chemical prophylaxis    Procedure Details: The patient was taken to the operating room and positioned in supine position. General anesthesia was induced smoothly. The patient was then shaved, prepped, and draped in usual sterile fashion, secured to the bed with the Pink Pad as well as chest strap.  A midline incision was made above the umbilicus with Bovie cautery, and intraperitoneal access was obtained using a Veress needle using  the drop test to confirm placement.  The abdomen was insufflated with CO2 to a pressure of 15.  We placed an 8 robot port in the midline and inspected his abdomen which revealed no abnormalities. One 8mm robot port was placed on the right side of the abdomen, an additional two 8mm robot ports placed in the left with a 12 mm AirSeal port placed as an assistant port on the right side. A emery aly was used to place an 0-vicryl suture around the 12mm air seal port, which was tagged for closure at the conclusion of the case.  The patient was placed in steep Trendelenburg position, and the robot was docked.       We identified the vas deferens bilaterally to start our posterior dissection. We started the posterior dissection by incising the peritoneum behind the prostate.  The bilateral vas were identified, dissected out and divided with cautery.  The seminal vesicles were then dissected out in their entirety using sharp dissection.  We entered Denonvilliers fascia sharply and developed the space between the rectum and the prostate bluntly toward the apex of the prostate.      The bladder was then dropped in the usual fashion. The bladder neck was then divided with cautery on the anterior portion, and then the posterior bladder neck was entered, was divided until the posterior plane previously dissected was identified.  The vas and seminal vesicles were then pulled anteriorly, and a bilateral nerve-sparing procedure was then performed with the neurovascular bundles dropped laterally.  The pedicles were then taken with Hem-o-brian clips with minimal use of cautery throughout. The neurovascular bundle was well defined. The urethra was divided with scissors and the apex of the prostate dissected free with the prostate specimen then placed in a specimen bag. The dorsal venous complex was then suture ligated with a 3-0 V-Loc suture.       The lymph node dissection was then performed at this point with all fatty lymphatic  tissue taken that was medial to the external iliac vein down to the obturator nerve which was spared.       A Bobo stitch was performed using a running 2-0 V-loc. The vesicourethral anastomosis was then performed with two running 3-0 monocryl sutures. A 20-Sami Coronado catheter was placed after the anastomosis was completed and balloon filled with 15 mL of sterile water.  A leak test was performed with 60 ml of normal saline, and the anastomosis appeared watertight, therefore the decision was made to not leave a drain. The robot was then undocked and the ports removed.       The specimen was removed through a midline incision which was extended superiorly with the Bovie. The fascia at this site was then closed with figure eight 0-Vicryl sutures. The assistant port site with previously placed 0-vicryl was tied down and fascia was well approximated.  The skin incisions were then closed with running subcuticular 4-0 Monocryl, and Dermabond was applied.  The patient was then awoken from anesthesia and taken to PACU in good condition.    Complications:  None; patient tolerated the procedure well.    Disposition: PACU - hemodynamically stable.  Condition: stable         Additional Details: n/a    Attending Attestation: I was present and scrubbed for the entire procedure.    Russell Soni  Phone Number: [phone]

## 2024-01-08 NOTE — ANESTHESIA PROCEDURE NOTES
Airway  Date/Time: 1/8/2024 7:44 AM  Urgency: elective    Airway not difficult    Staffing  Performed: AVRIL   Authorized by: Oli Caruso MD    Performed by: CHARLI Perez  Patient location during procedure: OR    Indications and Patient Condition  Indications for airway management: anesthesia  Spontaneous Ventilation: absent  Sedation level: deep  Preoxygenated: yes  Patient position: sniffing  MILS not maintained throughout  Mask difficulty assessment: 1 - vent by mask  No planned trial extubation    Final Airway Details  Final airway type: endotracheal airway      Successful airway: ETT  Cuffed: yes   Successful intubation technique: video laryngoscopy  Facilitating devices/methods: intubating stylet  Endotracheal tube insertion site: oral  Blade size: #4  ETT size (mm): 8.0  Cormack-Lehane Classification: grade IIa - partial view of glottis  Placement verified by: chest auscultation   Inital cuff pressure (cm H2O): 21  Cuff volume (mL): 8  Measured from: lips  ETT to lips (cm): 22  Number of attempts at approach: 1  Number of other approaches attempted: 0

## 2024-01-09 VITALS
OXYGEN SATURATION: 98 % | BODY MASS INDEX: 29.37 KG/M2 | HEART RATE: 97 BPM | WEIGHT: 172 LBS | DIASTOLIC BLOOD PRESSURE: 65 MMHG | RESPIRATION RATE: 16 BRPM | SYSTOLIC BLOOD PRESSURE: 121 MMHG | HEIGHT: 64 IN | TEMPERATURE: 97.9 F

## 2024-01-09 LAB
ANION GAP SERPL CALC-SCNC: 13 MMOL/L (ref 10–20)
BUN SERPL-MCNC: 15 MG/DL (ref 6–23)
CALCIUM SERPL-MCNC: 8.2 MG/DL (ref 8.6–10.3)
CHLORIDE SERPL-SCNC: 102 MMOL/L (ref 98–107)
CO2 SERPL-SCNC: 27 MMOL/L (ref 21–32)
CREAT SERPL-MCNC: 0.84 MG/DL (ref 0.5–1.3)
EGFRCR SERPLBLD CKD-EPI 2021: >90 ML/MIN/1.73M*2
ERYTHROCYTE [DISTWIDTH] IN BLOOD BY AUTOMATED COUNT: 14.1 % (ref 11.5–14.5)
GLUCOSE BLD MANUAL STRIP-MCNC: 180 MG/DL (ref 74–99)
GLUCOSE BLD MANUAL STRIP-MCNC: 220 MG/DL (ref 74–99)
GLUCOSE SERPL-MCNC: 170 MG/DL (ref 74–99)
HCT VFR BLD AUTO: 43.4 % (ref 41–52)
HGB BLD-MCNC: 13.4 G/DL (ref 13.5–17.5)
HIV 1+2 AB+HIV1P24 AG SERPLBLD IA.RAPID: NONREACTIVE
MCH RBC QN AUTO: 27.1 PG (ref 26–34)
MCHC RBC AUTO-ENTMCNC: 30.9 G/DL (ref 32–36)
MCV RBC AUTO: 88 FL (ref 80–100)
NRBC BLD-RTO: 0.6 /100 WBCS (ref 0–0)
PLATELET # BLD AUTO: 218 X10*3/UL (ref 150–450)
POTASSIUM SERPL-SCNC: 4.3 MMOL/L (ref 3.5–5.3)
RBC # BLD AUTO: 4.95 X10*6/UL (ref 4.5–5.9)
SODIUM SERPL-SCNC: 138 MMOL/L (ref 136–145)
WBC # BLD AUTO: 10.4 X10*3/UL (ref 4.4–11.3)

## 2024-01-09 PROCEDURE — 86703 HIV-1/HIV-2 1 RESULT ANTBDY: CPT

## 2024-01-09 PROCEDURE — 82947 ASSAY GLUCOSE BLOOD QUANT: CPT | Mod: 59

## 2024-01-09 PROCEDURE — S2900 ROBOTIC SURGICAL SYSTEM: HCPCS | Performed by: STUDENT IN AN ORGANIZED HEALTH CARE EDUCATION/TRAINING PROGRAM

## 2024-01-09 PROCEDURE — 85027 COMPLETE CBC AUTOMATED: CPT | Performed by: STUDENT IN AN ORGANIZED HEALTH CARE EDUCATION/TRAINING PROGRAM

## 2024-01-09 PROCEDURE — 38562 REMOVAL PELVIC LYMPH NODES: CPT | Performed by: STUDENT IN AN ORGANIZED HEALTH CARE EDUCATION/TRAINING PROGRAM

## 2024-01-09 PROCEDURE — 86803 HEPATITIS C AB TEST: CPT | Mod: AHULAB

## 2024-01-09 PROCEDURE — 87340 HEPATITIS B SURFACE AG IA: CPT | Mod: AHULAB

## 2024-01-09 PROCEDURE — 2500000004 HC RX 250 GENERAL PHARMACY W/ HCPCS (ALT 636 FOR OP/ED): Performed by: STUDENT IN AN ORGANIZED HEALTH CARE EDUCATION/TRAINING PROGRAM

## 2024-01-09 PROCEDURE — 80048 BASIC METABOLIC PNL TOTAL CA: CPT | Performed by: STUDENT IN AN ORGANIZED HEALTH CARE EDUCATION/TRAINING PROGRAM

## 2024-01-09 PROCEDURE — 2500000002 HC RX 250 W HCPCS SELF ADMINISTERED DRUGS (ALT 637 FOR MEDICARE OP, ALT 636 FOR OP/ED): Performed by: STUDENT IN AN ORGANIZED HEALTH CARE EDUCATION/TRAINING PROGRAM

## 2024-01-09 PROCEDURE — 9420000001 HC RT PATIENT EDUCATION 5 MIN

## 2024-01-09 PROCEDURE — 36415 COLL VENOUS BLD VENIPUNCTURE: CPT | Performed by: STUDENT IN AN ORGANIZED HEALTH CARE EDUCATION/TRAINING PROGRAM

## 2024-01-09 PROCEDURE — 2500000005 HC RX 250 GENERAL PHARMACY W/O HCPCS: Performed by: STUDENT IN AN ORGANIZED HEALTH CARE EDUCATION/TRAINING PROGRAM

## 2024-01-09 PROCEDURE — 99231 SBSQ HOSP IP/OBS SF/LOW 25: CPT

## 2024-01-09 PROCEDURE — 55866 LAPS SURG PRST8ECT RPBIC RAD: CPT | Performed by: STUDENT IN AN ORGANIZED HEALTH CARE EDUCATION/TRAINING PROGRAM

## 2024-01-09 PROCEDURE — 7100000011 HC EXTENDED STAY RECOVERY HOURLY - NURSING UNIT

## 2024-01-09 PROCEDURE — 2500000001 HC RX 250 WO HCPCS SELF ADMINISTERED DRUGS (ALT 637 FOR MEDICARE OP): Performed by: STUDENT IN AN ORGANIZED HEALTH CARE EDUCATION/TRAINING PROGRAM

## 2024-01-09 PROCEDURE — 36415 COLL VENOUS BLD VENIPUNCTURE: CPT

## 2024-01-09 RX ADMIN — ACETAMINOPHEN 975 MG: 325 TABLET ORAL at 05:28

## 2024-01-09 RX ADMIN — INSULIN LISPRO 1 UNITS: 100 INJECTION, SOLUTION INTRAVENOUS; SUBCUTANEOUS at 08:20

## 2024-01-09 RX ADMIN — POLYETHYLENE GLYCOL 3350 17 G: 17 POWDER, FOR SOLUTION ORAL at 08:20

## 2024-01-09 RX ADMIN — SENNOSIDES 17.2 MG: 8.6 TABLET, FILM COATED ORAL at 08:21

## 2024-01-09 RX ADMIN — LIDOCAINE 1 PATCH: 4 PATCH TOPICAL at 08:22

## 2024-01-09 RX ADMIN — INSULIN LISPRO 2 UNITS: 100 INJECTION, SOLUTION INTRAVENOUS; SUBCUTANEOUS at 13:08

## 2024-01-09 RX ADMIN — ATORVASTATIN CALCIUM 10 MG: 10 TABLET, FILM COATED ORAL at 08:21

## 2024-01-09 RX ADMIN — METHOCARBAMOL TABLETS 500 MG: 500 TABLET, COATED ORAL at 02:57

## 2024-01-09 RX ADMIN — SODIUM CHLORIDE, POTASSIUM CHLORIDE, SODIUM LACTATE AND CALCIUM CHLORIDE 125 ML/HR: 600; 310; 30; 20 INJECTION, SOLUTION INTRAVENOUS at 10:18

## 2024-01-09 RX ADMIN — METHOCARBAMOL TABLETS 500 MG: 500 TABLET, COATED ORAL at 10:17

## 2024-01-09 RX ADMIN — GABAPENTIN 600 MG: 300 CAPSULE ORAL at 13:08

## 2024-01-09 RX ADMIN — HEPARIN SODIUM 5000 UNITS: 5000 INJECTION INTRAVENOUS; SUBCUTANEOUS at 06:27

## 2024-01-09 RX ADMIN — SODIUM CHLORIDE, POTASSIUM CHLORIDE, SODIUM LACTATE AND CALCIUM CHLORIDE 125 ML/HR: 600; 310; 30; 20 INJECTION, SOLUTION INTRAVENOUS at 02:57

## 2024-01-09 RX ADMIN — ASPIRIN 81 MG: 81 TABLET, COATED ORAL at 08:21

## 2024-01-09 RX ADMIN — GABAPENTIN 600 MG: 300 CAPSULE ORAL at 05:28

## 2024-01-09 RX ADMIN — ACETAMINOPHEN 975 MG: 325 TABLET ORAL at 10:17

## 2024-01-09 ASSESSMENT — PAIN SCALES - GENERAL: PAINLEVEL_OUTOF10: 0 - NO PAIN

## 2024-01-09 NOTE — PROGRESS NOTES
"Ko Porter is a 63 y.o. male on day 0 of admission presenting with Prostate cancer (CMS/HCC), patient now POD#0 s/p robot assisted prostatectomy, bilateral lymph node dissection. Post-operative findings significant for prior Urolift implants noted in prostate. Small median lobe. Prostate, seminal vesicles removed en bloc. Bilateral nerve sparing.       Subjective   Patient doing well this evening, sore but pain is well controlled. Ate dinner but appetite minimal, denies N/V. Has urinary catheter in place with dark pain urine in the tubing.        Objective   PE:  Constitutional: A&Ox3, calm and cooperative  Eyes: clear sclera   ENMT: Moist mucous membranes  Head/Neck: Neck supple  Cardiovascular: Normal rate and regular rhythm.   Respiratory/Thorax: Mild wheezing noted, Good symmetric chest expansion.  Gastrointestinal: Abdomen slightly distended, soft, appropriately tender, no peritoneal signs, laparotomy sites c/d/i, well approximate with Dermabond, no erythema or drainage    Genitourinary:  Coronado catheter in place with dark pink urine   Musculoskeletal: ROM intact  Extremities: No peripheral edema  Neurological: A&Ox3  Psychological: Appropriate mood and behavior  Skin: Warm and dry      Last Recorded Vitals  Blood pressure 143/71, pulse 80, temperature 37.2 °C (98.9 °F), temperature source Temporal, resp. rate 16, height 1.626 m (5' 4\"), weight 77.6 kg (171 lb 1.2 oz), SpO2 96 %.  Intake/Output last 3 Shifts:  I/O last 3 completed shifts:  In: 1600 (20.6 mL/kg) [I.V.:1600 (20.6 mL/kg)]  Out: 875 (11.3 mL/kg) [Urine:875 (0.3 mL/kg/hr)]  Weight: 77.6 kg       Assessment/Plan   Principal Problem:    Prostate cancer (CMS/HCC)  Active Problems:    Difficult intubation    Malignant neoplasm of prostate (CMS/HCC)    Ko Porter is a 63 y.o. male on day 0 of admission presenting with Prostate cancer (CMS/HCC), patient now POD#0 s/p robot assisted prostatectomy, bilateral lymph node dissection. Post-operative " findings significant for prior Urolift implants noted in prostate. Small median lobe. Prostate, seminal vesicles removed en bloc. Bilateral nerve sparing.       Post op check  - Maintain castillo catheter, will be discharged with castillo  - continue PRN pain medications    - minimize narcotics   - continue PRN antiemetics  - bowel regimen- miralax, senna  - IVF  - Diabetic/regular diet    - insulin sliding scale, ACHS   - repeat lab work in the AM  - taught and encouraged IS  - DVT proph: OOB as much as possible, ambulate, Heparin       I spent 25 minutes in the professional and overall care of this patient.      Sonal Robison, APRN-CNP

## 2024-01-09 NOTE — NURSING NOTE

## 2024-01-09 NOTE — CARE PLAN
The patient's goals for the shift include      The clinical goals for the shift include pt lea remain injury free during this shift      Problem: Fall/Injury  Goal: Not fall by end of shift  Outcome: Met  Goal: Be free from injury by end of the shift  Outcome: Met     Problem: Skin  Goal: Promote skin healing  Outcome: Met     Problem: Pain  Goal: Performs ADL's with improved pain control throughout shift  Outcome: Met

## 2024-01-09 NOTE — DISCHARGE SUMMARY
Discharge Diagnosis  Prostate cancer (CMS/HCC)    Issues Requiring Follow-Up  Regular post-op visit  Coronado removal and TOV    Test Results Pending At Discharge  Pending Labs       Order Current Status    Surgical Pathology Exam In process            Hospital Course  63 yr old Male s/p Robot Assisted Prostatectomy, Bilateral pelvic lymph node dissection on 1/8 with Dr. Soni. Please see operative report for full details. Patient tolerated the procedure well and recovered briefly in PACU before being transitioned to regular nursing floor. Post-op course was uncomplicated. Diet was advanced as tolerated.  IV medication transitioned to oral as diet advanced. On the day of discharge, the pt was tolerating a diet, pain was controlled on PO pain medication, and they were ambulating and voiding spontaneously. They were discharged to home in stable condition with instructions to follow up as outpatient.      Pertinent Physical Exam At Time of Discharge  Physical Exam  Constitutional:       Appearance: Normal appearance.   Cardiovascular:      Rate and Rhythm: Normal rate and regular rhythm.   Pulmonary:      Effort: Pulmonary effort is normal.      Comments: Non labored breathing on RA  Abdominal:      General: There is distension.      Palpations: Abdomen is soft.      Tenderness: There is no abdominal tenderness.      Comments: Lap sites C/D/I, edges well approximated, covered in Dermabond.   Genitourinary:     Comments: Coronado in place light red urine, without clots.  Skin:     General: Skin is warm and dry.   Neurological:      General: No focal deficit present.      Mental Status: He is alert and oriented to person, place, and time. Mental status is at baseline.   Psychiatric:         Attention and Perception: Attention normal.         Mood and Affect: Mood normal.         Speech: Speech normal.         Behavior: Behavior normal.         Cognition and Memory: Cognition normal.     /84   Pulse 77   Temp 36.6 °C  "(97.9 °F) (Temporal)   Resp 16   Ht 1.626 m (5' 4\")   Wt 78 kg (172 lb)   SpO2 93%   BMI 29.52 kg/m²      Home Medications     Medication List      START taking these medications     acetaminophen 325 mg tablet; Commonly known as: Tylenol; Take 2 tablets   (650 mg) by mouth every 6 hours if needed for mild pain (1 - 3).   ketorolac 10 mg tablet; Commonly known as: Toradol; Take 1 tablet (10   mg) by mouth every 6 hours if needed for moderate pain (4 - 6).   methocarbamol 500 mg tablet; Commonly known as: Robaxin; Take 1 tablet   (500 mg) by mouth 4 times a day for 7 days.   polyethylene glycol 17 gram packet; Commonly known as: Glycolax,   Miralax; Take 17 g by mouth once daily for 7 days.   tadalafil 5 mg tablet; Commonly known as: Cialis; Take 1 tablet (5 mg)   by mouth once daily.     CONTINUE taking these medications     aspirin 81 mg EC tablet   atorvastatin 10 mg tablet; Commonly known as: Lipitor; Take 1 tablet (10   mg) by mouth once daily.   Basaglar KwikPen U-100 Insulin 100 unit/mL (3 mL) pen; Generic drug:   insulin glargine   FreeStyle Reg 2 Sensor kit; Generic drug: FreeStyle Reg sensor   system   Jardiance 10 mg; Generic drug: empagliflozin   lisinopril 20 mg tablet   metFORMIN 1,000 mg tablet; Commonly known as: Glucophage; TAKE 1 TABLET   TWICE A DAY   multivitamin tablet   NovoLOG Flexpen U-100 Insulin 100 unit/mL (3 mL) pen; Generic drug:   insulin aspart   * Ozempic 2 mg/dose (8 mg/3 mL) pen injector; Generic drug: semaglutide   * Ozempic 1 mg/dose (4 mg/3 mL) pen injector; Generic drug: semaglutide;   INJECT 1 MG SUBCUTANEOUSLY WEEKLY  * This list has 2 medication(s) that are the same as other medications   prescribed for you. Read the directions carefully, and ask your doctor or   other care provider to review them with you.       Outpatient Follow-Up  Future Appointments   Date Time Provider Department Center   1/11/2024 10:00 AM Julio Cesar Helm, APRN-CNP [id number] [facility] "   1/29/2024 10:40 AM Russell Soni MD [id number] [place]   2/5/2024 11:00 AM Enmanuel Lui MD [id number] [place]   3/5/2024  9:45 AM Deloris Freeman MD [id number] [place]   3/27/2024  1:00 PM SLEEP LAB [facility] [id number] HOME STUDY [id number] [place]   6/21/2024  9:00 AM EFRA Saha-CNP [id number] [facility]   7/26/2024  9:00 AM Kee Dorantes MD [id number] [facility]       Helio Cobian PA-C

## 2024-01-09 NOTE — CARE PLAN
Problem: Fall/Injury  Goal: Not fall by end of shift  Outcome: Progressing  Goal: Be free from injury by end of the shift  Outcome: Progressing     Problem: Skin  Goal: Promote skin healing  Outcome: Progressing  Flowsheets (Taken 1/8/2024 2202)  Promote skin healing: Assess skin/pad under line(s)/device(s)     Problem: Pain  Goal: Performs ADL's with improved pain control throughout shift  Outcome: Progressing

## 2024-01-10 ENCOUNTER — PATIENT OUTREACH (OUTPATIENT)
Dept: CARE COORDINATION | Facility: CLINIC | Age: 64
End: 2024-01-10
Payer: COMMERCIAL

## 2024-01-10 ENCOUNTER — DOCUMENTATION (OUTPATIENT)
Dept: PRIMARY CARE | Facility: CLINIC | Age: 64
End: 2024-01-10
Payer: COMMERCIAL

## 2024-01-10 LAB
HBV SURFACE AG SERPL QL IA: NONREACTIVE
HCV AB SER QL: NONREACTIVE

## 2024-01-10 NOTE — PROGRESS NOTES
Discharge Facility:[facility]  Discharge Diagnosis:Prostate cancer S/P robot assist Prostatectomy  Admission Date:01/08/24  Discharge Date: 01/09/24    PCP Appointment Date:none available sent to pcp office  Specialist Appointment Date: 01/29/24  Hospital Encounter and Summary: not available at this time   See discharge assessment below for further details  Engagement  Call Start Time: 0830 (1/10/2024  8:30 AM)    Medications  Medications reviewed with patient/caregiver?: Yes (cialis 5mg robaxin 500mg ketorolac 10mg) (1/10/2024  8:30 AM)  Is the patient having any side effects they believe may be caused by any medication additions or changes?: No (1/10/2024  8:30 AM)  Does the patient have all medications ordered at discharge?: Yes (1/10/2024  8:30 AM)  Is the patient taking all medications as directed (includes completed medication regime)?: Yes (1/10/2024  8:30 AM)    Appointments  Does the patient have a primary care provider?: Yes (1/10/2024  8:30 AM)  Care Management Interventions: Advised patient to make appointment (1/10/2024  8:30 AM)    Self Management  Has home health visited the patient within 72 hours of discharge?: Not applicable (1/10/2024  8:30 AM)  Has all Durable Medical Equipment (DME) been delivered?: No (1/10/2024  8:30 AM)    Patient Teaching  Does the patient have access to their discharge instructions?: Yes (1/10/2024  8:30 AM)  Care Management Interventions: Reviewed instructions with patient (1/10/2024  8:30 AM)  What is the patient's perception of their health status since discharge?: Improving (1/10/2024  8:30 AM)    Wrap Up  Call End Time: 0840 (1/10/2024  8:30 AM)

## 2024-01-11 ENCOUNTER — OFFICE VISIT (OUTPATIENT)
Dept: UROLOGY | Facility: CLINIC | Age: 64
End: 2024-01-11
Payer: COMMERCIAL

## 2024-01-11 ENCOUNTER — APPOINTMENT (OUTPATIENT)
Dept: PRIMARY CARE | Facility: CLINIC | Age: 64
End: 2024-01-11
Payer: COMMERCIAL

## 2024-01-11 ENCOUNTER — APPOINTMENT (OUTPATIENT)
Dept: UROLOGY | Facility: CLINIC | Age: 64
End: 2024-01-11
Payer: COMMERCIAL

## 2024-01-11 VITALS
HEART RATE: 116 BPM | DIASTOLIC BLOOD PRESSURE: 69 MMHG | TEMPERATURE: 96.8 F | BODY MASS INDEX: 27.99 KG/M2 | HEIGHT: 65 IN | RESPIRATION RATE: 20 BRPM | WEIGHT: 168 LBS | SYSTOLIC BLOOD PRESSURE: 107 MMHG

## 2024-01-11 DIAGNOSIS — C61 PROSTATE CANCER (MULTI): Primary | ICD-10-CM

## 2024-01-11 PROCEDURE — 4010F ACE/ARB THERAPY RXD/TAKEN: CPT | Performed by: NURSE PRACTITIONER

## 2024-01-11 PROCEDURE — 3051F HG A1C>EQUAL 7.0%<8.0%: CPT | Performed by: NURSE PRACTITIONER

## 2024-01-11 PROCEDURE — 3074F SYST BP LT 130 MM HG: CPT | Performed by: NURSE PRACTITIONER

## 2024-01-11 PROCEDURE — 99024 POSTOP FOLLOW-UP VISIT: CPT | Performed by: NURSE PRACTITIONER

## 2024-01-11 PROCEDURE — 3078F DIAST BP <80 MM HG: CPT | Performed by: NURSE PRACTITIONER

## 2024-01-11 PROCEDURE — 51700 IRRIGATION OF BLADDER: CPT | Performed by: NURSE PRACTITIONER

## 2024-01-11 ASSESSMENT — PAIN SCALES - GENERAL: PAINLEVEL: 0-NO PAIN

## 2024-01-11 NOTE — PROGRESS NOTES
UROLOGIC FOLLOW-UP VISIT     PROBLEM LIST:  1. Prostate cancer (CMS/HCC)             HISTORY OF PRESENT ILLNESS:   Ko Porter is a 63 y.o. with prostate cancer s/p RALP with Dr. Soni 1/8/24    INTERVAL HISTORY:  Returns for POV, TOV  Seen with spouse  Reports doing well  Brownish red urine in drainage bag  No clots    PAST MEDICAL HISTORY:  Past Medical History:   Diagnosis Date    Achilles tendinitis 06/16/2023    Anxiety 06/16/2023    BPH (benign prostatic hyperplasia)     Diabetes mellitus (CMS/HCC) 06/16/2023    A1C= 6.9% on 11/6/23    ED (erectile dysfunction)     GERD (gastroesophageal reflux disease)     Hyperlipidemia     Hypertension     LEO (obstructive sleep apnea)     Prostate CA (CMS/HCC)        PAST SURGICAL HISTORY:  Past Surgical History:   Procedure Laterality Date    APPENDECTOMY  01/22/2016    Appendectomy    COLONOSCOPY  10/02/2013    Complete Colonoscopy    OTHER SURGICAL HISTORY Left 10/30/2020    Cataract surgery        ALLERGIES:   No Known Allergies     MEDICATIONS:   Current Outpatient Medications on File Prior to Visit   Medication Sig Dispense Refill    acetaminophen (Tylenol) 325 mg tablet Take 2 tablets (650 mg) by mouth every 6 hours if needed for mild pain (1 - 3). 30 tablet 0    aspirin 81 mg EC tablet Take 1 tablet (81 mg) by mouth once daily.      atorvastatin (Lipitor) 10 mg tablet Take 1 tablet (10 mg) by mouth once daily. 90 tablet 1    empagliflozin (Jardiance) 10 mg Take 1 tablet (10 mg) by mouth once daily.      FreeStyle Reg 2 Sensor kit USE FOR 14 DAYS AND REPLACE.      insulin aspart (NovoLOG FlexPen U-100 Insulin) 100 unit/mL (3 mL) pen Inject under the skin.      insulin glargine (Basaglar KwikPen U-100 Insulin) 100 unit/mL (3 mL) pen Inject under the skin once daily.      ketorolac (Toradol) 10 mg tablet Take 1 tablet (10 mg) by mouth every 6 hours if needed for moderate pain (4 - 6). 20 tablet 0    lisinopril 20 mg tablet Take 1 tablet (20 mg) by mouth once  daily.      metFORMIN (Glucophage) 1,000 mg tablet TAKE 1 TABLET TWICE A  tablet 3    methocarbamol (Robaxin) 500 mg tablet Take 1 tablet (500 mg) by mouth 4 times a day for 7 days. 28 tablet 0    multivitamin tablet Take 1 tablet by mouth once daily.      polyethylene glycol (Glycolax, Miralax) 17 gram packet Take 17 g by mouth once daily for 7 days. 7 packet 0    semaglutide (Ozempic) 1 mg/dose (4 mg/3 mL) pen injector INJECT 1 MG SUBCUTANEOUSLY WEEKLY 9 mL 3    semaglutide (Ozempic) 2 mg/dose (8 mg/3 mL) pen injector Inject under the skin.      tadalafil (Cialis) 5 mg tablet Take 1 tablet (5 mg) by mouth once daily. 90 tablet 1    [DISCONTINUED] sildenafil (Viagra) 50 mg tablet Take by mouth.      [DISCONTINUED] tadalafil (Cialis) 5 mg tablet Take 1 tablet (5 mg) by mouth once daily. (Patient not taking: Reported on 1/2/2024) 30 tablet 3    [DISCONTINUED] tamsulosin (Flomax) 0.4 mg 24 hr capsule Take 2 capsules (0.8 mg) by mouth once daily. 180 capsule 3     No current facility-administered medications on file prior to visit.        SOCIAL HISTORY:  Patient  reports that he has never smoked. He has never used smokeless tobacco. He reports that he does not currently use alcohol. He reports that he does not use drugs.   Social History     Socioeconomic History    Marital status:      Spouse name: Not on file    Number of children: Not on file    Years of education: Not on file    Highest education level: Not on file   Occupational History    Not on file   Tobacco Use    Smoking status: Never    Smokeless tobacco: Never   Substance and Sexual Activity    Alcohol use: Not Currently    Drug use: Never    Sexual activity: Not on file   Other Topics Concern    Not on file   Social History Narrative    Not on file     Social Determinants of Health     Financial Resource Strain: Not on file   Food Insecurity: Not on file   Transportation Needs: Not on file   Physical Activity: Not on file   Stress: Not on  file   Social Connections: Not on file   Intimate Partner Violence: Not on file   Housing Stability: Not on file       FAMILY HISTORY:  Family History   Problem Relation Name Age of Onset    Diabetes Father      Cancer Maternal Grandfather         REVIEW OF SYSTEMS:  All systems reviewed, pertinent negatives as noted in HPI.     PHYSICAL EXAM:  Visit Vitals  /69   Pulse (!) 116   Temp 36 °C (96.8 °F)   Resp 20     Constitutional: Well-developed and well-nourished. No distress.    Head: Normocephalic and atraumatic.    Neck: Normal range of motion.     Pulmonary/Chest: Effort normal. No respiratory distress.   Abdominal: Non-distended.  : See below.  Integumentary: No rash or lesions visualized.  Musculoskeletal: Normal range of motion.    Neurological: Alert and oriented.  Psychiatric: Normal mood and affect. Thought content normal.      LABORATORY REVIEW:   Lab Results   Component Value Date    BUN 15 01/09/2024    CREATININE 0.84 01/09/2024    EGFR >90 01/09/2024     01/09/2024    K 4.3 01/09/2024     01/09/2024    CO2 27 01/09/2024    CALCIUM 8.2 (L) 01/09/2024      Lab Results   Component Value Date    WBC 10.4 01/09/2024    RBC 4.95 01/09/2024    HGB 13.4 (L) 01/09/2024    HCT 43.4 01/09/2024    MCV 88 01/09/2024    MCH 27.1 01/09/2024    MCHC 30.9 (L) 01/09/2024    RDW 14.1 01/09/2024     01/09/2024        Lab Results   Component Value Date    PSA 4.17 (H) 06/01/2023    PSA 3.3 05/10/2022    PSA 1.95 05/18/2021    PSA 1.8 01/06/2021    PSA 5.17 (H) 11/09/2020    PSA 2.09 05/23/2020    PSA 1.74 11/02/2019    PSA 1.97 03/09/2019           Assessment:      1. Prostate cancer (CMS/[facility])            Ko Porter is a 63 y.o. with prostate cancer s/p RALP with Dr. Soni 1/8/24  Successful TOV; 90 mL instilled, 90 mL voided     Plan:   Reviewed post-op expectations and red flags  Advised to return to clinic or go to ED if unable to void  RTC with Dr. Soni as scheduled  Encouraged to  contact us in the interim with any questions, concerns

## 2024-01-15 DIAGNOSIS — I25.10 CORONARY ARTERY DISEASE INVOLVING NATIVE CORONARY ARTERY OF NATIVE HEART WITHOUT ANGINA PECTORIS: ICD-10-CM

## 2024-01-18 RX ORDER — ATORVASTATIN CALCIUM 10 MG/1
10 TABLET, FILM COATED ORAL DAILY
Qty: 90 TABLET | Refills: 1 | OUTPATIENT
Start: 2024-01-18

## 2024-01-22 ENCOUNTER — PATIENT OUTREACH (OUTPATIENT)
Dept: CARE COORDINATION | Facility: CLINIC | Age: 64
End: 2024-01-22
Payer: COMMERCIAL

## 2024-01-22 NOTE — PROGRESS NOTES
/Call regarding appt. with PCP  after hospitalization.changed appt to feb   At time of outreach call the patient feels as if their condition has (improved  since last visit.  Reviewed the PCP appointment with the pt and addressed any questions or concerns.

## 2024-01-24 NOTE — PROGRESS NOTES
UROLOGIC FOLLOW-UP VISIT     PROBLEM LIST:  1. No diagnosis found.     HISTORY OF PRESENT ILLNESS:   64 y/o male referred from my colleague Dr. Delgado for evalaution of elevated PSA and abdnormal pMRI. Patient has a history of BPH and LUTS. He had a PSA level of 4.17 and underwent a pMRI which revealed both a PIRADS 3 lesion and PIRADS 4 lesion. Patient underwent MR guided prostate biopsy on 10/13/2023 that showed GS 8, GG4 prostate cancer. He underwent PSMA PET scan on 12/12/2023 that showed Heterogenous Ga68 PSMA uptake throughout the prostate gland. Findings are nonspecific and may represent inflammatory changes versus biopsy-proven malignancy. Negative for distant metastatic disease Patient denies any fh of prostate cancer. He underwent RALP on 1/8/2023 with Dr. Soni and presents today for pathology results. He denies any hematuria or dysuria. Denies any f/c/n/v.     PAST MEDICAL HISTORY:  Past Medical History:   Diagnosis Date    Achilles tendinitis 06/16/2023    Anxiety 06/16/2023    BPH (benign prostatic hyperplasia)     Diabetes mellitus (CMS/HCC) 06/16/2023    A1C= 6.9% on 11/6/23    ED (erectile dysfunction)     GERD (gastroesophageal reflux disease)     Hyperlipidemia     Hypertension     LEO (obstructive sleep apnea)     Prostate CA (CMS/HCC)        PAST SURGICAL HISTORY:  Past Surgical History:   Procedure Laterality Date    APPENDECTOMY  01/22/2016    Appendectomy    COLONOSCOPY  10/02/2013    Complete Colonoscopy    OTHER SURGICAL HISTORY Left 10/30/2020    Cataract surgery        ALLERGIES:   No Known Allergies     MEDICATIONS:     Current Outpatient Medications:     acetaminophen (Tylenol) 325 mg tablet, Take 2 tablets (650 mg) by mouth every 6 hours if needed for mild pain (1 - 3)., Disp: 30 tablet, Rfl: 0    aspirin 81 mg EC tablet, Take 1 tablet (81 mg) by mouth once daily., Disp: , Rfl:     atorvastatin (Lipitor) 10 mg tablet, Take 1 tablet (10 mg) by mouth once daily., Disp: 90 tablet, Rfl:  1    empagliflozin (Jardiance) 10 mg, Take 1 tablet (10 mg) by mouth once daily., Disp: , Rfl:     FreeStyle Reg 2 Sensor kit, USE FOR 14 DAYS AND REPLACE., Disp: , Rfl:     insulin aspart (NovoLOG FlexPen U-100 Insulin) 100 unit/mL (3 mL) pen, Inject under the skin., Disp: , Rfl:     insulin glargine (Basaglar KwikPen U-100 Insulin) 100 unit/mL (3 mL) pen, Inject under the skin once daily., Disp: , Rfl:     ketorolac (Toradol) 10 mg tablet, Take 1 tablet (10 mg) by mouth every 6 hours if needed for moderate pain (4 - 6)., Disp: 20 tablet, Rfl: 0    lisinopril 20 mg tablet, Take 1 tablet (20 mg) by mouth once daily., Disp: , Rfl:     metFORMIN (Glucophage) 1,000 mg tablet, TAKE 1 TABLET TWICE A DAY, Disp: 180 tablet, Rfl: 3    methocarbamol (Robaxin) 500 mg tablet, Take 1 tablet (500 mg) by mouth 4 times a day for 7 days., Disp: 28 tablet, Rfl: 0    multivitamin tablet, Take 1 tablet by mouth once daily., Disp: , Rfl:     semaglutide (Ozempic) 1 mg/dose (4 mg/3 mL) pen injector, INJECT 1 MG SUBCUTANEOUSLY WEEKLY, Disp: 9 mL, Rfl: 3    semaglutide (Ozempic) 2 mg/dose (8 mg/3 mL) pen injector, Inject under the skin., Disp: , Rfl:     tadalafil (Cialis) 5 mg tablet, Take 1 tablet (5 mg) by mouth once daily., Disp: 90 tablet, Rfl: 1      SOCIAL HISTORY:  Patient  reports that he has never smoked. He has never used smokeless tobacco. He reports that he does not currently use alcohol. He reports that he does not use drugs.   Social History     Socioeconomic History    Marital status:      Spouse name: Not on file    Number of children: Not on file    Years of education: Not on file    Highest education level: Not on file   Occupational History    Not on file   Tobacco Use    Smoking status: Never    Smokeless tobacco: Never   Substance and Sexual Activity    Alcohol use: Not Currently    Drug use: Never    Sexual activity: Not on file   Other Topics Concern    Not on file   Social History Narrative    Not on  file     Social Determinants of Health     Financial Resource Strain: Not on file   Food Insecurity: Not on file   Transportation Needs: Not on file   Physical Activity: Not on file   Stress: Not on file   Social Connections: Not on file   Intimate Partner Violence: Not on file   Housing Stability: Not on file       FAMILY HISTORY:  Family History   Problem Relation Name Age of Onset    Diabetes Father      Cancer Maternal Grandfather         REVIEW OF SYSTEMS:***  Constitutional: Negative for fever and chills. Denies anorexia, weight loss.  Eyes: Negative for visual disturbance.   Respiratory: Negative for shortness of breath.    Cardiovascular: Negative for chest pain.   Gastrointestinal: Negative for nausea and vomiting.   Genitourinary: See interval history above.  Skin: Negative for rash.   Neurological: Negative for dizziness and numbness.   Psychiatric/Behavioral: Negative for confusion and decreased concentration.     PHYSICAL EXAM:  There were no vitals taken for this visit.  Constitutional: Patient appears well-developed and well-nourished. No distress.    Head: Normocephalic and atraumatic.    Neck: Normal range of motion.    Cardiovascular: Normal rate.    Pulmonary/Chest: Effort normal. No respiratory distress.   Abdominal: ***  : ***  Musculoskeletal: Normal range of motion.    Neurological: Alert and oriented to person, place, and time.  Psychiatric: Normal mood and affect. Behavior is normal. Thought content normal.      PATHOLOGY REVIEW:  ***    RADIOLOGY REVIEW:  [unfilled]    LABORATORY REVIEW:   [unfilled]    Lab Results   Component Value Date    BUN 15 01/09/2024    CREATININE 0.84 01/09/2024    EGFR >90 01/09/2024     01/09/2024    K 4.3 01/09/2024     01/09/2024    CO2 27 01/09/2024    CALCIUM 8.2 (L) 01/09/2024      Lab Results   Component Value Date    WBC 10.4 01/09/2024    RBC 4.95 01/09/2024    HGB 13.4 (L) 01/09/2024    HCT 43.4 01/09/2024    MCV 88 01/09/2024    MCH  27.1 01/09/2024    MCHC 30.9 (L) 01/09/2024    RDW 14.1 01/09/2024     01/09/2024        Lab Results   Component Value Date    PSA 4.17 (H) 06/01/2023    PSA 3.3 05/10/2022    PSA 1.95 05/18/2021    PSA 1.8 01/06/2021    PSA 5.17 (H) 11/09/2020    PSA 2.09 05/23/2020    PSA 1.74 11/02/2019    PSA 1.97 03/09/2019        *** Urine dipstick shows {ua dip:092193}.  Micro exam: {urine micro:814663}.       Assessment:    No diagnosis found.    Ko Porter is a 63 y.o. male with ***     Plan:    ***    * _ minutes total spent on patient's care today; >50% time spent on counseling/coordination of care

## 2024-01-29 ENCOUNTER — APPOINTMENT (OUTPATIENT)
Dept: UROLOGY | Facility: HOSPITAL | Age: 64
End: 2024-01-29
Payer: COMMERCIAL

## 2024-01-29 LAB
LAB AP ASR DISCLAIMER: NORMAL
LABORATORY COMMENT REPORT: NORMAL
PATH REPORT.FINAL DX SPEC: NORMAL
PATH REPORT.GROSS SPEC: NORMAL
PATH REPORT.RELEVANT HX SPEC: NORMAL
PATH REPORT.TOTAL CANCER: NORMAL
PATHOLOGY SYNOPTIC REPORT: NORMAL

## 2024-01-29 PROCEDURE — 88344 IMHCHEM/IMCYTCHM EA MLT ANTB: CPT | Performed by: PATHOLOGY

## 2024-01-29 PROCEDURE — 88344 IMHCHEM/IMCYTCHM EA MLT ANTB: CPT | Mod: TC | Performed by: STUDENT IN AN ORGANIZED HEALTH CARE EDUCATION/TRAINING PROGRAM

## 2024-02-02 NOTE — PROGRESS NOTES
UROLOGIC FOLLOW-UP VISIT     PROBLEM LIST:  1. Prostate cancer (CMS/HCC)  PSA           HISTORY OF PRESENT ILLNESS:     62 y/o male with hx of GG4 prostate cancer on biopsy. PSMA was negative for distant or issac mets. Patient underwent RALP on 1/8/24. He's been doing well since surgery. He denies any issues with urinary incontinence, but does endorse ED. He denies any abd pain or issues tolerating a diet. Denies any f/c/n/v.     PAST MEDICAL HISTORY:  Past Medical History:   Diagnosis Date    Achilles tendinitis 06/16/2023    Anxiety 06/16/2023    BPH (benign prostatic hyperplasia)     Diabetes mellitus (CMS/HCC) 06/16/2023    A1C= 6.9% on 11/6/23    ED (erectile dysfunction)     GERD (gastroesophageal reflux disease)     Hyperlipidemia     Hypertension     LEO (obstructive sleep apnea)     Prostate CA (CMS/[facility])        PAST SURGICAL HISTORY:  Past Surgical History:   Procedure Laterality Date    APPENDECTOMY  01/22/2016    Appendectomy    COLONOSCOPY  10/02/2013    Complete Colonoscopy    OTHER SURGICAL HISTORY Left 10/30/2020    Cataract surgery    PROSTATECTOMY          ALLERGIES:   No Known Allergies     MEDICATIONS:     Current Outpatient Medications:     acetaminophen (Tylenol) 325 mg tablet, Take 2 tablets (650 mg) by mouth every 6 hours if needed for mild pain (1 - 3)., Disp: 30 tablet, Rfl: 0    aspirin 81 mg EC tablet, Take 1 tablet (81 mg) by mouth once daily., Disp: , Rfl:     atorvastatin (Lipitor) 10 mg tablet, Take 1 tablet (10 mg) by mouth once daily., Disp: 90 tablet, Rfl: 1    empagliflozin (Jardiance) 10 mg, Take 1 tablet (10 mg) by mouth once daily., Disp: , Rfl:     FreeStyle Reg 2 Sensor kit, USE FOR 14 DAYS AND REPLACE., Disp: , Rfl:     insulin aspart (NovoLOG FlexPen U-100 Insulin) 100 unit/mL (3 mL) pen, Inject under the skin., Disp: , Rfl:     insulin glargine (Basaglar KwikPen U-100 Insulin) 100 unit/mL (3 mL) pen, Inject under the skin once daily., Disp: , Rfl:     lisinopril 20 mg  tablet, Take 1 tablet (20 mg) by mouth once daily., Disp: 90 tablet, Rfl: 1    metFORMIN (Glucophage) 1,000 mg tablet, TAKE 1 TABLET TWICE A DAY, Disp: 180 tablet, Rfl: 3    semaglutide (Ozempic) 2 mg/dose (8 mg/3 mL) pen injector, Inject under the skin., Disp: , Rfl:     tadalafil (Cialis) 5 mg tablet, Take 1 tablet (5 mg) by mouth once daily., Disp: 90 tablet, Rfl: 1      SOCIAL HISTORY:  Patient  reports that he has never smoked. He has never used smokeless tobacco. He reports that he does not currently use alcohol. He reports that he does not use drugs.   Social History     Socioeconomic History    Marital status:      Spouse name: Not on file    Number of children: Not on file    Years of education: Not on file    Highest education level: Not on file   Occupational History    Not on file   Tobacco Use    Smoking status: Never    Smokeless tobacco: Never   Substance and Sexual Activity    Alcohol use: Not Currently    Drug use: Never    Sexual activity: Not on file   Other Topics Concern    Not on file   Social History Narrative    Not on file     Social Determinants of Health     Financial Resource Strain: Not on file   Food Insecurity: Not on file   Transportation Needs: Not on file   Physical Activity: Not on file   Stress: Not on file   Social Connections: Not on file   Intimate Partner Violence: Not on file   Housing Stability: Not on file       FAMILY HISTORY:  Family History   Problem Relation Name Age of Onset    Diabetes Father      Cancer Maternal Grandfather         REVIEW OF SYSTEMS:  Constitutional: Negative for fever and chills. Denies anorexia, weight loss.  Eyes: Negative for visual disturbance.   Respiratory: Negative for shortness of breath.    Cardiovascular: Negative for chest pain.   Gastrointestinal: Negative for nausea and vomiting.   Genitourinary: See interval history above.  Skin: Negative for rash.   Neurological: Negative for dizziness and numbness.   Psychiatric/Behavioral:  Negative for confusion and decreased concentration.     PHYSICAL EXAM:  There were no vitals taken for this visit.  Constitutional: Patient appears well-developed and well-nourished. No distress.    Head: Normocephalic and atraumatic.    Neck: Normal range of motion.    Cardiovascular: Normal rate.    Pulmonary/Chest: Effort normal. No respiratory distress.   Abdominal: surgical incisions well healed, c/d/I, soft NTND  Musculoskeletal: Normal range of motion.    Neurological: Alert and oriented to person, place, and time.  Psychiatric: Normal mood and affect. Behavior is normal. Thought content normal.      PATHOLOGY REVIEW:  Case Summary Report   PROSTATE GLAND: Radical Prostatectomy   8th Edition - Protocol posted: 11/10/2021PROSTATE GLAND: RADICAL PROSTATECTOMY - A  SPECIMEN   Procedure  Radical prostatectomy   Prostate Size     Prostate Weight (Grams)  70.8 g   Prostate Greatest Dimension (Centimeters)  5.2 cm   Additional Prostate Dimension (Centimeters)  5.2 cm     4.7 cm   TUMOR   Histologic Type  Acinar adenocarcinoma     Ductal adenocarcinoma   Histologic Grade     Grade  Grade group 4 (Leah Score 4 + 4 = 8)   Intraductal Carcinoma (IDC)  Present   IDC Incorporated into Grade  No   Cribriform Glands  Present   Treatment Effect  No known presurgical therapy   TUMOR QUANTITATION     Estimated Percentage of Prostate Involved by Tumor  11 - 20%   Extraprostatic Extension (EPE)  Not identified   Urinary Bladder Neck Invasion  Not identified   Seminal Vesicle Invasion  Not identified   Lymphovascular Invasion  Not Identified   Perineural Invasion  Not identified   MARGINS   Margin Status  All margins negative for invasive carcinoma   REGIONAL LYMPH NODES   Regional Lymph Node Status  All regional lymph nodes negative for tumor   Number of Lymph Nodes Examined  7   PATHOLOGIC STAGE CLASSIFICATION (pTNM, AJCC 8th Edition)   Reporting of pT, pN, and (when applicable) pM categories is based on information  available to the pathologist at the time the report is issued. As per the AJCC (Chapter 1, 8th Ed.) it is the managing physician’s responsibility to establish the final pathologic stage based upon all pertinent information, including but potentially not limited to this pathology report.   Primary Tumor (pT)  pT2   pN Category  pN0        LABORATORY REVIEW:     Lab Results   Component Value Date    BUN 15 01/09/2024    CREATININE 0.84 01/09/2024    EGFR >90 01/09/2024     01/09/2024    K 4.3 01/09/2024     01/09/2024    CO2 27 01/09/2024    CALCIUM 8.2 (L) 01/09/2024      Lab Results   Component Value Date    WBC 10.4 01/09/2024    RBC 4.95 01/09/2024    HGB 13.4 (L) 01/09/2024    HCT 43.4 01/09/2024    MCV 88 01/09/2024    MCH 27.1 01/09/2024    MCHC 30.9 (L) 01/09/2024    RDW 14.1 01/09/2024     01/09/2024        Lab Results   Component Value Date    PSA 4.17 (H) 06/01/2023    PSA 3.3 05/10/2022    PSA 1.95 05/18/2021    PSA 1.8 01/06/2021    PSA 5.17 (H) 11/09/2020    PSA 2.09 05/23/2020    PSA 1.74 11/02/2019    PSA 1.97 03/09/2019       Assessment:      1. Prostate cancer (CMS/[facility])  PSA          Plan:    Reviewed and interpreted patient's pathology report with him today    Counseled patient on the aggressiveness of his disease and the link between recurrence   Discussed with patient that we will obtain PSA results to surveillance    Patient has erectile dysfunction so discuss with him the recovery of erectile function after surgery    Will prescribe 20mg of cialis, discussed with patient proper dosing    29 minutes total spent on patient's care today; >50% time spent on counseling/coordination of care

## 2024-02-04 DIAGNOSIS — Z12.11 COLON CANCER SCREENING: Primary | ICD-10-CM

## 2024-02-05 ENCOUNTER — OFFICE VISIT (OUTPATIENT)
Dept: UROLOGY | Facility: HOSPITAL | Age: 64
End: 2024-02-05
Payer: COMMERCIAL

## 2024-02-05 ENCOUNTER — OFFICE VISIT (OUTPATIENT)
Dept: PRIMARY CARE | Facility: CLINIC | Age: 64
End: 2024-02-05
Payer: COMMERCIAL

## 2024-02-05 ENCOUNTER — TELEPHONE (OUTPATIENT)
Dept: PRIMARY CARE | Facility: CLINIC | Age: 64
End: 2024-02-05

## 2024-02-05 VITALS
OXYGEN SATURATION: 98 % | HEART RATE: 88 BPM | BODY MASS INDEX: 28.85 KG/M2 | HEIGHT: 64 IN | SYSTOLIC BLOOD PRESSURE: 118 MMHG | RESPIRATION RATE: 12 BRPM | DIASTOLIC BLOOD PRESSURE: 74 MMHG | WEIGHT: 169 LBS

## 2024-02-05 DIAGNOSIS — I10 PRIMARY HYPERTENSION: ICD-10-CM

## 2024-02-05 DIAGNOSIS — E11.69 TYPE 2 DIABETES MELLITUS WITH HYPERLIPIDEMIA (MULTI): ICD-10-CM

## 2024-02-05 DIAGNOSIS — I25.10 CORONARY ARTERY DISEASE INVOLVING NATIVE CORONARY ARTERY OF NATIVE HEART WITHOUT ANGINA PECTORIS: ICD-10-CM

## 2024-02-05 DIAGNOSIS — Z00.00 HEALTHCARE MAINTENANCE: Primary | ICD-10-CM

## 2024-02-05 DIAGNOSIS — C61 PROSTATE CANCER (MULTI): Primary | ICD-10-CM

## 2024-02-05 DIAGNOSIS — E78.5 TYPE 2 DIABETES MELLITUS WITH HYPERLIPIDEMIA (MULTI): ICD-10-CM

## 2024-02-05 PROBLEM — Z12.11 COLON CANCER SCREENING: Status: ACTIVE | Noted: 2024-02-05

## 2024-02-05 PROCEDURE — 99396 PREV VISIT EST AGE 40-64: CPT | Performed by: FAMILY MEDICINE

## 2024-02-05 PROCEDURE — 4010F ACE/ARB THERAPY RXD/TAKEN: CPT | Performed by: STUDENT IN AN ORGANIZED HEALTH CARE EDUCATION/TRAINING PROGRAM

## 2024-02-05 PROCEDURE — 4010F ACE/ARB THERAPY RXD/TAKEN: CPT | Performed by: FAMILY MEDICINE

## 2024-02-05 PROCEDURE — 1036F TOBACCO NON-USER: CPT | Performed by: FAMILY MEDICINE

## 2024-02-05 PROCEDURE — 99213 OFFICE O/P EST LOW 20 MIN: CPT | Performed by: STUDENT IN AN ORGANIZED HEALTH CARE EDUCATION/TRAINING PROGRAM

## 2024-02-05 PROCEDURE — 99024 POSTOP FOLLOW-UP VISIT: CPT | Performed by: STUDENT IN AN ORGANIZED HEALTH CARE EDUCATION/TRAINING PROGRAM

## 2024-02-05 PROCEDURE — 3078F DIAST BP <80 MM HG: CPT | Performed by: FAMILY MEDICINE

## 2024-02-05 PROCEDURE — 1036F TOBACCO NON-USER: CPT | Performed by: STUDENT IN AN ORGANIZED HEALTH CARE EDUCATION/TRAINING PROGRAM

## 2024-02-05 PROCEDURE — 3074F SYST BP LT 130 MM HG: CPT | Performed by: FAMILY MEDICINE

## 2024-02-05 RX ORDER — TADALAFIL 20 MG/1
20 TABLET ORAL DAILY PRN
Qty: 30 TABLET | Refills: 3 | Status: SHIPPED | OUTPATIENT
Start: 2024-02-05 | End: 2024-03-06

## 2024-02-05 RX ORDER — ATORVASTATIN CALCIUM 10 MG/1
10 TABLET, FILM COATED ORAL DAILY
Qty: 90 TABLET | Refills: 1 | Status: SHIPPED | OUTPATIENT
Start: 2024-02-05

## 2024-02-05 RX ORDER — LISINOPRIL 20 MG/1
20 TABLET ORAL DAILY
Qty: 90 TABLET | Refills: 1 | Status: SHIPPED | OUTPATIENT
Start: 2024-02-05

## 2024-02-05 ASSESSMENT — ENCOUNTER SYMPTOMS
BLOOD IN STOOL: 0
DYSURIA: 0
ARTHRALGIAS: 0
FEVER: 0
WEAKNESS: 0
EYE REDNESS: 0
DIARRHEA: 0
DYSPHORIC MOOD: 0
ABDOMINAL DISTENTION: 0
DIFFICULTY URINATING: 0
FATIGUE: 0
SHORTNESS OF BREATH: 0
BACK PAIN: 0
APPETITE CHANGE: 0
ADENOPATHY: 0
CONSTIPATION: 0
DIZZINESS: 0
HEADACHES: 0
COUGH: 0
NERVOUS/ANXIOUS: 0
ABDOMINAL PAIN: 0
CHEST TIGHTNESS: 0
EYE PAIN: 0
SORE THROAT: 0
BRUISES/BLEEDS EASILY: 0
CHILLS: 0

## 2024-02-05 NOTE — PROGRESS NOTES
"Subjective   Patient ID: Ko Porter is a 63 y.o. male who presents for Annual Exam.  PMHX, PSHx, Fam hx, and Social hx reviewed.   New concerns  - he is doing well post op from prostatectomy in December. Down about 30# with Ozempic since the summer. A1c much better at 6.9.   Vaccines MMR, Shingrix, Flu shots recommended  Dentist seen at least yearly yes  Vision concerns none  Hearing concerns none  Diet is usually overall healthy.   Smoker - no  Alcohol use - no  Exercising 0 days per week.   Sexually active - no  Colon screening overdue           Review of Systems   Constitutional:  Negative for appetite change, chills, fatigue and fever.   HENT:  Negative for congestion, hearing loss and sore throat.         To see dentist for tooth pain   Eyes:  Negative for pain, redness and visual disturbance.   Respiratory:  Negative for cough, chest tightness and shortness of breath.    Cardiovascular:  Negative for chest pain and leg swelling.   Gastrointestinal:  Negative for abdominal distention, abdominal pain, blood in stool, constipation and diarrhea.   Genitourinary:  Negative for difficulty urinating and dysuria.   Musculoskeletal:  Negative for arthralgias and back pain.   Skin:  Negative for rash.   Neurological:  Positive for syncope. Negative for dizziness, weakness and headaches.   Hematological:  Negative for adenopathy. Does not bruise/bleed easily.   Psychiatric/Behavioral:  Negative for dysphoric mood. The patient is not nervous/anxious.        Objective   /74   Pulse 88   Resp 12   Ht 1.626 m (5' 4\")   Wt 76.7 kg (169 lb)   SpO2 98%   BMI 29.01 kg/m²    Physical Exam  Constitutional:       General: He is not in acute distress.     Appearance: Normal appearance. He is not ill-appearing.   HENT:      Head: Normocephalic and atraumatic.      Right Ear: Tympanic membrane, ear canal and external ear normal.      Left Ear: Tympanic membrane, ear canal and external ear normal.      Nose: Nose " normal.      Mouth/Throat:      Mouth: Mucous membranes are moist.      Pharynx: No oropharyngeal exudate or posterior oropharyngeal erythema.   Eyes:      Extraocular Movements: Extraocular movements intact.      Conjunctiva/sclera: Conjunctivae normal.      Pupils: Pupils are equal, round, and reactive to light.   Neck:      Vascular: No carotid bruit.   Cardiovascular:      Rate and Rhythm: Normal rate and regular rhythm.      Heart sounds: Normal heart sounds. No murmur heard.  Pulmonary:      Breath sounds: Normal breath sounds. No wheezing, rhonchi or rales.   Abdominal:      General: Bowel sounds are normal. There is no distension.      Palpations: Abdomen is soft. There is no mass.      Tenderness: There is no abdominal tenderness.   Musculoskeletal:         General: No swelling or deformity.      Cervical back: Neck supple. No tenderness.   Lymphadenopathy:      Cervical: No cervical adenopathy.   Skin:     General: Skin is warm and dry.      Findings: No lesion or rash.   Neurological:      Mental Status: He is alert and oriented to person, place, and time.      Sensory: No sensory deficit.      Motor: No weakness.      Coordination: Coordination normal.      Deep Tendon Reflexes: Reflexes normal.   Psychiatric:         Mood and Affect: Mood normal.         Behavior: Behavior normal.         Judgment: Judgment normal.           Assessment/Plan   Diagnoses and all orders for this visit:  Healthcare maintenance - MMR and Shingrix vaccines recommended. Recent labs reviewed. Cologuard ordered.  Type 2 diabetes mellitus with hyperlipidemia - much better with Ozempic, keep working on diet. Keep fu with Dr Freeman. Due for diabetic eye exam    Follow up in 6 months, 30mins

## 2024-02-05 NOTE — PROGRESS NOTES
"Subjective   Patient ID: Ko Porter is a 63 y.o. male who presents for Annual Exam.    HPI     Review of Systems    Objective   Resp 12   Ht 1.626 m (5' 4\")   Wt 76.7 kg (169 lb)   BMI 29.01 kg/m²     Physical Exam    Assessment/Plan          "

## 2024-02-06 ENCOUNTER — PATIENT OUTREACH (OUTPATIENT)
Dept: CARE COORDINATION | Facility: CLINIC | Age: 64
End: 2024-02-06
Payer: COMMERCIAL

## 2024-02-13 DIAGNOSIS — E11.69 TYPE 2 DIABETES MELLITUS WITH MORBID OBESITY (MULTI): Primary | ICD-10-CM

## 2024-02-13 DIAGNOSIS — E66.01 TYPE 2 DIABETES MELLITUS WITH MORBID OBESITY (MULTI): Primary | ICD-10-CM

## 2024-02-13 RX ORDER — INSULIN GLARGINE 100 [IU]/ML
46 INJECTION, SOLUTION SUBCUTANEOUS
Qty: 41.4 ML | Refills: 3 | Status: SHIPPED | OUTPATIENT
Start: 2024-02-13 | End: 2024-03-05 | Stop reason: WASHOUT

## 2024-02-20 ENCOUNTER — LAB (OUTPATIENT)
Dept: LAB | Facility: LAB | Age: 64
End: 2024-02-20
Payer: COMMERCIAL

## 2024-02-20 DIAGNOSIS — C61 PROSTATE CANCER (MULTI): ICD-10-CM

## 2024-02-20 LAB — PSA SERPL-MCNC: <0.1 NG/ML

## 2024-02-20 PROCEDURE — 84153 ASSAY OF PSA TOTAL: CPT

## 2024-02-20 PROCEDURE — 36415 COLL VENOUS BLD VENIPUNCTURE: CPT

## 2024-02-21 ENCOUNTER — TELEPHONE (OUTPATIENT)
Dept: UROLOGY | Facility: HOSPITAL | Age: 64
End: 2024-02-21
Payer: COMMERCIAL

## 2024-02-21 DIAGNOSIS — C61 MALIGNANT NEOPLASM OF PROSTATE (MULTI): Primary | ICD-10-CM

## 2024-02-21 NOTE — TELEPHONE ENCOUNTER
Called patient at [phone] to inform him of PSA results. Most recent PSA was undetectable. I reminded patient to completed another PSA blood draw 1 week to his appointment with Dr. Soni on 4/29/24. Patient understood and will complete this. Patient denies any LUTS.

## 2024-02-22 ENCOUNTER — TELEPHONE (OUTPATIENT)
Dept: ENDOCRINOLOGY | Facility: CLINIC | Age: 64
End: 2024-02-22
Payer: COMMERCIAL

## 2024-02-22 DIAGNOSIS — E66.01 TYPE 2 DIABETES MELLITUS WITH MORBID OBESITY (MULTI): Primary | ICD-10-CM

## 2024-02-22 DIAGNOSIS — E11.69 TYPE 2 DIABETES MELLITUS WITH MORBID OBESITY (MULTI): Primary | ICD-10-CM

## 2024-02-22 RX ORDER — INSULIN DEGLUDEC 100 U/ML
46 INJECTION, SOLUTION SUBCUTANEOUS DAILY
Qty: 41.4 ML | Refills: 3 | Status: SHIPPED | OUTPATIENT
Start: 2024-02-22 | End: 2024-02-23 | Stop reason: WASHOUT

## 2024-02-22 NOTE — TELEPHONE ENCOUNTER
Basaglar is not covered this year by insurance    Preferred are lantus or tresiba     [facility]     Next ov 03/05/24

## 2024-02-23 RX ORDER — INSULIN GLARGINE 100 [IU]/ML
46 INJECTION, SOLUTION SUBCUTANEOUS DAILY
Qty: 41.4 ML | Refills: 3 | Status: SHIPPED | OUTPATIENT
Start: 2024-02-23 | End: 2024-03-05 | Stop reason: SDUPTHER

## 2024-02-27 LAB — NONINV COLON CA DNA+OCC BLD SCRN STL QL: NEGATIVE

## 2024-02-29 ENCOUNTER — TELEPHONE (OUTPATIENT)
Dept: PRIMARY CARE | Facility: CLINIC | Age: 64
End: 2024-02-29
Payer: COMMERCIAL

## 2024-02-29 NOTE — TELEPHONE ENCOUNTER
----- Message from Enmanuel Lui MD sent at 2/28/2024  1:28 PM EST -----  Cologuard screen is negative, plan to repeat Cologuard or Colonoscopy in 3 years    ----- Message -----  From: Johanna Ham Results In  Sent: 2/28/2024   8:31 AM EST  To: Enmanuel Lui MD

## 2024-03-05 ENCOUNTER — LAB (OUTPATIENT)
Dept: LAB | Facility: LAB | Age: 64
End: 2024-03-05
Payer: COMMERCIAL

## 2024-03-05 ENCOUNTER — OFFICE VISIT (OUTPATIENT)
Dept: ENDOCRINOLOGY | Facility: CLINIC | Age: 64
End: 2024-03-05
Payer: COMMERCIAL

## 2024-03-05 VITALS
HEIGHT: 64 IN | WEIGHT: 175.8 LBS | BODY MASS INDEX: 30.01 KG/M2 | SYSTOLIC BLOOD PRESSURE: 130 MMHG | RESPIRATION RATE: 16 BRPM | DIASTOLIC BLOOD PRESSURE: 70 MMHG | HEART RATE: 80 BPM

## 2024-03-05 DIAGNOSIS — E11.69 TYPE 2 DIABETES MELLITUS WITH MORBID OBESITY (MULTI): ICD-10-CM

## 2024-03-05 DIAGNOSIS — E11.69 TYPE 2 DIABETES MELLITUS WITH MORBID OBESITY (MULTI): Primary | ICD-10-CM

## 2024-03-05 DIAGNOSIS — E78.2 MIXED HYPERLIPIDEMIA: ICD-10-CM

## 2024-03-05 DIAGNOSIS — E66.01 TYPE 2 DIABETES MELLITUS WITH MORBID OBESITY (MULTI): Primary | ICD-10-CM

## 2024-03-05 DIAGNOSIS — I10 HYPERTENSION, UNSPECIFIED TYPE: ICD-10-CM

## 2024-03-05 DIAGNOSIS — E66.01 TYPE 2 DIABETES MELLITUS WITH MORBID OBESITY (MULTI): ICD-10-CM

## 2024-03-05 LAB
ALBUMIN SERPL BCP-MCNC: 4.4 G/DL (ref 3.4–5)
ALP SERPL-CCNC: 75 U/L (ref 33–136)
ALT SERPL W P-5'-P-CCNC: 14 U/L (ref 10–52)
ANION GAP SERPL CALC-SCNC: 13 MMOL/L (ref 10–20)
AST SERPL W P-5'-P-CCNC: 13 U/L (ref 9–39)
BILIRUB SERPL-MCNC: 0.7 MG/DL (ref 0–1.2)
BUN SERPL-MCNC: 16 MG/DL (ref 6–23)
CALCIUM SERPL-MCNC: 9.7 MG/DL (ref 8.6–10.6)
CHLORIDE SERPL-SCNC: 105 MMOL/L (ref 98–107)
CO2 SERPL-SCNC: 27 MMOL/L (ref 21–32)
CREAT SERPL-MCNC: 0.93 MG/DL (ref 0.5–1.3)
EGFRCR SERPLBLD CKD-EPI 2021: >90 ML/MIN/1.73M*2
EST. AVERAGE GLUCOSE BLD GHB EST-MCNC: 163 MG/DL
GLUCOSE SERPL-MCNC: 200 MG/DL (ref 74–99)
HBA1C MFR BLD: 7.3 %
POTASSIUM SERPL-SCNC: 4 MMOL/L (ref 3.5–5.3)
PROT SERPL-MCNC: 7 G/DL (ref 6.4–8.2)
SODIUM SERPL-SCNC: 141 MMOL/L (ref 136–145)

## 2024-03-05 PROCEDURE — 3051F HG A1C>EQUAL 7.0%<8.0%: CPT | Performed by: INTERNAL MEDICINE

## 2024-03-05 PROCEDURE — 83036 HEMOGLOBIN GLYCOSYLATED A1C: CPT

## 2024-03-05 PROCEDURE — 3078F DIAST BP <80 MM HG: CPT | Performed by: INTERNAL MEDICINE

## 2024-03-05 PROCEDURE — 36415 COLL VENOUS BLD VENIPUNCTURE: CPT

## 2024-03-05 PROCEDURE — 3075F SYST BP GE 130 - 139MM HG: CPT | Performed by: INTERNAL MEDICINE

## 2024-03-05 PROCEDURE — 80053 COMPREHEN METABOLIC PANEL: CPT

## 2024-03-05 PROCEDURE — 1036F TOBACCO NON-USER: CPT | Performed by: INTERNAL MEDICINE

## 2024-03-05 PROCEDURE — 99214 OFFICE O/P EST MOD 30 MIN: CPT | Performed by: INTERNAL MEDICINE

## 2024-03-05 PROCEDURE — 4010F ACE/ARB THERAPY RXD/TAKEN: CPT | Performed by: INTERNAL MEDICINE

## 2024-03-05 RX ORDER — PEN NEEDLE, DIABETIC 30 GX3/16"
NEEDLE, DISPOSABLE MISCELLANEOUS
COMMUNITY

## 2024-03-05 RX ORDER — PEN NEEDLE, DIABETIC 30 GX3/16"
NEEDLE, DISPOSABLE MISCELLANEOUS
Qty: 300 EACH | Refills: 3 | Status: SHIPPED | OUTPATIENT
Start: 2024-03-05 | End: 2024-03-06 | Stop reason: SDUPTHER

## 2024-03-05 RX ORDER — INSULIN GLARGINE 100 [IU]/ML
20 INJECTION, SOLUTION SUBCUTANEOUS DAILY
Qty: 18 ML | Refills: 3 | Status: SHIPPED | OUTPATIENT
Start: 2024-03-05 | End: 2025-03-05

## 2024-03-05 RX ORDER — SEMAGLUTIDE 0.68 MG/ML
0.5 INJECTION, SOLUTION SUBCUTANEOUS
Qty: 9 ML | Refills: 1 | Status: SHIPPED | OUTPATIENT
Start: 2024-03-05

## 2024-03-05 ASSESSMENT — ENCOUNTER SYMPTOMS
DIZZINESS: 0
NAUSEA: 0
SHORTNESS OF BREATH: 0
VOMITING: 0
LIGHT-HEADEDNESS: 0
CHILLS: 0
DIARRHEA: 0
FEVER: 0

## 2024-03-05 NOTE — PATIENT INSTRUCTIONS
Restart lantus at 20 units  Restart ozempic  Reduce meal insulin to 15 units  Labs today  Follow up in 3 months

## 2024-03-05 NOTE — PROGRESS NOTES
Endocrinology: Follow up visit  Subjective   Patient ID: Ko Porter is a 63 y.o. male who presents for Diabetes (Type 2 ), Hyperlipidemia, and Hypertension.    PCP: Enmanuel Lui MD    HPI  Last seen in November. At that time doing great on basal insulin at 46 meal 15/15 and ozempic+jardiance.     Today sugars way up.  Off basal insulin for months, off ozempic for months.   Basal insulin had issues with brand change from pharmacy but ozempic he stopped on his own as he states recent prostate surgery messed him up so wanted to stop it for awhile.   Feeling fine.  Now doing well s/p surgery and willing to restart meds    Review of Systems   Constitutional:  Negative for chills and fever.   Respiratory:  Negative for shortness of breath.    Gastrointestinal:  Negative for diarrhea, nausea and vomiting.   Endocrine: Negative for cold intolerance and heat intolerance.   Neurological:  Negative for dizziness and light-headedness.       Patient Active Problem List   Diagnosis    Age-related nuclear cataract of left eye    Chalazion of right lower eyelid    Chalazion of right upper eyelid    Elevated PSA    Enlarged prostate with lower urinary tract symptoms (LUTS)    Erectile dysfunction    Gross hematuria    Hyperlipidemia    Hypertension    Hypogonadism male    Impacted cerumen of left ear    Ingrowing toenail    Meibomian gland dysfunction (MGD)    Obesity    S/P cataract surgery    Phimosis    LEO (obstructive sleep apnea)    Type 2 diabetes mellitus with morbid obesity (CMS/HCC)    Urinary incontinence    Vitamin D insufficiency    Cataract, right    Cortical cataract    Vitreous floaters of right eye    Prostate cancer (CMS/HCC)    Elevated coronary artery calcium score    Coronary artery calcification    Difficult intubation    Malignant neoplasm of prostate (CMS/HCC)    Healthcare maintenance    Type 2 diabetes mellitus with hyperlipidemia (CMS/HCC)    Coronary artery disease involving native coronary  "artery of native heart without angina pectoris    Colon cancer screening        Home Meds:  Current Outpatient Medications   Medication Instructions    acetaminophen (TYLENOL) 650 mg, oral, Every 6 hours PRN    aspirin 81 mg EC tablet 1 tablet, oral, Daily    atorvastatin (LIPITOR) 10 mg, oral, Daily    Basaglar KwikPen U-100 Insulin 46 Units, subcutaneous, Daily RT    empagliflozin (Jardiance) 10 mg 1 tablet, oral, Daily    FreeStyle Reg 2 Sensor kit USE FOR 14 DAYS AND REPLACE.    insulin aspart (NovoLOG FlexPen U-100 Insulin) 100 unit/mL (3 mL) pen subcutaneous    insulin glargine (LANTUS) 46 Units, subcutaneous, Daily, Take as directed per insulin instructions.    lisinopril 20 mg, oral, Daily    metFORMIN (GLUCOPHAGE) 1,000 mg, oral, 2 times daily    pen needle, diabetic (BD Belkys 2nd Gen Pen Needle) 32 gauge x 5/32\" needle miscellaneous    semaglutide (Ozempic) 2 mg/dose (8 mg/3 mL) pen injector subcutaneous    tadalafil (CIALIS) 20 mg, oral, Daily PRN        No Known Allergies     Objective   Vitals:    03/05/24 0948   BP: 130/70   Pulse: 80   Resp: 16      Vitals:    03/05/24 0948   Weight: 79.7 kg (175 lb 12.8 oz)      Body mass index is 30.18 kg/m².   Physical Exam  Constitutional:       Appearance: Normal appearance. He is overweight.   HENT:      Head: Normocephalic and atraumatic.   Neck:      Thyroid: No thyroid mass, thyromegaly or thyroid tenderness.   Cardiovascular:      Rate and Rhythm: Normal rate and regular rhythm.      Heart sounds: No murmur heard.     No gallop.   Pulmonary:      Effort: Pulmonary effort is normal.      Breath sounds: Normal breath sounds.   Abdominal:      Palpations: Abdomen is soft.      Comments: benign   Neurological:      General: No focal deficit present.      Mental Status: He is alert and oriented to person, place, and time.      Deep Tendon Reflexes: Reflexes are normal and symmetric.   Psychiatric:         Behavior: Behavior is cooperative.         Labs:  Lab " "Results   Component Value Date    HGBA1C 6.9 (H) 11/06/2023      No results found for: \"PR1\", \"THYROIDPAB\", \"TSI\"     Assessment/Plan   Problem List Items Addressed This Visit       Hyperlipidemia    Hypertension    Type 2 diabetes mellitus with morbid obesity (CMS/HCC) - Primary    Relevant Orders    Comprehensive Metabolic Panel    Hemoglobin A1C   Dm2:  Sugars are way up.  Must work on compliance with meds  Labs today  Restart lantus at 20 units, restart ozempic at lower dose.  Reduce meal insulin to 15/15  Htn:  Bp controlled  Lipids:  Continue statin      Electronically signed by:  Deloris Freeman MD 03/05/24 10:27 AM              "

## 2024-03-06 DIAGNOSIS — E66.01 TYPE 2 DIABETES MELLITUS WITH MORBID OBESITY (MULTI): ICD-10-CM

## 2024-03-06 DIAGNOSIS — E11.69 TYPE 2 DIABETES MELLITUS WITH MORBID OBESITY (MULTI): ICD-10-CM

## 2024-03-06 RX ORDER — PEN NEEDLE, DIABETIC 32GX 5/32"
NEEDLE, DISPOSABLE MISCELLANEOUS
Qty: 300 EACH | Refills: 3 | Status: SHIPPED | OUTPATIENT
Start: 2024-03-06

## 2024-03-27 ENCOUNTER — PROCEDURE VISIT (OUTPATIENT)
Dept: SLEEP MEDICINE | Facility: CLINIC | Age: 64
End: 2024-03-27
Payer: COMMERCIAL

## 2024-03-27 DIAGNOSIS — G47.33 OSA (OBSTRUCTIVE SLEEP APNEA): ICD-10-CM

## 2024-03-27 PROCEDURE — 95806 SLEEP STUDY UNATT&RESP EFFT: CPT | Performed by: INTERNAL MEDICINE

## 2024-03-27 NOTE — PROGRESS NOTES
Type of Study: HOME SLEEP STUDY - NOMAD     The patient received equipment and instructions for use of the [employer] [facility] Nomad HSAT device. The patient was instructed how to apply the effort belts, cannula, thermistor. It was also explained how the Nomad and oximeter components work.  The patient was asked to record their sleep for an 8-hour period.     The patient was informed of their responsibility for the device and acknowledged this by signing the HSAT device contract. The patient was asked to return the device on 3/28/2024 between the hours of 6:30 AM- 6:30 PM to the [facility].     The patient was instructed to call 911 as usual for any medical- emergencies while at home.  The patient was also given a phone number for troubleshooting when using the device in case there were additional questions.

## 2024-03-29 PROCEDURE — 95806 SLEEP STUDY UNATT&RESP EFFT: CPT | Performed by: INTERNAL MEDICINE

## 2024-04-03 ASSESSMENT — SLEEP AND FATIGUE QUESTIONNAIRES
HOW LIKELY ARE YOU TO NOD OFF OR FALL ASLEEP WHILE SITTING AND READING: SLIGHT CHANCE OF DOZING
ESS-CHAD TOTAL SCORE: 9
HOW LIKELY ARE YOU TO NOD OFF OR FALL ASLEEP WHILE WATCHING TV: WOULD NEVER DOZE
HOW LIKELY ARE YOU TO NOD OFF OR FALL ASLEEP WHEN YOU ARE A PASSENGER IN A CAR FOR AN HOUR WITHOUT A BREAK: SLIGHT CHANCE OF DOZING
HOW LIKELY ARE YOU TO NOD OFF OR FALL ASLEEP WHILE SITTING AND TALKING TO SOMEONE: WOULD NEVER DOZE
SITING INACTIVE IN A PUBLIC PLACE LIKE A CLASS ROOM OR A MOVIE THEATER: HIGH CHANCE OF DOZING
HOW LIKELY ARE YOU TO NOD OFF OR FALL ASLEEP WHILE LYING DOWN TO REST IN THE AFTERNOON WHEN CIRCUMSTANCES PERMIT: MODERATE CHANCE OF DOZING
HOW LIKELY ARE YOU TO NOD OFF OR FALL ASLEEP WHILE SITTING QUIETLY AFTER LUNCH WITHOUT ALCOHOL: SLIGHT CHANCE OF DOZING
HOW LIKELY ARE YOU TO NOD OFF OR FALL ASLEEP IN A CAR, WHILE STOPPED FOR A FEW MINUTES IN TRAFFIC: SLIGHT CHANCE OF DOZING

## 2024-04-05 ENCOUNTER — PATIENT OUTREACH (OUTPATIENT)
Dept: CARE COORDINATION | Facility: CLINIC | Age: 64
End: 2024-04-05
Payer: COMMERCIAL

## 2024-04-26 NOTE — PROGRESS NOTES
UROLOGIC FOLLOW-UP VISIT     PROBLEM LIST:  1. Prostate cancer (Multi)  PSA      2. Elevated PSA        3. Malignant neoplasm of prostate (Multi)        4. Erectile dysfunction, unspecified erectile dysfunction type             HISTORY OF PRESENT ILLNESS:   62 y/o male with hx of GG4 prostate cancer on biopsy. PSMA was negative for distant or issac mets. Patient underwent RALP on 1/8/24. He's been doing well since surgery. He denies any issues with stress urinary incontinence, but does endorse urge incontinence. Patient states he's able to achieve an erection good enough for penetration but is unable to sustain an erection. Patient has issues with controlling his blood sugars and states his urinary sx worsen when his blood sugars are elevated.     He denies any abd pain or issues tolerating a diet. Denies any f/c/n/v.     PAST MEDICAL HISTORY:  Past Medical History:   Diagnosis Date    Achilles tendinitis 06/16/2023    Anxiety 06/16/2023    BPH (benign prostatic hyperplasia)     Diabetes mellitus (Multi) 06/16/2023    A1C= 6.9% on 11/6/23    ED (erectile dysfunction)     GERD (gastroesophageal reflux disease)     Hyperlipidemia     Hypertension     LEO (obstructive sleep apnea)     Prostate CA (Multi)        PAST SURGICAL HISTORY:  Past Surgical History:   Procedure Laterality Date    APPENDECTOMY  01/22/2016    Appendectomy    COLONOSCOPY  10/02/2013    Complete Colonoscopy    OTHER SURGICAL HISTORY Left 10/30/2020    Cataract surgery    PROSTATECTOMY          ALLERGIES:   No Known Allergies     MEDICATIONS:     Current Outpatient Medications:     acetaminophen (Tylenol) 325 mg tablet, Take 2 tablets (650 mg) by mouth every 6 hours if needed for mild pain (1 - 3)., Disp: 30 tablet, Rfl: 0    aspirin 81 mg EC tablet, Take 1 tablet (81 mg) by mouth once daily., Disp: , Rfl:     atorvastatin (Lipitor) 10 mg tablet, Take 1 tablet (10 mg) by mouth once daily., Disp: 90 tablet, Rfl: 1    BD Belkys 2nd Gen Pen Needle 32  "gauge x 5/32\" needle, Use with insulin 3x a day, Disp: 300 each, Rfl: 3    empagliflozin (Jardiance) 10 mg, Take 1 tablet (10 mg) by mouth once daily., Disp: , Rfl:     FreeStyle Reg 2 Sensor kit, USE FOR 14 DAYS AND REPLACE., Disp: , Rfl:     insulin aspart (NovoLOG FlexPen U-100 Insulin) 100 unit/mL (3 mL) pen, Inject under the skin., Disp: , Rfl:     insulin glargine (Lantus) 100 unit/mL (3 mL) pen, Inject 20 Units under the skin once daily. Take as directed per insulin instructions., Disp: 18 mL, Rfl: 3    lisinopril 20 mg tablet, Take 1 tablet (20 mg) by mouth once daily., Disp: 90 tablet, Rfl: 1    metFORMIN (Glucophage) 1,000 mg tablet, TAKE 1 TABLET TWICE A DAY, Disp: 180 tablet, Rfl: 3    pen needle, diabetic (BD Belkys 2nd Gen Pen Needle) 32 gauge x 5/32\" needle, , Disp: , Rfl:     semaglutide (Ozempic) 0.25 mg or 0.5 mg (2 mg/3 mL) pen injector, Inject 0.5 mg under the skin every 7 days., Disp: 9 mL, Rfl: 1    semaglutide (Ozempic) 2 mg/dose (8 mg/3 mL) pen injector, Inject under the skin., Disp: , Rfl:     tadalafil (Cialis) 20 mg tablet, Take 1 tablet (20 mg) by mouth once daily as needed for erectile dysfunction., Disp: 30 tablet, Rfl: 3      SOCIAL HISTORY:  Patient  reports that he has never smoked. He has never used smokeless tobacco. He reports that he does not currently use alcohol. He reports that he does not use drugs.   Social History     Socioeconomic History    Marital status:      Spouse name: Not on file    Number of children: Not on file    Years of education: Not on file    Highest education level: Not on file   Occupational History    Not on file   Tobacco Use    Smoking status: Never    Smokeless tobacco: Never   Substance and Sexual Activity    Alcohol use: Not Currently    Drug use: Never    Sexual activity: Not on file   Other Topics Concern    Not on file   Social History Narrative    Not on file     Social Determinants of Health     Financial Resource Strain: Not on file "   Food Insecurity: Not on file   Transportation Needs: Not on file   Physical Activity: Not on file   Stress: Not on file   Social Connections: Not on file   Intimate Partner Violence: Not on file   Housing Stability: Not on file       FAMILY HISTORY:  Family History   Problem Relation Name Age of Onset    Diabetes Father      Cancer Maternal Grandfather         REVIEW OF SYSTEMS:   Constitutional: Negative for fever and chills. Denies anorexia, weight loss.  Eyes: Negative for visual disturbance.   Respiratory: Negative for shortness of breath.    Cardiovascular: Negative for chest pain.   Gastrointestinal: Negative for nausea and vomiting.   Genitourinary: See interval history above.  Skin: Negative for rash.   Neurological: Negative for dizziness and numbness.   Psychiatric/Behavioral: Negative for confusion and decreased concentration.     PHYSICAL EXAM:  There were no vitals taken for this visit.  Constitutional: Patient appears well-developed and well-nourished. No distress.    Head: Normocephalic and atraumatic.    Neck: Normal range of motion.    Cardiovascular: Normal rate.    Pulmonary/Chest: Effort normal. No respiratory distress.   Abdominal: soft NTND  Musculoskeletal: Normal range of motion.    Neurological: Alert and oriented to person, place, and time.  Psychiatric: Normal mood and affect. Behavior is normal. Thought content normal.      LABORATORY REVIEW:     Lab Results   Component Value Date    BUN 16 03/05/2024    CREATININE 0.93 03/05/2024    EGFR >90 03/05/2024     03/05/2024    K 4.0 03/05/2024     03/05/2024    CO2 27 03/05/2024    CALCIUM 9.7 03/05/2024      Lab Results   Component Value Date    WBC 10.4 01/09/2024    RBC 4.95 01/09/2024    HGB 13.4 (L) 01/09/2024    HCT 43.4 01/09/2024    MCV 88 01/09/2024    MCH 27.1 01/09/2024    MCHC 30.9 (L) 01/09/2024    RDW 14.1 01/09/2024     01/09/2024        Lab Results   Component Value Date    PSA <0.10 02/20/2024    PSA 4.17  (H) 06/01/2023    PSA 3.3 05/10/2022    PSA 1.95 05/18/2021    PSA 1.8 01/06/2021    PSA 5.17 (H) 11/09/2020    PSA 2.09 05/23/2020    PSA 1.74 11/02/2019    PSA 1.97 03/09/2019        Assessment:      1. Prostate cancer (Multi)  PSA      2. Elevated PSA        3. Malignant neoplasm of prostate (Multi)        4. Erectile dysfunction, unspecified erectile dysfunction type             Plan:    Reviewed and interpreted patient's PSA level    Discussed with patient the impact of elevated blood sugars on urine physiology    Will check patient's PSA level today   Counseled patient that improving his blood sugars can also improve his erections  RTC in     31 minutes total spent on patient's care today; >50% time spent on counseling/coordination of care

## 2024-04-29 ENCOUNTER — LAB (OUTPATIENT)
Dept: LAB | Facility: LAB | Age: 64
End: 2024-04-29
Payer: COMMERCIAL

## 2024-04-29 ENCOUNTER — OFFICE VISIT (OUTPATIENT)
Dept: UROLOGY | Facility: HOSPITAL | Age: 64
End: 2024-04-29
Payer: COMMERCIAL

## 2024-04-29 DIAGNOSIS — R97.20 ELEVATED PSA: ICD-10-CM

## 2024-04-29 DIAGNOSIS — C61 PROSTATE CANCER (MULTI): ICD-10-CM

## 2024-04-29 DIAGNOSIS — N52.9 ERECTILE DYSFUNCTION, UNSPECIFIED ERECTILE DYSFUNCTION TYPE: ICD-10-CM

## 2024-04-29 DIAGNOSIS — C61 MALIGNANT NEOPLASM OF PROSTATE (MULTI): ICD-10-CM

## 2024-04-29 DIAGNOSIS — R31.0 GROSS HEMATURIA: Primary | ICD-10-CM

## 2024-04-29 LAB — PSA SERPL-MCNC: <0.1 NG/ML

## 2024-04-29 PROCEDURE — 3051F HG A1C>EQUAL 7.0%<8.0%: CPT | Performed by: STUDENT IN AN ORGANIZED HEALTH CARE EDUCATION/TRAINING PROGRAM

## 2024-04-29 PROCEDURE — 99214 OFFICE O/P EST MOD 30 MIN: CPT | Performed by: STUDENT IN AN ORGANIZED HEALTH CARE EDUCATION/TRAINING PROGRAM

## 2024-04-29 PROCEDURE — G2211 COMPLEX E/M VISIT ADD ON: HCPCS | Performed by: STUDENT IN AN ORGANIZED HEALTH CARE EDUCATION/TRAINING PROGRAM

## 2024-04-29 PROCEDURE — 84153 ASSAY OF PSA TOTAL: CPT

## 2024-04-29 PROCEDURE — 36415 COLL VENOUS BLD VENIPUNCTURE: CPT

## 2024-04-29 PROCEDURE — 4010F ACE/ARB THERAPY RXD/TAKEN: CPT | Performed by: STUDENT IN AN ORGANIZED HEALTH CARE EDUCATION/TRAINING PROGRAM

## 2024-05-28 DIAGNOSIS — E66.01 TYPE 2 DIABETES MELLITUS WITH MORBID OBESITY (MULTI): Primary | ICD-10-CM

## 2024-05-28 DIAGNOSIS — E11.69 TYPE 2 DIABETES MELLITUS WITH MORBID OBESITY (MULTI): Primary | ICD-10-CM

## 2024-05-28 RX ORDER — FLASH GLUCOSE SENSOR
KIT MISCELLANEOUS
Qty: 6 EACH | Refills: 3 | Status: SHIPPED | OUTPATIENT
Start: 2024-05-28

## 2024-06-06 ENCOUNTER — LAB (OUTPATIENT)
Dept: LAB | Facility: LAB | Age: 64
End: 2024-06-06
Payer: COMMERCIAL

## 2024-06-06 ENCOUNTER — OFFICE VISIT (OUTPATIENT)
Dept: ENDOCRINOLOGY | Facility: CLINIC | Age: 64
End: 2024-06-06
Payer: COMMERCIAL

## 2024-06-06 VITALS
HEART RATE: 76 BPM | DIASTOLIC BLOOD PRESSURE: 72 MMHG | HEIGHT: 64 IN | RESPIRATION RATE: 16 BRPM | SYSTOLIC BLOOD PRESSURE: 124 MMHG | WEIGHT: 188.4 LBS | BODY MASS INDEX: 32.17 KG/M2

## 2024-06-06 DIAGNOSIS — E78.2 MIXED HYPERLIPIDEMIA: ICD-10-CM

## 2024-06-06 DIAGNOSIS — E66.01 TYPE 2 DIABETES MELLITUS WITH MORBID OBESITY (MULTI): ICD-10-CM

## 2024-06-06 DIAGNOSIS — E11.69 TYPE 2 DIABETES MELLITUS WITH MORBID OBESITY (MULTI): ICD-10-CM

## 2024-06-06 DIAGNOSIS — E66.01 TYPE 2 DIABETES MELLITUS WITH MORBID OBESITY (MULTI): Primary | ICD-10-CM

## 2024-06-06 DIAGNOSIS — E11.69 TYPE 2 DIABETES MELLITUS WITH MORBID OBESITY (MULTI): Primary | ICD-10-CM

## 2024-06-06 DIAGNOSIS — I10 HYPERTENSION, UNSPECIFIED TYPE: ICD-10-CM

## 2024-06-06 LAB
ALBUMIN SERPL BCP-MCNC: 4.2 G/DL (ref 3.4–5)
ALP SERPL-CCNC: 85 U/L (ref 33–136)
ALT SERPL W P-5'-P-CCNC: 10 U/L (ref 10–52)
ANION GAP SERPL CALC-SCNC: 14 MMOL/L (ref 10–20)
AST SERPL W P-5'-P-CCNC: 12 U/L (ref 9–39)
BILIRUB SERPL-MCNC: 0.7 MG/DL (ref 0–1.2)
BUN SERPL-MCNC: 16 MG/DL (ref 6–23)
CALCIUM SERPL-MCNC: 9.1 MG/DL (ref 8.6–10.6)
CHLORIDE SERPL-SCNC: 102 MMOL/L (ref 98–107)
CHOLEST SERPL-MCNC: 121 MG/DL (ref 0–199)
CHOLESTEROL/HDL RATIO: 3.1
CO2 SERPL-SCNC: 27 MMOL/L (ref 21–32)
CREAT SERPL-MCNC: 0.96 MG/DL (ref 0.5–1.3)
CREAT UR-MCNC: 99.5 MG/DL (ref 20–370)
EGFRCR SERPLBLD CKD-EPI 2021: 90 ML/MIN/1.73M*2
ERYTHROCYTE [DISTWIDTH] IN BLOOD BY AUTOMATED COUNT: 13.3 % (ref 11.5–14.5)
EST. AVERAGE GLUCOSE BLD GHB EST-MCNC: 197 MG/DL
GLUCOSE SERPL-MCNC: 149 MG/DL (ref 74–99)
HBA1C MFR BLD: 8.5 %
HCT VFR BLD AUTO: 50.4 % (ref 41–52)
HDLC SERPL-MCNC: 38.7 MG/DL
HGB BLD-MCNC: 15 G/DL (ref 13.5–17.5)
LDLC SERPL CALC-MCNC: 35 MG/DL
MCH RBC QN AUTO: 27.4 PG (ref 26–34)
MCHC RBC AUTO-ENTMCNC: 29.8 G/DL (ref 32–36)
MCV RBC AUTO: 92 FL (ref 80–100)
MICROALBUMIN UR-MCNC: 15.7 MG/L
MICROALBUMIN/CREAT UR: 15.8 UG/MG CREAT
NON HDL CHOLESTEROL: 82 MG/DL (ref 0–149)
NRBC BLD-RTO: 0 /100 WBCS (ref 0–0)
PLATELET # BLD AUTO: 213 X10*3/UL (ref 150–450)
POTASSIUM SERPL-SCNC: 4.5 MMOL/L (ref 3.5–5.3)
PROT SERPL-MCNC: 7 G/DL (ref 6.4–8.2)
RBC # BLD AUTO: 5.48 X10*6/UL (ref 4.5–5.9)
SODIUM SERPL-SCNC: 138 MMOL/L (ref 136–145)
TRIGL SERPL-MCNC: 237 MG/DL (ref 0–149)
VLDL: 47 MG/DL (ref 0–40)
WBC # BLD AUTO: 5.9 X10*3/UL (ref 4.4–11.3)

## 2024-06-06 PROCEDURE — 4010F ACE/ARB THERAPY RXD/TAKEN: CPT | Performed by: INTERNAL MEDICINE

## 2024-06-06 PROCEDURE — 80053 COMPREHEN METABOLIC PANEL: CPT

## 2024-06-06 PROCEDURE — 99214 OFFICE O/P EST MOD 30 MIN: CPT | Performed by: INTERNAL MEDICINE

## 2024-06-06 PROCEDURE — 80061 LIPID PANEL: CPT

## 2024-06-06 PROCEDURE — 36415 COLL VENOUS BLD VENIPUNCTURE: CPT

## 2024-06-06 PROCEDURE — 3051F HG A1C>EQUAL 7.0%<8.0%: CPT | Performed by: INTERNAL MEDICINE

## 2024-06-06 PROCEDURE — 85027 COMPLETE CBC AUTOMATED: CPT

## 2024-06-06 PROCEDURE — 3078F DIAST BP <80 MM HG: CPT | Performed by: INTERNAL MEDICINE

## 2024-06-06 PROCEDURE — 82043 UR ALBUMIN QUANTITATIVE: CPT

## 2024-06-06 PROCEDURE — 82570 ASSAY OF URINE CREATININE: CPT

## 2024-06-06 PROCEDURE — 3074F SYST BP LT 130 MM HG: CPT | Performed by: INTERNAL MEDICINE

## 2024-06-06 PROCEDURE — 83036 HEMOGLOBIN GLYCOSYLATED A1C: CPT

## 2024-06-06 PROCEDURE — 1036F TOBACCO NON-USER: CPT | Performed by: INTERNAL MEDICINE

## 2024-06-06 RX ORDER — TAMSULOSIN HYDROCHLORIDE 0.4 MG/1
0.4 CAPSULE ORAL DAILY
Qty: 90 CAPSULE | Refills: 2 | Status: CANCELLED | OUTPATIENT
Start: 2024-06-06

## 2024-06-06 RX ORDER — TAMSULOSIN HYDROCHLORIDE 0.4 MG/1
0.4 CAPSULE ORAL DAILY
COMMUNITY

## 2024-06-06 RX ORDER — SEMAGLUTIDE 2.68 MG/ML
2 INJECTION, SOLUTION SUBCUTANEOUS WEEKLY
Qty: 9 ML | Refills: 3 | Status: SHIPPED | OUTPATIENT
Start: 2024-06-06 | End: 2025-06-06

## 2024-06-06 ASSESSMENT — ENCOUNTER SYMPTOMS
DIARRHEA: 0
VOMITING: 0
CHILLS: 0
SHORTNESS OF BREATH: 0
NAUSEA: 0
LIGHT-HEADEDNESS: 0
DIZZINESS: 0
FEVER: 0

## 2024-06-06 NOTE — PATIENT INSTRUCTIONS
Increase ozempic dose to 2 mg  Work on daily schedule as discussed  Labs today  Follow up in 3-4 months

## 2024-06-06 NOTE — PROGRESS NOTES
Endocrinology: Follow up visit  Subjective   Patient ID: Ko Porter is a 63 y.o. male who presents for Diabetes (Type 2 ), Hyperlipidemia, and Hypertension.    PCP: Enmanuel Lui MD    HPI  Since last visit back on track with basal insulin and ozempic ( had stopped both last time)  sugars much improved on fiona but still spiking in late am: delaying breakfast due to upcoming move and very busy.    Basal 20  Meal 24/24  Ozempic 1 mg    Review of Systems   Constitutional:  Negative for chills and fever.   Respiratory:  Negative for shortness of breath.    Gastrointestinal:  Negative for diarrhea, nausea and vomiting.   Endocrine: Negative for cold intolerance and heat intolerance.   Neurological:  Negative for dizziness and light-headedness.       Patient Active Problem List   Diagnosis    Age-related nuclear cataract of left eye    Chalazion of right lower eyelid    Chalazion of right upper eyelid    Elevated PSA    Enlarged prostate with lower urinary tract symptoms (LUTS)    Erectile dysfunction    Gross hematuria    Hyperlipidemia    Hypertension    Hypogonadism male    Impacted cerumen of left ear    Ingrowing toenail    Meibomian gland dysfunction (MGD)    Obesity    S/P cataract surgery    Phimosis    LEO (obstructive sleep apnea)    Type 2 diabetes mellitus with morbid obesity (Multi)    Urinary incontinence    Vitamin D insufficiency    Cataract, right    Cortical cataract    Vitreous floaters of right eye    Prostate cancer (Multi)    Elevated coronary artery calcium score    Coronary artery calcification    Difficult intubation    Malignant neoplasm of prostate (Multi)    Healthcare maintenance    Type 2 diabetes mellitus with hyperlipidemia (Multi)    Coronary artery disease involving native coronary artery of native heart without angina pectoris    Colon cancer screening        Home Meds:  Current Outpatient Medications   Medication Instructions    acetaminophen (TYLENOL) 650 mg, oral, Every 6  "hours PRN    aspirin 81 mg EC tablet 1 tablet, oral, Daily    atorvastatin (LIPITOR) 10 mg, oral, Daily    BD Belkys 2nd Gen Pen Needle 32 gauge x 5/32\" needle Use with insulin 3x a day    empagliflozin (Jardiance) 10 mg 1 tablet, oral, Daily    flash glucose sensor kit (FreeStyle Reg 2 Sensor) kit USE FOR 14 DAYS AND REPLACE.    insulin aspart (NovoLOG FlexPen U-100 Insulin) 100 unit/mL (3 mL) pen subcutaneous    insulin glargine (LANTUS) 20 Units, subcutaneous, Daily, Take as directed per insulin instructions.    lisinopril 20 mg, oral, Daily    metFORMIN (GLUCOPHAGE) 1,000 mg, oral, 2 times daily    Ozempic 0.5 mg, subcutaneous, Every 7 days    pen needle, diabetic (BD Belkys 2nd Gen Pen Needle) 32 gauge x 5/32\" needle miscellaneous    semaglutide (Ozempic) 2 mg/dose (8 mg/3 mL) pen injector subcutaneous    tadalafil (CIALIS) 20 mg, oral, Daily PRN    tamsulosin (FLOMAX) 0.4 mg, oral, Daily        No Known Allergies     Objective   Vitals:    06/06/24 0944   BP: 124/72   Pulse: 76   Resp: 16      Vitals:    06/06/24 0944   Weight: 85.5 kg (188 lb 6.4 oz)      Body mass index is 32.34 kg/m².   Physical Exam  Constitutional:       Appearance: Normal appearance. He is overweight.   HENT:      Head: Normocephalic and atraumatic.   Neck:      Thyroid: No thyroid mass, thyromegaly or thyroid tenderness.   Cardiovascular:      Rate and Rhythm: Normal rate and regular rhythm.      Heart sounds: No murmur heard.     No gallop.   Pulmonary:      Effort: Pulmonary effort is normal.      Breath sounds: Normal breath sounds.   Abdominal:      Palpations: Abdomen is soft.      Comments: benign   Neurological:      General: No focal deficit present.      Mental Status: He is alert and oriented to person, place, and time.      Deep Tendon Reflexes: Reflexes are normal and symmetric.   Psychiatric:         Behavior: Behavior is cooperative.         Labs:  Lab Results   Component Value Date    HGBA1C 7.3 (H) 03/05/2024      No " "results found for: \"PR1\", \"THYROIDPAB\", \"TSI\"     Assessment/Plan   Problem List Items Addressed This Visit       Hyperlipidemia    Hypertension    Type 2 diabetes mellitus with morbid obesity (Multi) - Primary    Relevant Orders    CBC    Comprehensive Metabolic Panel    Lipid Panel    Hemoglobin A1C    Albumin , Urine Random   Dm2:  Sugars overall are much improved  Will increase ozempic to 2 mg  Discussed eating habits: try to start breakfast earlier   No change in insulin for now  Htn:  Bp excellent  lipiDS;  Due for recheck today      Electronically signed by:  Deloris Freeman MD 06/06/24 10:09 AM              "

## 2024-06-21 ENCOUNTER — OFFICE VISIT (OUTPATIENT)
Dept: SLEEP MEDICINE | Facility: CLINIC | Age: 64
End: 2024-06-21
Payer: COMMERCIAL

## 2024-06-21 VITALS
WEIGHT: 185.3 LBS | HEIGHT: 64 IN | SYSTOLIC BLOOD PRESSURE: 118 MMHG | BODY MASS INDEX: 31.63 KG/M2 | OXYGEN SATURATION: 98 % | DIASTOLIC BLOOD PRESSURE: 70 MMHG | HEART RATE: 70 BPM

## 2024-06-21 DIAGNOSIS — G47.33 OSA (OBSTRUCTIVE SLEEP APNEA): Primary | ICD-10-CM

## 2024-06-21 PROCEDURE — 3078F DIAST BP <80 MM HG: CPT | Performed by: NURSE PRACTITIONER

## 2024-06-21 PROCEDURE — 4010F ACE/ARB THERAPY RXD/TAKEN: CPT | Performed by: NURSE PRACTITIONER

## 2024-06-21 PROCEDURE — 3074F SYST BP LT 130 MM HG: CPT | Performed by: NURSE PRACTITIONER

## 2024-06-21 PROCEDURE — 99214 OFFICE O/P EST MOD 30 MIN: CPT | Performed by: NURSE PRACTITIONER

## 2024-06-21 PROCEDURE — 3061F NEG MICROALBUMINURIA REV: CPT | Performed by: NURSE PRACTITIONER

## 2024-06-21 PROCEDURE — 1036F TOBACCO NON-USER: CPT | Performed by: NURSE PRACTITIONER

## 2024-06-21 PROCEDURE — 3052F HG A1C>EQUAL 8.0%<EQUAL 9.0%: CPT | Performed by: NURSE PRACTITIONER

## 2024-06-21 PROCEDURE — G2211 COMPLEX E/M VISIT ADD ON: HCPCS | Performed by: NURSE PRACTITIONER

## 2024-06-21 PROCEDURE — 3048F LDL-C <100 MG/DL: CPT | Performed by: NURSE PRACTITIONER

## 2024-06-21 ASSESSMENT — PATIENT HEALTH QUESTIONNAIRE - PHQ9
1. LITTLE INTEREST OR PLEASURE IN DOING THINGS: NOT AT ALL
SUM OF ALL RESPONSES TO PHQ9 QUESTIONS 1 AND 2: 0
2. FEELING DOWN, DEPRESSED OR HOPELESS: NOT AT ALL

## 2024-06-21 ASSESSMENT — SLEEP AND FATIGUE QUESTIONNAIRES
WORRIED_DISTRESSED_DUE_TO_SLEEP: A LITTLE
HOW LIKELY ARE YOU TO NOD OFF OR FALL ASLEEP WHILE WATCHING TV: MODERATE CHANCE OF DOZING
HOW LIKELY ARE YOU TO NOD OFF OR FALL ASLEEP WHILE SITTING QUIETLY AFTER LUNCH WITHOUT ALCOHOL: MODERATE CHANCE OF DOZING
HOW LIKELY ARE YOU TO NOD OFF OR FALL ASLEEP WHEN YOU ARE A PASSENGER IN A CAR FOR AN HOUR WITHOUT A BREAK: SLIGHT CHANCE OF DOZING
HOW LIKELY ARE YOU TO NOD OFF OR FALL ASLEEP IN A CAR, WHILE STOPPED FOR A FEW MINUTES IN TRAFFIC: MODERATE CHANCE OF DOZING
LYING DOWN TO REST OR NAP IN THE AFTERNOON: 3
SITTING IN A CLASSROOM AT SCHOOL DURING THE MORNING: 2
SITTING AND WATCHING TV OR A VIDEO: 2
HOW LIKELY ARE YOU TO NOD OFF OR FALL ASLEEP WHILE SITTING AND READING: MODERATE CHANCE OF DOZING
SITTING AND RIDING IN A CAR OR BUS FOR ABOUT HALF AN HOUR: 1
SITTING AND TALKING TO SOMEONE: 1
SITTING QUITELY BY YOURSELF AFTER LUNCH: 2
SITTING AND READING: 2
ESS-CHAD TOTAL SCORE: 15
SITING INACTIVE IN A PUBLIC PLACE LIKE A CLASS ROOM OR A MOVIE THEATER: MODERATE CHANCE OF DOZING
SLEEP_PROBLEM_INTERFERES_DAILY_ACTIVITIES: SOMEWHAT
SLEEP_PROBLEM_NOTICEABLE_TO_OTHERS: A LITTLE
DIFFICULTY_STAYING_ASLEEP: MILD
DIFFICULTY_FALLING_ASLEEP: MILD
ESS-CHAD TOTAL SCORE: 15
SATISFACTION_WITH_CURRENT_SLEEP_PATTERN: SATISFIED
SITTING AND EATING A MEAL: 2
HOW LIKELY ARE YOU TO NOD OFF OR FALL ASLEEP WHILE LYING DOWN TO REST IN THE AFTERNOON WHEN CIRCUMSTANCES PERMIT: HIGH CHANCE OF DOZING
WAKING_TOO_EARLY: MODERATE
HOW LIKELY ARE YOU TO NOD OFF OR FALL ASLEEP WHILE SITTING AND TALKING TO SOMEONE: SLIGHT CHANCE OF DOZING

## 2024-06-21 ASSESSMENT — COLUMBIA-SUICIDE SEVERITY RATING SCALE - C-SSRS
6. HAVE YOU EVER DONE ANYTHING, STARTED TO DO ANYTHING, OR PREPARED TO DO ANYTHING TO END YOUR LIFE?: NO
1. IN THE PAST MONTH, HAVE YOU WISHED YOU WERE DEAD OR WISHED YOU COULD GO TO SLEEP AND NOT WAKE UP?: NO
2. HAVE YOU ACTUALLY HAD ANY THOUGHTS OF KILLING YOURSELF?: NO

## 2024-06-21 ASSESSMENT — ENCOUNTER SYMPTOMS
OCCASIONAL FEELINGS OF UNSTEADINESS: 0
DEPRESSION: 0
LOSS OF SENSATION IN FEET: 0

## 2024-06-21 ASSESSMENT — PAIN SCALES - GENERAL: PAINLEVEL: 0-NO PAIN

## 2024-06-21 NOTE — ASSESSMENT & PLAN NOTE
Reviewed HSAT results in detail, supports mild sleep apnea with an RADHA 3% of 14. Predominately supine sleep on night of the testing, minimal time in non-supine sleep  -Ko prefers to work on weight loss with Ozempic and implement side sleeping for the time being  -Discussed option to start CPAP at later date if needed. Recommended considering restarting if weight loss does not occur, snoring continues or worsens, daytime sleepiness does not improve, and nocturia does not less. He verbalized understanding.  -Plan for follow up in 6 mo to monitor weight loss and symptoms. He is agreeable.

## 2024-06-21 NOTE — PATIENT INSTRUCTIONS
[facility] Sleep Medicine  [facility] [address]  [address] [address]       NAME: Ko Porter   DATE:  06/21/24    DIAGNOSIS:   No diagnosis found.    Thank you for coming to the Sleep Medicine Clinic today! Your sleep medicine provider today was: JOSÉ Saha Below is a summary of your treatment plan, other important information, and our contact numbers:    TREATMENT PLAN:   - Follow-up in 6 months.  - If not already done, sign up for 'My Chart' and send prescription requests or messages through this      Obstructive sleep apnea (LEO): LEO is a sleep disorder where your upper airway muscles relax during sleep and the airway intermittently and repetitively narrows and collapses leading to blocked airway (apnea) which, in turn, can disrupt breathing in sleep, lower oxygen levels while you sleep and cause night time wakings. Because apnea may cause higher carbon dioxide or low oxygen levels, untreated LEO can lead to heart arrhythmia, elevation of blood pressure, and make it harder for the body to consolidate memory and metabolize (leading to higher blood sugars at night).   Frequent partial arousals occur during sleep resulting in sleep deprivation and daytime sleepiness. LEO is associated with an increased risk of cardiovascular disease, stroke, hypertension, and insulin resistance. Moreover, untreated LEO with excessive daytime sleepiness can increase the risk of motor vehicular accidents.    Some conservative strategies for LEO are:   Positional therapy - Avoid sleeping on your back.   Healthy diet, exercise, and optimizing weight encouraged.   Avoid alcohol late in the evening as it can make sleep apnea worse.     Safety: Avoid driving and operating heavy equipment while sleepy. Drowsy driving may lead to life-threatening motor vehicle accidents.     Common treatment options for sleep apnea include weight management,  positional therapy, Positive Airway Therapy (PAP) therapy, oral appliance therapy, hypoglossal nerve stimulation, and select airway surgeries.     Instructions - Common LEO Recs: - For your sleep apnea, continue to use your PAP every night and use it whenever you are sleeping.   - Avoid alcohol or sedatives several hours prior to sleeping.   - Get additional supplies for your PAP (e.g., mask, hose, filters) every 3 months or as your insurance allows from your DME company. Replacement cushions for your PAP mask can be requested monthly if airseals are an issue.  - Remember to clean your mask, tubings, and water chamber regularly as instructed.  - Avoid driving or operating heavy machinery when drowsy. A person driving while sleepy is five (5) times more likely to have an accident. If you feel sleepy, pull over and take a short power nap (sleep for less than 30 minutes). Otherwise, ask somebody to drive you.    EASY WAYS TO IMPROVE YOUR SLEEP:  1. Go to bed and wake up at the same time every day.   Aim for 8 hours but some people need less, some need more.   Get out of bed if you are not sleeping.   Limit naps to 20 min or less.   2. Expose yourself to daylight and/ or bright light in the morning.   Go outside or spend time near a window each morning.   You can use a light box (found on [employer]) if you wake before the sunrise.   Limit light exposure in the evenings (including electronic usage).   Try meditation, reading, stretching, deep breathing, warm shower or bath, or yoga nidra as part of your bedtime routine. There are many great FREE, videos or audio tracks on [employer]/ [employer], etc for guidance.  3. Exercise, in some form, EVERY day, but not too close to bedtime. Consider making this part of your routine at the start of your day, followed by a cool shower.  4. Eat meals at roughly the same time every day. Make sure you are prioritizing fruits, vegetables, whole grains, lean proteins.  5. Time your caffeine  intake. Make sure you are not drinking caffeine within 8 to 12 hours prior to your bedtime.   6. Avoid marijuana, alcohol, and nicotine. They will reduce sleep quality in any quantity.  7. Learn to manage anxiety. Psychology services at  can be reached at [phone] to schedule an appointment.     IMPORTANT INFORMATION:     Call 911 for medical emergencies.  Our offices are generally open from Monday-Friday, 9 am - 5 pm.  If you need to get in touch with me, you may either call me and my team(number is below) or you can use [facility].  If a referral for a test, for CPAP, or for another specialist was made, and you have not heard about scheduling this within a week, please call scheduling at [phone]).  If you are unable to make your appointment for clinic or an overnight study, kindly call the office at least 48 hours in advance to cancel and reschedule.  If you are on CPAP, please bring your device's card to each clinic appointment unless told otherwise by your provider.  There are no supporting services by either the sleep doctors or their staff on weekends and Holidays, or after 5 PM on weekdays.   If you have been asked to come to a sleep study, make sure you bring toiletries, a comfy pillow, and any nighttime medications that you may regularly take. Also be sure to eat dinner before you arrive. We generally do not provide meals.      PRESCRIPTIONS:  We require 7 days advanced notice for prescription refills. If we do not receive the request in this time, we cannot guarantee that your medication will be refilled in time. Please contact the sleep nurses listed below for refills or request via [facility].     IMPORTANT PHONE NUMBERS:   Sleep Medicine Clinic Fax: [phone]  Appointments (for Pediatric Sleep Clinic): [phone] - option 1  Appointments (for Adult Sleep Clinic): [phone] - option 2  Appointments (For Sleep Studies): [phone] - option 3  Behavioral Sleep  Medicine: [phone]  Sleep Surgery: [phone]  ENT (Otolaryngology): [phone]  Headache Clinic (Neurology): [phone]  Neurology: [phone]  Psychiatry: [phone]  Pulmonary Function Testing (PFT) Center: [phone]  Pulmonary Medicine: [phone]  [place] (DME): [phone]  [employer] [facility] (DME): [phone]  [facility] ([facility]): [phone]-[phone]    Our Adult Sleep Medicine Team (Please do not hesitate to call the office or sleep nurse with any questions between appointments):    Adult Sleep Nurses (Jaki Herrera, SHELLY and Tamiko Titus RN):  For clinical questions and refilling prescriptions: [phone]  Email sleep diaries and other documents at: [email or web address]    Adult Sleep Medicine Secretaries:  Sofi Parson (For Gillian/Bueno/Kremma/Luis A/Yeh):   P: [phone]  F: [phone]  Genoveva Helm (For [place]): P: [phone]  Fax: [phone]  Renita Marie (For Sacha/Blank): P: [phone]  F: [phone]  Marisel Li (For [place]): P: [phone]  F: [phone]  Heidi Coley (For Marly/Ivan/Zakhary): P: [phone]  F: [phone]  Freda Woody (For Familia/Chris): P: [phone]  F: [phone]     Adult Sleep Medicine Advanced Practice Providers:  Jai Gonzales ([place], [place])  Sandra Love ([facility])  Gloria Pierson CNP ([place], [place], [place])  Mary Winslow CNP ([place], [place], [place])  Fatemeh Chase ([place], Amanda Mcdonald Kuei-Mei CNP ([place])      Our Sleep Testing Center (STC) Locations:  Our team will contact you to schedule your sleep study, however, you can contact us as follow:  Main Phone Line (scheduling only): [phone]), option 3  Adult and Pediatric Locations  [facility] (6 years and older): [facility] - 4th floor ([address] [place] [address]) After hours line: [phone]  [facility]  "[facility] (Main campus: All ages): [facility], 6th floor. After hours line: [phone]   [place] (5 years and older; younger considered on case-by-case basis): [address]; [address], [address]. Scheduling  After hours line: [phone]   [place] (6 years and older): [address]; [address]; [address]   [place] (6 years and older): [address] [facility] [address]  After hours line: [phone]   [place] (13 years and older) in [place]: [address], 2nd floor  After hours line: [phone]   [place] (13 year and older): [address]  After hours line: [phone] (Home studies out of [facility])    Adult Only Locations:   [place] (18 years and older): [address] [facility], 2nd floor   [place] (18 years and older): [address] [facility]; 4th floor  After hours line: [phone]  [facility] (18 years and older) at [place]: [address] [place] [address]  After hours line: [phone]        CONTACTING YOUR SLEEP MEDICINE PROVIDER:  Send a message directly to your provider through \"My Chart\", which is the email service through your  Records Account: [email or web address]   Call [phone] and leave a message. One of the administrative assistants will forward the message to your sleep medicine provider through \"My Chart\" and/or email.     Your sleep medicine provider for this visit was: Gloria Pierson, EFRA-CNP    In the event that you are running more than 15 minutes late to your appointment, I will kindly ask you to reschedule.       "

## 2024-06-21 NOTE — PROGRESS NOTES
Patient: Juan Porter    [id number]  : 1960 -- AGE 63 y.o.    Provider: JOSÉ Saha     Location [facility]   Service Date: 2024              [facility] Medicine Clinic  Followup Visit Note    HISTORY OF PRESENT ILLNESS       HISTORY OF PRESENT ILLNESS   Juan Porter is a 63 y.o. male with past medical history of anxiety, obesity, cataract, CAD, DM2, ED, BPH, hyperlipidemia, HTN, obesity, LEO who presents to a [facility] Sleep Medicine Clinic for followup. JUAN is a . He works second shift. He is accompanied by his wife today in clinic.     PAST SLEEP HISTORY    JUAN has had a sleep study completed in 2015 showing no significant sleep apnea with an AHI of 1.3. Arousal index of 11.1. The SpO2 rosie of 80%. The PLM index was 0. The supine REM AHI was 64.4. Recommendation was for PAP titration.   PAP titration was completed on 2016. CPAP was titrated from 5 to 10. Recommendation was for auto PAP 8-20.    Home sleep study was completed in 2024 showing mild sleep apnea with an RDI 3% of 14.4, RADHA 4% of 5.4 and SpO2 rosie of 89%.  Recommendation is to consider treatment for sleep apnea.     CURRENT SLEEP HISTORY    On today's visit, the patient reports stopping Ozempic after prostate surgery. Restarted recetnly and back to his max dose. Hoping to lose weight  Wife notes his snores at times when he is extra tired. She notes it happens more on his back v his side. He prefers to side sleep  Notes he feels sleepy at times- attributes to work schedule. Does not always fall asleep quickly when he gets home, sometimes has chores to do, watches TV for a while.   Wakes around 6 or 7 to use the restroom and often cannot get back to sleep. Does not nap later in the day.   Has old CPAP. Did not register with "[employer]. Notes it is currently packed up as they are moving     RLS Followup:  denies    Insomnia follow  "up:  Bedtime: 1 AM  Sleep Latency: right away  Awakenings: 1x per night bathroom around 6 AM  Wake time: 6 or 7 AM  Naps:   none    ESS: 15   MAKENZIE: 10  FOSQ: 35    REVIEW OF SYSTEMS     REVIEW OF SYSTEMS  Review of Systems   All other systems reviewed and are negative.      ALLERGIES AND MEDICATIONS     ALLERGIES  No Known Allergies    MEDICATIONS  Current Outpatient Medications   Medication Sig Dispense Refill    aspirin 81 mg EC tablet Take 1 tablet (81 mg) by mouth once daily.      atorvastatin (Lipitor) 10 mg tablet Take 1 tablet (10 mg) by mouth once daily. 90 tablet 1    BD Belkys 2nd Gen Pen Needle 32 gauge x 5/32\" needle Use with insulin 3x a day 300 each 3    empagliflozin (Jardiance) 10 mg Take 1 tablet (10 mg) by mouth once daily.      insulin aspart (NovoLOG FlexPen U-100 Insulin) 100 unit/mL (3 mL) pen Inject under the skin.      insulin glargine (Lantus) 100 unit/mL (3 mL) pen Inject 20 Units under the skin once daily. Take as directed per insulin instructions. 18 mL 3    lisinopril 20 mg tablet Take 1 tablet (20 mg) by mouth once daily. 90 tablet 1    metFORMIN (Glucophage) 1,000 mg tablet TAKE 1 TABLET TWICE A  tablet 3    pen needle, diabetic (BD Belkys 2nd Gen Pen Needle) 32 gauge x 5/32\" needle       semaglutide (Ozempic) 2 mg/dose (8 mg/3 mL) pen injector Inject 2 mg under the skin 1 (one) time per week in the early morning.. 9 mL 3    tadalafil (Cialis) 20 mg tablet Take 1 tablet (20 mg) by mouth once daily as needed for erectile dysfunction. 30 tablet 3    tamsulosin (Flomax) 0.4 mg 24 hr capsule Take 1 capsule (0.4 mg) by mouth once daily.       No current facility-administered medications for this visit.       PPAST MEDICAL HISTORY  Past Medical History:   Diagnosis Date    Achilles tendinitis 06/16/2023    Anxiety 06/16/2023    BPH (benign prostatic hyperplasia)     Diabetes mellitus (Multi) 06/16/2023    A1C= 6.9% on 11/6/23    ED (erectile dysfunction)     GERD (gastroesophageal reflux " "disease)     Hyperlipidemia     Hypertension     LEO (obstructive sleep apnea)     Prostate CA (Multi)        PAST SURGICAL HISTORY:  Past Surgical History:   Procedure Laterality Date    APPENDECTOMY  01/22/2016    Appendectomy    COLONOSCOPY  10/02/2013    Complete Colonoscopy    OTHER SURGICAL HISTORY Left 10/30/2020    Cataract surgery    PROSTATECTOMY         FAMILY HISTORY  Family History   Problem Relation Name Age of Onset    Diabetes Father      Cancer Maternal Grandfather         FAMILY HISTORY: No changes since previous visit. Otherwise non-contributory as charted.       SOCIAL HISTORY  He  reports that he has never smoked. He has never used smokeless tobacco. He reports that he does not currently use alcohol. He reports that he does not use drugs.       PHYSICAL EXAM     VITAL SIGNS: /70 (BP Location: Left arm, Patient Position: Sitting, BP Cuff Size: Adult)   Pulse 70   Ht 1.626 m (5' 4\")   Wt 84.1 kg (185 lb 4.8 oz)   SpO2 98%   BMI 31.81 kg/m²      PREVIOUS WEIGHTS:  Wt Readings from Last 3 Encounters:   06/21/24 84.1 kg (185 lb 4.8 oz)   06/06/24 85.5 kg (188 lb 6.4 oz)   03/05/24 79.7 kg (175 lb 12.8 oz)       Physical Exam  Physical Exam   Constitutional: Alert and oriented, cooperative, no obvious distress   HEENT: Non icteric or anemic, EOM WNL bilaterally   Neck: Supple, no JVD, no goiter, no adenopathy, no rigidity  Chest: CTA bilaterally, no wheezing, crackles, rubs   Cardiac: RRR, S1 and S2, no murmur, rub, thrill   Abdomen: Obese, Soft, nontender, no masses, no organomegaly   Extremities: No clubbing, no LL edema   Neuromuscular: Cranial nerves grossly intact, no focal deficits      RESULTS/DATA     Bicarbonate (mmol/L)   Date Value   06/06/2024 27   03/05/2024 27   01/09/2024 27       PAP Adherence:  DURABLE MEDICAL EQUIPMENT COMPANY: [facility]  Machine: THERAPY: [email or web address]     Auto 8-20 flex 2  Set up in Feb 2016  No usage in ~3 " years    ASSESSMENT/PLAN     Mr. Porter is a 63 y.o. male and He returns in followup to the [facility] for LEO.    Problem List and Orders  Problem List Items Addressed This Visit             ICD-10-CM    LEO (obstructive sleep apnea) - Primary G47.33     Reviewed HSAT results in detail, supports mild sleep apnea with an RADHA 3% of 14. Predominately supine sleep on night of the testing, minimal time in non-supine sleep  -Ko prefers to work on weight loss with Ozempic and implement side sleeping for the time being  -Discussed option to start CPAP at later date if needed. Recommended considering restarting if weight loss does not occur, snoring continues or worsens, daytime sleepiness does not improve, and nocturia does not less. He verbalized understanding.  -Plan for follow up in 6 mo to monitor weight loss and symptoms. He is agreeable.            Disposition    Return to clinic in 6 months

## 2024-07-17 ENCOUNTER — APPOINTMENT (OUTPATIENT)
Dept: CARDIOLOGY | Facility: CLINIC | Age: 64
End: 2024-07-17
Payer: COMMERCIAL

## 2024-07-23 ENCOUNTER — OFFICE VISIT (OUTPATIENT)
Dept: CARDIOLOGY | Facility: CLINIC | Age: 64
End: 2024-07-23
Payer: COMMERCIAL

## 2024-07-23 VITALS
HEIGHT: 64 IN | DIASTOLIC BLOOD PRESSURE: 71 MMHG | BODY MASS INDEX: 31.4 KG/M2 | HEART RATE: 79 BPM | SYSTOLIC BLOOD PRESSURE: 113 MMHG | WEIGHT: 183.9 LBS | OXYGEN SATURATION: 97 %

## 2024-07-23 DIAGNOSIS — R93.1 ELEVATED CORONARY ARTERY CALCIUM SCORE: ICD-10-CM

## 2024-07-23 DIAGNOSIS — I10 HYPERTENSION, UNSPECIFIED TYPE: Primary | ICD-10-CM

## 2024-07-23 DIAGNOSIS — E78.2 MIXED HYPERLIPIDEMIA: ICD-10-CM

## 2024-07-23 PROCEDURE — 3061F NEG MICROALBUMINURIA REV: CPT | Performed by: INTERNAL MEDICINE

## 2024-07-23 PROCEDURE — 3078F DIAST BP <80 MM HG: CPT | Performed by: INTERNAL MEDICINE

## 2024-07-23 PROCEDURE — 3074F SYST BP LT 130 MM HG: CPT | Performed by: INTERNAL MEDICINE

## 2024-07-23 PROCEDURE — 3052F HG A1C>EQUAL 8.0%<EQUAL 9.0%: CPT | Performed by: INTERNAL MEDICINE

## 2024-07-23 PROCEDURE — 99214 OFFICE O/P EST MOD 30 MIN: CPT | Performed by: INTERNAL MEDICINE

## 2024-07-23 PROCEDURE — 1036F TOBACCO NON-USER: CPT | Performed by: INTERNAL MEDICINE

## 2024-07-23 PROCEDURE — 4010F ACE/ARB THERAPY RXD/TAKEN: CPT | Performed by: INTERNAL MEDICINE

## 2024-07-23 PROCEDURE — 3008F BODY MASS INDEX DOCD: CPT | Performed by: INTERNAL MEDICINE

## 2024-07-23 PROCEDURE — 3048F LDL-C <100 MG/DL: CPT | Performed by: INTERNAL MEDICINE

## 2024-07-23 ASSESSMENT — PATIENT HEALTH QUESTIONNAIRE - PHQ9
2. FEELING DOWN, DEPRESSED OR HOPELESS: NOT AT ALL
SUM OF ALL RESPONSES TO PHQ9 QUESTIONS 1 AND 2: 0
1. LITTLE INTEREST OR PLEASURE IN DOING THINGS: NOT AT ALL

## 2024-07-23 ASSESSMENT — COLUMBIA-SUICIDE SEVERITY RATING SCALE - C-SSRS
2. HAVE YOU ACTUALLY HAD ANY THOUGHTS OF KILLING YOURSELF?: NO
1. IN THE PAST MONTH, HAVE YOU WISHED YOU WERE DEAD OR WISHED YOU COULD GO TO SLEEP AND NOT WAKE UP?: NO
6. HAVE YOU EVER DONE ANYTHING, STARTED TO DO ANYTHING, OR PREPARED TO DO ANYTHING TO END YOUR LIFE?: NO

## 2024-07-23 ASSESSMENT — PAIN SCALES - GENERAL: PAINLEVEL: 0-NO PAIN

## 2024-07-23 NOTE — PROGRESS NOTES
Subjective   Ko Porter is a 64 y.o. male.    Past medical history  Insulin-dependent type 2 diabetes since 1987.  Patient is on insulin and Ozempic.  Diabetes better controlled than in the past.  Most recent HbA1c from June at 8.5.  Hypertension on lisinopril 20  Hyperlipidemia.  Well-managed on very low-dose atorvastatin.  Recent LDL at 35  Elevated coronary artery calcium: 321, 312 mid RCA and 10 at left main  Obesity  Obstructive sleep apnea  Erectile dysfunction  Elevated PSA    Social history: Lives with his mother and sister. 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 Do not know well father side    Chief Complaint:  No chief complaint on file.  Last visit was on 21 July 2023.  Patient was asymptomatic.  He was referred for an echocardiogram    HPI  Patient is doing well.  Compliant with his medication.  Reports no cardiopulmonary symptoms.    ROS  No significant complaints    Objective   Constitutional:       Appearance: Not in distress.   Neck:      Vascular: JVD normal.   Pulmonary:      Effort: Pulmonary effort is normal.      Breath sounds: Normal breath sounds.   Cardiovascular:      PMI at left midclavicular line. Normal rate. Regular rhythm. Normal S1. Normal S2.       Murmurs: There is no murmur.   Edema:     Peripheral edema absent.   Abdominal:      General: Bowel sounds are normal.      Palpations: Abdomen is soft.   Skin:     General: Skin is warm and dry.   Neurological:      General: No focal deficit present.      Mental Status: Alert and oriented to person, place and time.           Lab Review:   Lab Results   Component Value Date     06/06/2024    K 4.5 06/06/2024     06/06/2024    CO2 27 06/06/2024    BUN 16 06/06/2024    CREATININE 0.96 06/06/2024    GLUCOSE 149 (H) 06/06/2024    CALCIUM 9.1 06/06/2024     Lab Results   Component Value Date    WBC 5.9 06/06/2024    HGB 15.0  06/06/2024    HCT 50.4 06/06/2024    MCV 92 06/06/2024     06/06/2024     Lab Results   Component Value Date    CHOL 121 06/06/2024    TRIG 237 (H) 06/06/2024    HDL 38.7 06/06/2024   LDL 35    TTE Aug 2023  CONCLUSIONS:  1. Left ventricular systolic function is normal with a 55-60% estimated ejection fraction.  2. There is normal right ventricular global systolic function.  3. The right ventricle is normal in size.  4. The right ventricular systolic pressure is unable to be estimated.     Assessment/Plan     64-year-old gentleman with a history of uncontrolled diabetes, hypertension and hyperlipidemia along with elevated coronary artery calcium score.  Patient is here for a follow-up visit.  Echocardiogram last year was normal.    Elevated coronary artery calcium score: Continue optimization of care for diabetes.  His LDL below the target less than 50.  His blood pressure is well-managed.  Advised on continue regular exercise and eating healthy diet    Hypertension: Well-managed on very low-dose lisinopril.  Continue therapy    Hyperlipidemia: Continue with low-dose atorvastatin.  LDL is below the target range    Follow-up 1 year

## 2024-07-26 ENCOUNTER — APPOINTMENT (OUTPATIENT)
Dept: CARDIOLOGY | Facility: CLINIC | Age: 64
End: 2024-07-26
Payer: COMMERCIAL

## 2024-07-26 DIAGNOSIS — E11.69 TYPE 2 DIABETES MELLITUS WITH MORBID OBESITY (MULTI): Primary | ICD-10-CM

## 2024-07-26 DIAGNOSIS — E66.01 TYPE 2 DIABETES MELLITUS WITH MORBID OBESITY (MULTI): Primary | ICD-10-CM

## 2024-07-26 RX ORDER — FLASH GLUCOSE SENSOR
KIT MISCELLANEOUS
Qty: 6 EACH | Refills: 3 | Status: SHIPPED | OUTPATIENT
Start: 2024-07-26

## 2024-07-26 RX ORDER — BLOOD-GLUCOSE SENSOR
EACH MISCELLANEOUS
Qty: 9 EACH | Refills: 3 | Status: SHIPPED | OUTPATIENT
Start: 2024-07-26

## 2024-07-29 ENCOUNTER — APPOINTMENT (OUTPATIENT)
Dept: UROLOGY | Facility: HOSPITAL | Age: 64
End: 2024-07-29
Payer: COMMERCIAL

## 2024-08-05 ENCOUNTER — APPOINTMENT (OUTPATIENT)
Dept: UROLOGY | Facility: HOSPITAL | Age: 64
End: 2024-08-05
Payer: COMMERCIAL

## 2024-08-05 ENCOUNTER — APPOINTMENT (OUTPATIENT)
Dept: PRIMARY CARE | Facility: CLINIC | Age: 64
End: 2024-08-05
Payer: COMMERCIAL

## 2024-08-05 VITALS
RESPIRATION RATE: 12 BRPM | OXYGEN SATURATION: 95 % | HEART RATE: 88 BPM | HEIGHT: 64 IN | WEIGHT: 183 LBS | DIASTOLIC BLOOD PRESSURE: 70 MMHG | BODY MASS INDEX: 31.24 KG/M2 | SYSTOLIC BLOOD PRESSURE: 122 MMHG

## 2024-08-05 DIAGNOSIS — I10 PRIMARY HYPERTENSION: ICD-10-CM

## 2024-08-05 DIAGNOSIS — E78.2 MIXED HYPERLIPIDEMIA: ICD-10-CM

## 2024-08-05 DIAGNOSIS — E11.69 TYPE 2 DIABETES MELLITUS WITH HYPERLIPIDEMIA (MULTI): Primary | ICD-10-CM

## 2024-08-05 DIAGNOSIS — I25.10 CORONARY ARTERY DISEASE INVOLVING NATIVE CORONARY ARTERY OF NATIVE HEART WITHOUT ANGINA PECTORIS: ICD-10-CM

## 2024-08-05 DIAGNOSIS — G47.33 OSA (OBSTRUCTIVE SLEEP APNEA): ICD-10-CM

## 2024-08-05 DIAGNOSIS — C61 MALIGNANT NEOPLASM OF PROSTATE (MULTI): ICD-10-CM

## 2024-08-05 DIAGNOSIS — K21.9 GASTROESOPHAGEAL REFLUX DISEASE, UNSPECIFIED WHETHER ESOPHAGITIS PRESENT: ICD-10-CM

## 2024-08-05 DIAGNOSIS — E78.5 TYPE 2 DIABETES MELLITUS WITH HYPERLIPIDEMIA (MULTI): Primary | ICD-10-CM

## 2024-08-05 PROBLEM — E66.01 TYPE 2 DIABETES MELLITUS WITH MORBID OBESITY (MULTI): Status: RESOLVED | Noted: 2023-06-16 | Resolved: 2024-08-05

## 2024-08-05 PROCEDURE — 4010F ACE/ARB THERAPY RXD/TAKEN: CPT | Performed by: FAMILY MEDICINE

## 2024-08-05 PROCEDURE — 3061F NEG MICROALBUMINURIA REV: CPT | Performed by: FAMILY MEDICINE

## 2024-08-05 PROCEDURE — 3074F SYST BP LT 130 MM HG: CPT | Performed by: FAMILY MEDICINE

## 2024-08-05 PROCEDURE — 3078F DIAST BP <80 MM HG: CPT | Performed by: FAMILY MEDICINE

## 2024-08-05 PROCEDURE — 3008F BODY MASS INDEX DOCD: CPT | Performed by: FAMILY MEDICINE

## 2024-08-05 PROCEDURE — 3052F HG A1C>EQUAL 8.0%<EQUAL 9.0%: CPT | Performed by: FAMILY MEDICINE

## 2024-08-05 PROCEDURE — 99214 OFFICE O/P EST MOD 30 MIN: CPT | Performed by: FAMILY MEDICINE

## 2024-08-05 PROCEDURE — 3048F LDL-C <100 MG/DL: CPT | Performed by: FAMILY MEDICINE

## 2024-08-05 PROCEDURE — 1036F TOBACCO NON-USER: CPT | Performed by: FAMILY MEDICINE

## 2024-08-05 RX ORDER — ATORVASTATIN CALCIUM 10 MG/1
10 TABLET, FILM COATED ORAL DAILY
Qty: 90 TABLET | Refills: 1 | Status: SHIPPED | OUTPATIENT
Start: 2024-08-05

## 2024-08-05 RX ORDER — FAMOTIDINE 40 MG/1
40 TABLET, FILM COATED ORAL
Qty: 90 TABLET | Refills: 1 | Status: SHIPPED | OUTPATIENT
Start: 2024-08-05 | End: 2025-02-01

## 2024-08-05 RX ORDER — LISINOPRIL 20 MG/1
20 TABLET ORAL DAILY
Qty: 90 TABLET | Refills: 1 | Status: SHIPPED | OUTPATIENT
Start: 2024-08-05

## 2024-08-05 ASSESSMENT — ENCOUNTER SYMPTOMS
EYE REDNESS: 0
CHILLS: 0
HEADACHES: 0
FEVER: 0
CONSTIPATION: 0
DYSURIA: 0
DIFFICULTY URINATING: 0
DIARRHEA: 0
ABDOMINAL DISTENTION: 0
BRUISES/BLEEDS EASILY: 0
ABDOMINAL PAIN: 0
FATIGUE: 0
ADENOPATHY: 0
ARTHRALGIAS: 0
BLOOD IN STOOL: 0
CHEST TIGHTNESS: 0
SORE THROAT: 0
SHORTNESS OF BREATH: 0
EYE PAIN: 0
DYSPHORIC MOOD: 0
APPETITE CHANGE: 0
NERVOUS/ANXIOUS: 0
COUGH: 0
DIZZINESS: 0
WEAKNESS: 0
BACK PAIN: 0

## 2024-08-05 NOTE — PROGRESS NOTES
"Subjective   Patient ID: Ko Porter is a 64 y.o. male who presents for Follow-up.    HPI     Review of Systems    Objective   /70   Pulse 88   Resp 12   Ht 1.626 m (5' 4\")   Wt 83 kg (183 lb)   SpO2 95%   BMI 31.41 kg/m²     Physical Exam    Assessment/Plan          "

## 2024-08-05 NOTE — PROGRESS NOTES
"Subjective   Patient ID: Ko Porter is a 64 y.o. male who presents for Follow-up.  Pt is here for f/u Type 2 diabetes. Sees Dr Freeman.  Pt is working on following low carb diet, and is exercising 1-2 days per week.  Taking Metformin, Jardiance, Ozempic, and Insulin.   Using CGM.   A1c 8.5  Eye exam is   Pt does not have foot pain, paresthesias, or numbness in feet. Foot checks daily - yes  .  Following with podiatry -  no   Denies vision changes, abdominal pain, excessive thirst, frequent urination.     Pt has chronic HTN.  Pt is taking Lisinopril. Tolerating well.  Exercising 1-2 days per week   Low sodium diet is usually being followed.   Is not monitoring home blood pressures.  Denies HA, vision changes or CP.     Pt has Dyslipidemia, CAD. Sees cardiology.  Lipid panel showed LDL 35.  Currently taking Atorvastatin and is tolerating well without muscle pains or weakness.     For hx prostate cancer, sees urology. He had prostate resection in January.             Review of Systems   Constitutional:  Negative for appetite change, chills, fatigue and fever.   HENT:  Negative for congestion, hearing loss and sore throat.    Eyes:  Negative for pain, redness and visual disturbance.   Respiratory:  Negative for cough, chest tightness and shortness of breath.    Cardiovascular:  Negative for chest pain and leg swelling.   Gastrointestinal:  Negative for abdominal distention, abdominal pain, blood in stool, constipation and diarrhea.   Genitourinary:  Negative for difficulty urinating and dysuria.   Musculoskeletal:  Negative for arthralgias and back pain.   Skin:  Negative for rash.   Neurological:  Negative for dizziness, weakness and headaches.   Hematological:  Negative for adenopathy. Does not bruise/bleed easily.   Psychiatric/Behavioral:  Negative for dysphoric mood. The patient is not nervous/anxious.        Objective   /70   Pulse 88   Resp 12   Ht 1.626 m (5' 4\")   Wt 83 kg (183 lb)   SpO2 95%   " BMI 31.41 kg/m²    Physical Exam  Constitutional:       General: He is not in acute distress.     Appearance: Normal appearance.   Cardiovascular:      Rate and Rhythm: Normal rate and regular rhythm.      Heart sounds: Normal heart sounds. No murmur heard.  Pulmonary:      Effort: Pulmonary effort is normal.      Breath sounds: Normal breath sounds.   Abdominal:      Palpations: Abdomen is soft.      Tenderness: There is no abdominal tenderness.   Neurological:      Mental Status: He is alert.   Psychiatric:         Mood and Affect: Mood normal.         Judgment: Judgment normal.           Assessment/Plan   Diagnoses and all orders for this visit:  Type 2 diabetes mellitus - uncontrolled per last A1c. Keep working on diet and try to start up regular exercise.  Coronary artery disease- stable, asymptomatic. Monitoring with cardiology.  Primary hypertension - stable, continue Lisinopril current dose and monitor  Mixed hyperlipidemia - LDL in good range on Atorvastatin, TG high and HDL low  Malignant neoplasm of prostate post prostatectomy, continue to monitor with Urology  LEO (obstructive sleep apnea), mild. Discussed option for treatment and he declines.   GERD - start Famotidine once daily before dinner  Follow up in 6 months, 30mins

## 2024-08-16 ENCOUNTER — LAB (OUTPATIENT)
Dept: LAB | Facility: LAB | Age: 64
End: 2024-08-16
Payer: COMMERCIAL

## 2024-08-16 ENCOUNTER — TELEPHONE (OUTPATIENT)
Dept: UROLOGY | Facility: HOSPITAL | Age: 64
End: 2024-08-16
Payer: COMMERCIAL

## 2024-08-16 DIAGNOSIS — C61 MALIGNANT NEOPLASM OF PROSTATE (MULTI): ICD-10-CM

## 2024-08-16 DIAGNOSIS — C61 PROSTATE CANCER (MULTI): ICD-10-CM

## 2024-08-16 LAB — PSA SERPL-MCNC: <0.1 NG/ML

## 2024-08-16 PROCEDURE — 36415 COLL VENOUS BLD VENIPUNCTURE: CPT

## 2024-08-16 PROCEDURE — 84153 ASSAY OF PSA TOTAL: CPT

## 2024-08-16 NOTE — TELEPHONE ENCOUNTER
Called patient to remind him of upcoming appointment with Dr. Soni on 8/19/2024. Reminded patient to complete PSA blood work before the appointment. Patient is aware and will complete blood work.

## 2024-08-16 NOTE — PROGRESS NOTES
UROLOGIC FOLLOW-UP VISIT     PROBLEM LIST:    1. Elevated PSA        2. Gross hematuria        3. Malignant neoplasm of prostate (Multi)        4. Prostate cancer (Multi)        5. Erectile dysfunction, unspecified erectile dysfunction type             HISTORY OF PRESENT ILLNESS:   62 y/o male with hx of GG4 prostate cancer on biopsy 10/13/2023. PSMA 12/12/2023 was negative for distant or issac mets. Patient underwent RALP on 1/8/24. He's been doing well since surgery. He denies any issues with stress urinary incontinence, but does endorse urge incontinence. Denies pushing or straining to void and states he feels he empties his bladder when voiding.  Post-void residual today is 0 cc.     Patient states he's able to achieve an erection good enough for penetration but is unable to sustain an erection with a constriction device. He does utilize daily 5mg cialis and 20mg Cialis PRN. Patient has issues with controlling his blood sugars and states his urinary sx worsen when his blood sugars are elevated. He was previously on testosterone therapy which he states he noticed improvement after starting.     Patient's appetite and bowel movements are returning to baseline. Patient denies fevers, chills, nausea, and vomiting.   PSA Trend:  4/29/24: <0.10  2/20/2024: <0.10  6/1/2023: 6.1  5/10/2022: 3.3  5/18/2021: 1.95  11/9/2020:5.17    PAST MEDICAL HISTORY:    Past Medical History:   Diagnosis Date    Achilles tendinitis 06/16/2023    Anxiety 06/16/2023    BPH (benign prostatic hyperplasia)     Diabetes mellitus (Multi) 06/16/2023    A1C= 6.9% on 11/6/23    ED (erectile dysfunction)     GERD (gastroesophageal reflux disease)     Hyperlipidemia     Hypertension     LEO (obstructive sleep apnea)     Prostate CA (Multi)        PAST SURGICAL HISTORY:  Past Surgical History:   Procedure Laterality Date    APPENDECTOMY  01/22/2016    Appendectomy    COLONOSCOPY  10/02/2013    Complete Colonoscopy    OTHER SURGICAL HISTORY Left  "10/30/2020    Cataract surgery    PROSTATECTOMY          ALLERGIES:   No Known Allergies     MEDICATIONS:     Current Outpatient Medications:     aspirin 81 mg EC tablet, Take 1 tablet (81 mg) by mouth once daily., Disp: , Rfl:     atorvastatin (Lipitor) 10 mg tablet, Take 1 tablet (10 mg) by mouth once daily., Disp: 90 tablet, Rfl: 1    BD Belkys 2nd Gen Pen Needle 32 gauge x 5/32\" needle, Use with insulin 3x a day, Disp: 300 each, Rfl: 3    blood-glucose sensor (Dexcom G7 Sensor) device, Change every 10 days, Disp: 9 each, Rfl: 3    empagliflozin (Jardiance) 10 mg, Take 1 tablet (10 mg) by mouth once daily., Disp: , Rfl:     famotidine (Pepcid) 40 mg tablet, Take 1 tablet (40 mg) by mouth once daily in the evening.  Take before meals., Disp: 90 tablet, Rfl: 1    FreeStyle Reg 2 Sensor kit, Use as instructed, Disp: 6 each, Rfl: 3    insulin aspart (NovoLOG FlexPen U-100 Insulin) 100 unit/mL (3 mL) pen, Inject under the skin., Disp: , Rfl:     insulin glargine (Lantus) 100 unit/mL (3 mL) pen, Inject 20 Units under the skin once daily. Take as directed per insulin instructions., Disp: 18 mL, Rfl: 3    lisinopril 20 mg tablet, Take 1 tablet (20 mg) by mouth once daily., Disp: 90 tablet, Rfl: 1    metFORMIN (Glucophage) 1,000 mg tablet, TAKE 1 TABLET TWICE A DAY, Disp: 180 tablet, Rfl: 3    pen needle, diabetic (BD Belkys 2nd Gen Pen Needle) 32 gauge x 5/32\" needle, , Disp: , Rfl:     semaglutide (Ozempic) 2 mg/dose (8 mg/3 mL) pen injector, Inject 2 mg under the skin 1 (one) time per week in the early morning.., Disp: 9 mL, Rfl: 3    tadalafil (Cialis) 20 mg tablet, Take 1 tablet (20 mg) by mouth once daily as needed for erectile dysfunction., Disp: 30 tablet, Rfl: 3    tamsulosin (Flomax) 0.4 mg 24 hr capsule, Take 1 capsule (0.4 mg) by mouth once daily., Disp: , Rfl:       SOCIAL HISTORY:  Patient  reports that he has never smoked. He has never used smokeless tobacco. He reports that he does not currently use alcohol. " He reports that he does not use drugs.   Social History     Socioeconomic History    Marital status:      Spouse name: Not on file    Number of children: Not on file    Years of education: Not on file    Highest education level: Not on file   Occupational History    Not on file   Tobacco Use    Smoking status: Never    Smokeless tobacco: Never   Substance and Sexual Activity    Alcohol use: Not Currently    Drug use: Never    Sexual activity: Not on file   Other Topics Concern    Not on file   Social History Narrative    Not on file     Social Determinants of Health     Financial Resource Strain: Not on file   Food Insecurity: Not on file   Transportation Needs: Not on file   Physical Activity: Not on file   Stress: Not on file   Social Connections: Not on file   Intimate Partner Violence: Not on file   Housing Stability: Not on file       FAMILY HISTORY:  Family History   Problem Relation Name Age of Onset    Diabetes Father      Cancer Maternal Grandfather         REVIEW OF SYSTEMS:  Constitutional: Negative for fever and chills. Denies anorexia, weight loss.  Eyes: Negative for visual disturbance.   Respiratory: Negative for shortness of breath.    Cardiovascular: Negative for chest pain.   Gastrointestinal: Negative for nausea and vomiting.   Genitourinary: See interval history above.  Skin: Negative for rash.   Neurological: Negative for dizziness and numbness.   Psychiatric/Behavioral: Negative for confusion and decreased concentration.     PHYSICAL EXAM:  There were no vitals taken for this visit.  Constitutional: Patient appears well-developed and well-nourished. No distress.    Head: Normocephalic and atraumatic.    Neck: Normal range of motion.    Cardiovascular: Normal rate.    Pulmonary/Chest: Effort normal. No respiratory distress.   Abdominal: soft, NT, ND  Musculoskeletal: Normal range of motion.    Neurological: Alert and oriented to person, place, and time.  Psychiatric: Normal mood and  affect. Behavior is normal. Thought content normal.      LABORATORY REVIEW:     Lab Results   Component Value Date    BUN 16 06/06/2024    CREATININE 0.96 06/06/2024    EGFR 90 06/06/2024     06/06/2024    K 4.5 06/06/2024     06/06/2024    CO2 27 06/06/2024    CALCIUM 9.1 06/06/2024      Lab Results   Component Value Date    WBC 5.9 06/06/2024    RBC 5.48 06/06/2024    HGB 15.0 06/06/2024    HCT 50.4 06/06/2024    MCV 92 06/06/2024    MCH 27.4 06/06/2024    MCHC 29.8 (L) 06/06/2024    RDW 13.3 06/06/2024     06/06/2024        Lab Results   Component Value Date    PSA <0.10 04/29/2024    PSA <0.10 02/20/2024    PSA 4.17 (H) 06/01/2023    PSA 3.3 05/10/2022    PSA 1.95 05/18/2021    PSA 1.8 01/06/2021    PSA 5.17 (H) 11/09/2020    PSA 2.09 05/23/2020    PSA 1.74 11/02/2019    PSA 1.97 03/09/2019         Assessment:      1. Elevated PSA        2. Gross hematuria        3. Malignant neoplasm of prostate (Multi)        4. Prostate cancer (Multi)        5. Erectile dysfunction, unspecified erectile dysfunction type            Plan:    Reviewed and interpreted patient's PSA trend    Counseled patient on functional recovery after surgery and that he's improving    Will continue q3m surveillance   Will refer to one of my partners to discuss restarting TRT   RTC in 3 mo    33 minutes total spent on patient's care today; >50% time spent on counseling/coordination of care

## 2024-08-19 ENCOUNTER — APPOINTMENT (OUTPATIENT)
Dept: UROLOGY | Facility: HOSPITAL | Age: 64
End: 2024-08-19
Payer: COMMERCIAL

## 2024-08-19 DIAGNOSIS — N52.9 ERECTILE DYSFUNCTION, UNSPECIFIED ERECTILE DYSFUNCTION TYPE: ICD-10-CM

## 2024-08-19 DIAGNOSIS — C61 PROSTATE CANCER (MULTI): ICD-10-CM

## 2024-08-19 DIAGNOSIS — R31.0 GROSS HEMATURIA: ICD-10-CM

## 2024-08-19 DIAGNOSIS — C61 MALIGNANT NEOPLASM OF PROSTATE (MULTI): ICD-10-CM

## 2024-08-19 DIAGNOSIS — R97.20 ELEVATED PSA: ICD-10-CM

## 2024-08-19 PROCEDURE — 4010F ACE/ARB THERAPY RXD/TAKEN: CPT | Performed by: STUDENT IN AN ORGANIZED HEALTH CARE EDUCATION/TRAINING PROGRAM

## 2024-08-19 PROCEDURE — 99214 OFFICE O/P EST MOD 30 MIN: CPT | Performed by: STUDENT IN AN ORGANIZED HEALTH CARE EDUCATION/TRAINING PROGRAM

## 2024-08-19 PROCEDURE — 3048F LDL-C <100 MG/DL: CPT | Performed by: STUDENT IN AN ORGANIZED HEALTH CARE EDUCATION/TRAINING PROGRAM

## 2024-08-19 PROCEDURE — 3052F HG A1C>EQUAL 8.0%<EQUAL 9.0%: CPT | Performed by: STUDENT IN AN ORGANIZED HEALTH CARE EDUCATION/TRAINING PROGRAM

## 2024-08-19 PROCEDURE — 3061F NEG MICROALBUMINURIA REV: CPT | Performed by: STUDENT IN AN ORGANIZED HEALTH CARE EDUCATION/TRAINING PROGRAM

## 2024-09-16 ENCOUNTER — APPOINTMENT (OUTPATIENT)
Dept: ENDOCRINOLOGY | Facility: CLINIC | Age: 64
End: 2024-09-16
Payer: COMMERCIAL

## 2024-09-16 VITALS
RESPIRATION RATE: 16 BRPM | HEIGHT: 64 IN | BODY MASS INDEX: 31.28 KG/M2 | WEIGHT: 183.2 LBS | SYSTOLIC BLOOD PRESSURE: 124 MMHG | HEART RATE: 78 BPM | DIASTOLIC BLOOD PRESSURE: 64 MMHG

## 2024-09-16 DIAGNOSIS — E78.2 MIXED HYPERLIPIDEMIA: ICD-10-CM

## 2024-09-16 DIAGNOSIS — I10 HYPERTENSION, UNSPECIFIED TYPE: ICD-10-CM

## 2024-09-16 DIAGNOSIS — E11.69 TYPE 2 DIABETES MELLITUS WITH MORBID OBESITY (MULTI): Primary | ICD-10-CM

## 2024-09-16 DIAGNOSIS — E66.01 TYPE 2 DIABETES MELLITUS WITH MORBID OBESITY (MULTI): Primary | ICD-10-CM

## 2024-09-16 LAB — POC HEMOGLOBIN A1C: 7.3 % (ref 4.2–6.5)

## 2024-09-16 PROCEDURE — 3078F DIAST BP <80 MM HG: CPT | Performed by: INTERNAL MEDICINE

## 2024-09-16 PROCEDURE — 3061F NEG MICROALBUMINURIA REV: CPT | Performed by: INTERNAL MEDICINE

## 2024-09-16 PROCEDURE — 3052F HG A1C>EQUAL 8.0%<EQUAL 9.0%: CPT | Performed by: INTERNAL MEDICINE

## 2024-09-16 PROCEDURE — 4010F ACE/ARB THERAPY RXD/TAKEN: CPT | Performed by: INTERNAL MEDICINE

## 2024-09-16 PROCEDURE — 83036 HEMOGLOBIN GLYCOSYLATED A1C: CPT | Performed by: INTERNAL MEDICINE

## 2024-09-16 PROCEDURE — 3048F LDL-C <100 MG/DL: CPT | Performed by: INTERNAL MEDICINE

## 2024-09-16 PROCEDURE — 1036F TOBACCO NON-USER: CPT | Performed by: INTERNAL MEDICINE

## 2024-09-16 PROCEDURE — 99214 OFFICE O/P EST MOD 30 MIN: CPT | Performed by: INTERNAL MEDICINE

## 2024-09-16 PROCEDURE — 3008F BODY MASS INDEX DOCD: CPT | Performed by: INTERNAL MEDICINE

## 2024-09-16 PROCEDURE — 3074F SYST BP LT 130 MM HG: CPT | Performed by: INTERNAL MEDICINE

## 2024-09-16 ASSESSMENT — ENCOUNTER SYMPTOMS
DIZZINESS: 0
SHORTNESS OF BREATH: 0
NAUSEA: 0
VOMITING: 0
DIARRHEA: 0
LIGHT-HEADEDNESS: 0
FEVER: 0
CHILLS: 0

## 2024-09-16 NOTE — PATIENT INSTRUCTIONS
A1c today  Your sugars are much improved  Do not increase insulin doses at this time  Follow up in 3 months

## 2024-09-16 NOTE — PROGRESS NOTES
Endocrinology: Follow up visit  Subjective   Patient ID: Ko Porter is a 64 y.o. male who presents for Diabetes (Type 2 ), Hyperlipidemia, and Hypertension.    PCP: Enmanuel Lui MD    HPI  Dm2:  Metformin 2000 every day  Jardiance 10 mg  Ozempic 2 mg (increased last time)  Mdi 20 lantus  Meal 27/20    Since last numbers much improved since increase in ozempic and more attention to insulin regimen.  On dexcom 72% tir with rare lows  One low overnight but took increased lantus dose as thought projected A1c needed to be less than 6?    Checks sugars 4x a day  Injects insulin 3x a day  Currently utilizing cgm to check sugars    Review of Systems   Constitutional:  Negative for chills and fever.   Respiratory:  Negative for shortness of breath.    Gastrointestinal:  Negative for diarrhea, nausea and vomiting.   Endocrine: Negative for cold intolerance and heat intolerance.   Neurological:  Negative for dizziness and light-headedness.       Patient Active Problem List   Diagnosis    Age-related nuclear cataract of left eye    Chalazion of right lower eyelid    Chalazion of right upper eyelid    Elevated PSA    Enlarged prostate with lower urinary tract symptoms (LUTS)    Erectile dysfunction    Gross hematuria    Hyperlipidemia    Hypertension    Hypogonadism male    Impacted cerumen of left ear    Ingrowing toenail    Meibomian gland dysfunction (MGD)    Obesity    S/P cataract surgery    Phimosis    LEO (obstructive sleep apnea)    Urinary incontinence    Vitamin D insufficiency    Cataract, right    Cortical cataract    Vitreous floaters of right eye    Elevated coronary artery calcium score    Coronary artery calcification    Difficult intubation    Malignant neoplasm of prostate (Multi)    Healthcare maintenance    Type 2 diabetes mellitus with hyperlipidemia (Multi)    Coronary artery disease involving native coronary artery of native heart without angina pectoris    Colon cancer screening        Home  "Meds:  Current Outpatient Medications   Medication Instructions    aspirin 81 mg EC tablet 1 tablet, oral, Daily    atorvastatin (LIPITOR) 10 mg, oral, Daily    BD Belkys 2nd Gen Pen Needle 32 gauge x 5/32\" needle Use with insulin 3x a day    blood-glucose sensor (Dexcom G7 Sensor) device Change every 10 days    empagliflozin (Jardiance) 10 mg 1 tablet, oral, Daily    famotidine (PEPCID) 40 mg, oral, Daily before evening meal    FreeStyle Reg 2 Sensor kit Use as instructed    insulin aspart (NovoLOG FlexPen U-100 Insulin) 100 unit/mL (3 mL) pen subcutaneous    insulin glargine (LANTUS) 20 Units, subcutaneous, Daily, Take as directed per insulin instructions.    lisinopril 20 mg, oral, Daily    metFORMIN (GLUCOPHAGE) 1,000 mg, oral, 2 times daily    Ozempic 2 mg, subcutaneous, Weekly (0600)    pen needle, diabetic (BD Belkys 2nd Gen Pen Needle) 32 gauge x 5/32\" needle miscellaneous    tadalafil (CIALIS) 20 mg, oral, Daily PRN    tamsulosin (FLOMAX) 0.4 mg, oral, Daily        No Known Allergies     Objective   Vitals:    09/16/24 1002   BP: 124/64   Pulse: 78   Resp: 16      Vitals:    09/16/24 1002   Weight: 83.1 kg (183 lb 3.2 oz)      Body mass index is 31.45 kg/m².   Physical Exam  Constitutional:       Appearance: Normal appearance. He is overweight.   HENT:      Head: Normocephalic and atraumatic.   Neck:      Thyroid: No thyroid mass, thyromegaly or thyroid tenderness.   Cardiovascular:      Rate and Rhythm: Normal rate and regular rhythm.      Heart sounds: No murmur heard.     No gallop.   Pulmonary:      Effort: Pulmonary effort is normal.      Breath sounds: Normal breath sounds.   Abdominal:      Palpations: Abdomen is soft.      Comments: benign   Neurological:      General: No focal deficit present.      Mental Status: He is alert and oriented to person, place, and time.      Deep Tendon Reflexes: Reflexes are normal and symmetric.   Psychiatric:         Behavior: Behavior is cooperative. " "        Labs:  Lab Results   Component Value Date    HGBA1C 8.5 (H) 06/06/2024      No results found for: \"PR1\", \"THYROIDPAB\", \"TSI\"     Assessment/Plan   Assessment & Plan  Type 2 diabetes mellitus with morbid obesity (Multi)    A1c today  Discussed goals  Should not increase insulin at this time  Numbers are much improved  Mixed hyperlipidemia    Fasting lipids last time were excellent on statin  Hypertension, unspecified type    Bp excellent on current meds  Follow up in 4 months      Electronically signed by:  Deloris Freeman MD 09/16/24 10:02 AM              "

## 2024-09-19 DIAGNOSIS — C61 PROSTATE CANCER (MULTI): ICD-10-CM

## 2024-09-20 NOTE — PROGRESS NOTES
"HPI:  64 y.o. yo male patient complains of  erectile dysfunction and low testosterone. Referred by Dr. Soni.    64 y/o male with hx of GG4 prostate cancer on biopsy 10/13/2023. Patient underwent RALP on 1/8/24.   Here fo ED / low T.     #Erectile Dysfunction  - Currently on Cialis 20 mg as needed for erection, not working  - Uses a rubber band around base of penis for penetration  - No history of cardiac issues   - Has had injections in the past, failed    #Low testosterone  - No sex drive  -was on IM injections in past      s/p prostatectomy: yes 1/2024  On nitrates/NTG?: No  Smoker:?  Partner: Yes, wife  Tried PDE5is?:   Viagra/sildenafil - response:   Cialis/tadalafil- response: yes, 5mg and 20mg prn  Tried penile injections: Yes  Libido/Desire: None  Have been on Testosterone /anabolic steroids? yes  Pain or curvature in penis when erect: None  Premature or delayed ejaculation:  None    Lab Results   Component Value Date    TESTOSTERONE 171 (L) 06/01/2023     Lab Results   Component Value Date    LH 5.1 08/04/2018     Lab Results   Component Value Date    FSH 3.4 08/04/2018     No components found for: \"ESTRADIAL\"  Lab Results   Component Value Date    PSA <0.10 08/16/2024     No components found for: \"CBC\"  Lab Results   Component Value Date    PROLACTIN 5.3 08/04/2018     Lab Results   Component Value Date    HGBA1C 7.3 (A) 09/16/2024         PMH:  Past Medical History:   Diagnosis Date    Achilles tendinitis 06/16/2023    Anxiety 06/16/2023    BPH (benign prostatic hyperplasia)     Diabetes mellitus (Multi) 06/16/2023    A1C= 6.9% on 11/6/23    ED (erectile dysfunction)     GERD (gastroesophageal reflux disease)     Hyperlipidemia     Hypertension     LEO (obstructive sleep apnea)     Prostate CA (Multi)         PSH:  Past Surgical History:   Procedure Laterality Date    APPENDECTOMY  01/22/2016    Appendectomy    COLONOSCOPY  10/02/2013    Complete Colonoscopy    OTHER SURGICAL HISTORY Left 10/30/2020    " "Cataract surgery    PROSTATECTOMY          Medications:    Current Outpatient Medications:     aspirin 81 mg EC tablet, Take 1 tablet (81 mg) by mouth once daily., Disp: , Rfl:     atorvastatin (Lipitor) 10 mg tablet, Take 1 tablet (10 mg) by mouth once daily., Disp: 90 tablet, Rfl: 1    BD Belkys 2nd Gen Pen Needle 32 gauge x 5/32\" needle, Use with insulin 3x a day, Disp: 300 each, Rfl: 3    blood-glucose sensor (Dexcom G7 Sensor) device, Change every 10 days, Disp: 9 each, Rfl: 3    empagliflozin (Jardiance) 10 mg, Take 1 tablet (10 mg) by mouth once daily., Disp: , Rfl:     famotidine (Pepcid) 40 mg tablet, Take 1 tablet (40 mg) by mouth once daily in the evening.  Take before meals., Disp: 90 tablet, Rfl: 1    FreeStyle Reg 2 Sensor kit, Use as instructed (Patient not taking: Reported on 9/16/2024), Disp: 6 each, Rfl: 3    insulin aspart (NovoLOG FlexPen U-100 Insulin) 100 unit/mL (3 mL) pen, Inject under the skin., Disp: , Rfl:     insulin glargine (Lantus) 100 unit/mL (3 mL) pen, Inject 20 Units under the skin once daily. Take as directed per insulin instructions., Disp: 18 mL, Rfl: 3    lisinopril 20 mg tablet, Take 1 tablet (20 mg) by mouth once daily., Disp: 90 tablet, Rfl: 1    metFORMIN (Glucophage) 1,000 mg tablet, TAKE 1 TABLET TWICE A DAY, Disp: 180 tablet, Rfl: 3    pen needle, diabetic (BD Belkys 2nd Gen Pen Needle) 32 gauge x 5/32\" needle, , Disp: , Rfl:     semaglutide (Ozempic) 2 mg/dose (8 mg/3 mL) pen injector, Inject 2 mg under the skin 1 (one) time per week in the early morning.., Disp: 9 mL, Rfl: 3    tadalafil (Cialis) 20 mg tablet, Take 1 tablet (20 mg) by mouth once daily as needed for erectile dysfunction., Disp: 30 tablet, Rfl: 3    tamsulosin (Flomax) 0.4 mg 24 hr capsule, Take 1 capsule (0.4 mg) by mouth once daily., Disp: , Rfl:     Allergy:  No Known Allergies     Exam  Testicles descended bilaterally, nontender, no masses  Vasa palpable bilaterally  Penis circ'd, no lesions, no " plaques      Assessment/Plan  #Erectile Dysfunction: Has failed pills and injections. S/p robotic prostatectomy    We had a long discussion regarding penile prosthesis, including risks, benefits, and alternatives. We discussed risks including but not limited to pain, bleeding, infection, damage to adjacent structures, need for further procedures, malfunction, erosion, extrusion, complications of anesthesia etc. We discussed the postoperative course and expectations, and specifically that after getting an implant, spontaneous erections do not occur other methods such as injections etc are no longer effective. We also discussed the effect of the implant on length and that it will likely be shorter than previous given age and duration of ED. Further, if he gets an infection or the implant has to be removed for any reason, there is potential for him to never have erections again. All questions were answered and reading materials were given. T  -pt will review his options and reading material and return for follow up with his wife to review      I reviewed the common causes of hypogonadism with the patient. We will obtain a fasting, morning hypogonadal panel to determine if his hormonal axis is abnormal. We will evaluate his lab results and if needed, at that point we will begin treatment.    Hypogonadism  fasting hypogonadal panel    Screening for polycythemia  Hct  prn phlebotomy for Hct>54    Screening for hyperestrogenemia  E2   arimidex if E is elevated    Prostate ca   psa      Fu in 2 weeks, he is going to bring his wife to this visit    G [id number]  Visit complexity inherent to evaluation and management (E&M) associated with medical care services that serve as the continuing focal point for all needed health care services and/or with medical care services that are part of ongoing care related to a patient's single, serious condition or a complex condition.       Scribe Attestation  By signing my name below, Jenniffer PEOPLES  Efren Morse   attest that this documentation has been prepared under the direction and in the presence of Gisell Yan MD.

## 2024-09-23 ENCOUNTER — OFFICE VISIT (OUTPATIENT)
Dept: UROLOGY | Facility: HOSPITAL | Age: 64
End: 2024-09-23
Payer: COMMERCIAL

## 2024-09-23 DIAGNOSIS — E29.1 HYPOGONADISM MALE: ICD-10-CM

## 2024-09-23 DIAGNOSIS — C61 MALIGNANT NEOPLASM OF PROSTATE (MULTI): ICD-10-CM

## 2024-09-23 DIAGNOSIS — Z12.5 PROSTATE CANCER SCREENING: ICD-10-CM

## 2024-09-23 DIAGNOSIS — R79.89 LOW TESTOSTERONE: ICD-10-CM

## 2024-09-23 DIAGNOSIS — N52.9 ERECTILE DYSFUNCTION, UNSPECIFIED ERECTILE DYSFUNCTION TYPE: Primary | ICD-10-CM

## 2024-09-23 PROCEDURE — 3052F HG A1C>EQUAL 8.0%<EQUAL 9.0%: CPT | Performed by: UROLOGY

## 2024-09-23 PROCEDURE — 99214 OFFICE O/P EST MOD 30 MIN: CPT | Performed by: UROLOGY

## 2024-09-23 PROCEDURE — G2211 COMPLEX E/M VISIT ADD ON: HCPCS | Performed by: UROLOGY

## 2024-09-23 PROCEDURE — 3061F NEG MICROALBUMINURIA REV: CPT | Performed by: UROLOGY

## 2024-09-23 PROCEDURE — 4010F ACE/ARB THERAPY RXD/TAKEN: CPT | Performed by: UROLOGY

## 2024-09-23 PROCEDURE — 3048F LDL-C <100 MG/DL: CPT | Performed by: UROLOGY

## 2024-10-08 ENCOUNTER — LAB (OUTPATIENT)
Dept: LAB | Facility: LAB | Age: 64
End: 2024-10-08
Payer: COMMERCIAL

## 2024-10-08 ENCOUNTER — APPOINTMENT (OUTPATIENT)
Dept: UROLOGY | Facility: CLINIC | Age: 64
End: 2024-10-08
Payer: COMMERCIAL

## 2024-10-08 DIAGNOSIS — Z12.5 PROSTATE CANCER SCREENING: ICD-10-CM

## 2024-10-08 DIAGNOSIS — R79.89 LOW TESTOSTERONE: ICD-10-CM

## 2024-10-08 LAB
HCT VFR BLD AUTO: 47.8 % (ref 41–52)
LH SERPL-ACNC: 6.9 IU/L
PROLACTIN SERPL-MCNC: 6 UG/L (ref 2–18)
PSA SERPL-MCNC: <0.1 NG/ML

## 2024-10-08 PROCEDURE — 84146 ASSAY OF PROLACTIN: CPT

## 2024-10-08 PROCEDURE — 84403 ASSAY OF TOTAL TESTOSTERONE: CPT

## 2024-10-08 PROCEDURE — 85014 HEMATOCRIT: CPT

## 2024-10-08 PROCEDURE — 83002 ASSAY OF GONADOTROPIN (LH): CPT

## 2024-10-08 PROCEDURE — 36415 COLL VENOUS BLD VENIPUNCTURE: CPT

## 2024-10-08 PROCEDURE — 84153 ASSAY OF PSA TOTAL: CPT

## 2024-10-09 LAB — TESTOST SERPL-MCNC: 179 NG/DL (ref 240–1000)

## 2024-10-09 NOTE — PROGRESS NOTES
"HPI:  64 y.o. yo male patient complains of  erectile dysfunction and low testosterone. Referred by Dr. Soni.    64 y/o male with hx of GG4 prostate cancer on biopsy 10/13/2023. Patient underwent RALP on 1/8/24.   Here fo ED / low T.     Last Visit 9/23/24:  -discussed low T treatment options  -fu in 2 weeks with low T labs     Today's Visit    #Erectile Dysfunction  - Currently on Cialis 20 mg as needed for erection, not working  - Uses a rubber band around base of penis for penetration  - No history of cardiac issues , no MI/strokes  - Has had injections in the past, failed  -for now decided against IPP as he was able to have an erection once    #Low testosterone  - reviewed T labs   - No sex drive  -was on IM injections in past  -interested in restarting, wants wife to do injections      s/p prostatectomy: yes 1/2024  On nitrates/NTG?: No  Smoker:?  Partner: Yes, wife  Tried PDE5is?:   Viagra/sildenafil - response:   Cialis/tadalafil- response: yes, 5mg and 20mg prn  Tried penile injections: Yes  Libido/Desire: None  Have been on Testosterone /anabolic steroids? yes  Pain or curvature in penis when erect: None  Premature or delayed ejaculation:  None    Lab Results   Component Value Date    TESTOSTERONE 179 (L) 10/08/2024    TESTOSTERONE 171 (L) 06/01/2023     Lab Results   Component Value Date    LH 6.9 10/08/2024     Lab Results   Component Value Date    FSH 3.4 08/04/2018     No components found for: \"ESTRADIAL\"  Lab Results   Component Value Date    PSA <0.10 10/08/2024     No components found for: \"CBC\"  Lab Results   Component Value Date    PROLACTIN 6.0 10/08/2024     Lab Results   Component Value Date    HGBA1C 7.3 (A) 09/16/2024     Component      Latest Ref Rng 10/8/2024   HEMATOCRIT      41.0 - 52.0 % 47.8          PMH:  Past Medical History:   Diagnosis Date    Achilles tendinitis 06/16/2023    Anxiety 06/16/2023    BPH (benign prostatic hyperplasia)     Diabetes mellitus (Multi) 06/16/2023    A1C= " "6.9% on 11/6/23    ED (erectile dysfunction)     GERD (gastroesophageal reflux disease)     Hyperlipidemia     Hypertension     LEO (obstructive sleep apnea)     Prostate CA (Multi)         PSH:  Past Surgical History:   Procedure Laterality Date    APPENDECTOMY  01/22/2016    Appendectomy    COLONOSCOPY  10/02/2013    Complete Colonoscopy    OTHER SURGICAL HISTORY Left 10/30/2020    Cataract surgery    PROSTATECTOMY          Medications:    Current Outpatient Medications:     aspirin 81 mg EC tablet, Take 1 tablet (81 mg) by mouth once daily., Disp: , Rfl:     atorvastatin (Lipitor) 10 mg tablet, Take 1 tablet (10 mg) by mouth once daily., Disp: 90 tablet, Rfl: 1    BD Belkys 2nd Gen Pen Needle 32 gauge x 5/32\" needle, Use with insulin 3x a day, Disp: 300 each, Rfl: 3    blood-glucose sensor (Dexcom G7 Sensor) device, Change every 10 days, Disp: 9 each, Rfl: 3    empagliflozin (Jardiance) 10 mg, Take 1 tablet (10 mg) by mouth once daily., Disp: , Rfl:     famotidine (Pepcid) 40 mg tablet, Take 1 tablet (40 mg) by mouth once daily in the evening.  Take before meals., Disp: 90 tablet, Rfl: 1    FreeStyle Reg 2 Sensor kit, Use as instructed (Patient not taking: Reported on 9/16/2024), Disp: 6 each, Rfl: 3    insulin aspart (NovoLOG FlexPen U-100 Insulin) 100 unit/mL (3 mL) pen, Inject under the skin., Disp: , Rfl:     insulin glargine (Lantus) 100 unit/mL (3 mL) pen, Inject 20 Units under the skin once daily. Take as directed per insulin instructions., Disp: 18 mL, Rfl: 3    lisinopril 20 mg tablet, Take 1 tablet (20 mg) by mouth once daily., Disp: 90 tablet, Rfl: 1    metFORMIN (Glucophage) 1,000 mg tablet, TAKE 1 TABLET TWICE A DAY, Disp: 180 tablet, Rfl: 3    pen needle, diabetic (BD Belkys 2nd Gen Pen Needle) 32 gauge x 5/32\" needle, , Disp: , Rfl:     semaglutide (Ozempic) 2 mg/dose (8 mg/3 mL) pen injector, Inject 2 mg under the skin 1 (one) time per week in the early morning.., Disp: 9 mL, Rfl: 3    tadalafil " (Cialis) 20 mg tablet, Take 1 tablet (20 mg) by mouth once daily as needed for erectile dysfunction., Disp: 30 tablet, Rfl: 3    tamsulosin (Flomax) 0.4 mg 24 hr capsule, Take 1 capsule (0.4 mg) by mouth once daily., Disp: , Rfl:     Allergy:  No Known Allergies     Exam  CONSTITUTIONAL:        No acute distress    HEAD:        Normocephalic and atraumatic    CHEST / RESPIRATORY      no excess work of breathing, no respiratory distress,    ABDOMEN / GASTROINTESTINAL:        Abdomen nondistended          Assessment/Plan  #Erectile Dysfunction: Has failed pills and injections. S/p robotic prostatectomy2  Reviewed r/b/a of implant again, patient wants to hold off now    - continue Cialis   -wants to hold off on IPP    #hypogonadism   I reviewed the common causes of hypogonadism with the patient. We will obtain a fasting, morning hypogonadal panel to determine if his hormonal axis is abnormal. We will evaluate his lab results and if needed, at that point we will begin treatment.     We discussed different modalities to treat hypogonadism, including hcg, anastrozole, clomiphene, testosterone gels/creams, nasal testosterone, testosterone pellets, and testosterone injections. I also reviewed with him common risks following testosterone replacement, including cholesterol imbalance, polycythemia, cancer progression and infertility.  He understands these risks and wishes to proceed. The patient is to adhere to a strict followup either every 3-4 months or biannually.  We reviewed with him with our office's consent form.     We also reviewed his fertility status and whether sperm production and/or prevention of testicular atrophy is a concern.  If so, HCG will be added.  We also discussed the possibility of gynecomastia secondary to testosterone therapy and methods of prevention and/or treatment if this occurs.    - start 0.3cc BIW , will comeback with wife for injection teaching     Hypogonadism  fasting hypogonadal  panel    Screening for polycythemia  Hct  prn phlebotomy for Hct>54    Screening for hyperestrogenemia  E2   arimidex if E is elevated    Prostate ca   psa    Fu  for injection teaching, he is going to bring his wife to this visit    G 2211  Visit complexity inherent to evaluation and management (E&M) associated with medical care services that serve as the continuing focal point for all needed health care services and/or with medical care services that are part of ongoing care related to a patient's single, serious condition or a complex condition.     I have personally reviewed the OARRS report for this patient I have considered the risks of abuse, dependence, addiction and diversion. I believe that it is clinically appropriate for him to be prescribed this medication.”       Scribe Attestation  By signing my name below, I, Efren Kathleen   attest that this documentation has been prepared under the direction and in the presence of Gisell Yan MD.

## 2024-10-14 ENCOUNTER — APPOINTMENT (OUTPATIENT)
Dept: UROLOGY | Facility: HOSPITAL | Age: 64
End: 2024-10-14
Payer: COMMERCIAL

## 2024-10-14 DIAGNOSIS — R97.20 ELEVATED PSA: ICD-10-CM

## 2024-10-14 DIAGNOSIS — E29.1 HYPOGONADISM MALE: ICD-10-CM

## 2024-10-14 DIAGNOSIS — C61 MALIGNANT NEOPLASM OF PROSTATE (MULTI): Primary | ICD-10-CM

## 2024-10-14 DIAGNOSIS — N40.1 BENIGN PROSTATIC HYPERPLASIA WITH LOWER URINARY TRACT SYMPTOMS, SYMPTOM DETAILS UNSPECIFIED: ICD-10-CM

## 2024-10-14 DIAGNOSIS — N52.31 ERECTILE DYSFUNCTION AFTER RADICAL PROSTATECTOMY: ICD-10-CM

## 2024-10-14 PROCEDURE — 3061F NEG MICROALBUMINURIA REV: CPT | Performed by: UROLOGY

## 2024-10-14 PROCEDURE — 3048F LDL-C <100 MG/DL: CPT | Performed by: UROLOGY

## 2024-10-14 PROCEDURE — 99214 OFFICE O/P EST MOD 30 MIN: CPT | Performed by: UROLOGY

## 2024-10-14 PROCEDURE — 4010F ACE/ARB THERAPY RXD/TAKEN: CPT | Performed by: UROLOGY

## 2024-10-14 PROCEDURE — G2211 COMPLEX E/M VISIT ADD ON: HCPCS | Performed by: UROLOGY

## 2024-10-14 PROCEDURE — 3052F HG A1C>EQUAL 8.0%<EQUAL 9.0%: CPT | Performed by: UROLOGY

## 2024-10-15 DIAGNOSIS — E11.69 TYPE 2 DIABETES MELLITUS WITH MORBID OBESITY (MULTI): Primary | ICD-10-CM

## 2024-10-15 DIAGNOSIS — E66.01 TYPE 2 DIABETES MELLITUS WITH MORBID OBESITY (MULTI): Primary | ICD-10-CM

## 2024-10-15 RX ORDER — INSULIN ASPART 100 [IU]/ML
INJECTION, SOLUTION INTRAVENOUS; SUBCUTANEOUS
Qty: 45 ML | Refills: 3 | Status: SHIPPED | OUTPATIENT
Start: 2024-10-15

## 2024-10-29 ENCOUNTER — APPOINTMENT (OUTPATIENT)
Dept: UROLOGY | Facility: CLINIC | Age: 64
End: 2024-10-29
Payer: COMMERCIAL

## 2024-10-29 VITALS — HEIGHT: 64 IN | BODY MASS INDEX: 29.71 KG/M2 | WEIGHT: 174 LBS | TEMPERATURE: 97.3 F

## 2024-10-29 DIAGNOSIS — R79.89 LOW TESTOSTERONE: ICD-10-CM

## 2024-10-29 DIAGNOSIS — C61 MALIGNANT NEOPLASM OF PROSTATE (MULTI): Primary | ICD-10-CM

## 2024-10-29 DIAGNOSIS — E29.1 HYPOGONADISM IN MALE: ICD-10-CM

## 2024-10-29 DIAGNOSIS — Z12.5 PROSTATE CANCER SCREENING: ICD-10-CM

## 2024-10-29 DIAGNOSIS — N52.31 ERECTILE DYSFUNCTION AFTER RADICAL PROSTATECTOMY: ICD-10-CM

## 2024-10-29 DIAGNOSIS — E29.1 HYPOGONADISM MALE: ICD-10-CM

## 2024-10-29 PROCEDURE — 99214 OFFICE O/P EST MOD 30 MIN: CPT | Performed by: UROLOGY

## 2024-10-29 PROCEDURE — 4010F ACE/ARB THERAPY RXD/TAKEN: CPT | Performed by: UROLOGY

## 2024-10-29 PROCEDURE — 3008F BODY MASS INDEX DOCD: CPT | Performed by: UROLOGY

## 2024-10-29 PROCEDURE — 3048F LDL-C <100 MG/DL: CPT | Performed by: UROLOGY

## 2024-10-29 PROCEDURE — 3061F NEG MICROALBUMINURIA REV: CPT | Performed by: UROLOGY

## 2024-10-29 PROCEDURE — 3052F HG A1C>EQUAL 8.0%<EQUAL 9.0%: CPT | Performed by: UROLOGY

## 2024-10-29 PROCEDURE — 1036F TOBACCO NON-USER: CPT | Performed by: UROLOGY

## 2024-10-29 PROCEDURE — G2211 COMPLEX E/M VISIT ADD ON: HCPCS | Performed by: UROLOGY

## 2024-10-29 RX ORDER — TESTOSTERONE 20.25 MG/1.25G
2 GEL TOPICAL DAILY
Qty: 75 G | Refills: 3 | Status: SHIPPED | OUTPATIENT
Start: 2024-10-29

## 2024-10-29 RX ORDER — TESTOSTERONE 20.25 MG/1.25G
2 GEL TOPICAL DAILY
Qty: 75 G | Refills: 3 | Status: SHIPPED | OUTPATIENT
Start: 2024-10-29 | End: 2024-10-29

## 2024-10-29 ASSESSMENT — PAIN SCALES - GENERAL: PAINLEVEL_OUTOF10: 0-NO PAIN

## 2024-11-18 ENCOUNTER — OFFICE VISIT (OUTPATIENT)
Dept: UROLOGY | Facility: HOSPITAL | Age: 64
End: 2024-11-18
Payer: COMMERCIAL

## 2024-11-18 ENCOUNTER — APPOINTMENT (OUTPATIENT)
Dept: UROLOGY | Facility: HOSPITAL | Age: 64
End: 2024-11-18
Payer: COMMERCIAL

## 2024-11-18 DIAGNOSIS — N52.31 ERECTILE DYSFUNCTION AFTER RADICAL PROSTATECTOMY: ICD-10-CM

## 2024-11-18 DIAGNOSIS — C61 PROSTATE CANCER (MULTI): ICD-10-CM

## 2024-11-18 DIAGNOSIS — N40.0 BENIGN PROSTATIC HYPERPLASIA, UNSPECIFIED WHETHER LOWER URINARY TRACT SYMPTOMS PRESENT: Primary | ICD-10-CM

## 2024-11-18 PROCEDURE — 3052F HG A1C>EQUAL 8.0%<EQUAL 9.0%: CPT | Performed by: STUDENT IN AN ORGANIZED HEALTH CARE EDUCATION/TRAINING PROGRAM

## 2024-11-18 PROCEDURE — 3048F LDL-C <100 MG/DL: CPT | Performed by: STUDENT IN AN ORGANIZED HEALTH CARE EDUCATION/TRAINING PROGRAM

## 2024-11-18 PROCEDURE — 4010F ACE/ARB THERAPY RXD/TAKEN: CPT | Performed by: STUDENT IN AN ORGANIZED HEALTH CARE EDUCATION/TRAINING PROGRAM

## 2024-11-18 PROCEDURE — 99214 OFFICE O/P EST MOD 30 MIN: CPT | Performed by: STUDENT IN AN ORGANIZED HEALTH CARE EDUCATION/TRAINING PROGRAM

## 2024-11-18 PROCEDURE — 3061F NEG MICROALBUMINURIA REV: CPT | Performed by: STUDENT IN AN ORGANIZED HEALTH CARE EDUCATION/TRAINING PROGRAM

## 2024-11-18 RX ORDER — TADALAFIL 20 MG/1
20 TABLET ORAL DAILY PRN
Qty: 12 TABLET | Refills: 11 | Status: SHIPPED | OUTPATIENT
Start: 2024-11-18 | End: 2025-04-11

## 2024-11-18 RX ORDER — TADALAFIL 5 MG/1
5 TABLET ORAL DAILY
Qty: 30 TABLET | Refills: 11 | Status: SHIPPED | OUTPATIENT
Start: 2024-11-18 | End: 2025-11-13

## 2024-11-18 NOTE — PROGRESS NOTES
UROLOGIC FOLLOW-UP VISIT     PROBLEM LIST:    1. Benign prostatic hyperplasia, unspecified whether lower urinary tract symptoms present  Measure post void residual      2. Prostate cancer (Multi)  PSA      3. Erectile dysfunction after radical prostatectomy  tadalafil (Cialis) 5 mg tablet    tadalafil (Cialis) 20 mg tablet             HISTORY OF PRESENT ILLNESS:   64 y/o male with hx of GG4 prostate cancer on biopsy 10/13/2023. PSMA 12/12/2023 was negative for distant or issac mets. Patient underwent RALP on 1/8/24. He's been doing well since surgery. He denies any issues with stress urinary incontinence, but does endorse urge incontinence. Denies pushing or straining to void and states he feels he empties his bladder when voiding.  Post-void residual today is 0 cc.     During his last visit he was able to achieve an erection good enough for penetration but is unable to sustain an erection with a constriction device. He has not used daily 5mg cialis. He had issues with controlling his blood sugars and states his urinary sx worsen when his blood sugars are elevated. He was previously on testosterone therapy which he states he noticed improvement after starting.     He reports being overall well today. He notes improvement in his erections and continues to be on Cialis.    Patient denies fevers, chills, nausea, and vomiting.     PSA Trend:  10/8/24: <0.10  4/29/24: <0.10  2/20/2024: <0.10  6/1/2023: 6.1  5/10/2022: 3.3  5/18/2021: 1.95  11/9/2020:5.17    PAST MEDICAL HISTORY:    Past Medical History:   Diagnosis Date    Achilles tendinitis 06/16/2023    Anxiety 06/16/2023    BPH (benign prostatic hyperplasia)     Diabetes mellitus (Multi) 06/16/2023    A1C= 6.9% on 11/6/23    ED (erectile dysfunction)     GERD (gastroesophageal reflux disease)     Hyperlipidemia     Hypertension     LEO (obstructive sleep apnea)     Prostate CA (Multi)        PAST SURGICAL HISTORY:  Past Surgical History:   Procedure Laterality Date  "   APPENDECTOMY  01/22/2016    Appendectomy    COLONOSCOPY  10/02/2013    Complete Colonoscopy    OTHER SURGICAL HISTORY Left 10/30/2020    Cataract surgery    PROSTATECTOMY          ALLERGIES:   No Known Allergies     MEDICATIONS:     Current Outpatient Medications:     aspirin 81 mg EC tablet, Take 1 tablet (81 mg) by mouth once daily., Disp: , Rfl:     atorvastatin (Lipitor) 10 mg tablet, Take 1 tablet (10 mg) by mouth once daily., Disp: 90 tablet, Rfl: 1    BD Belkys 2nd Gen Pen Needle 32 gauge x 5/32\" needle, Use with insulin 3x a day, Disp: 300 each, Rfl: 3    blood-glucose sensor (Dexcom G7 Sensor) device, Change every 10 days, Disp: 9 each, Rfl: 3    empagliflozin (Jardiance) 10 mg, Take 1 tablet (10 mg) by mouth once daily., Disp: , Rfl:     famotidine (Pepcid) 40 mg tablet, Take 1 tablet (40 mg) by mouth once daily in the evening.  Take before meals., Disp: 90 tablet, Rfl: 1    FreeStyle Reg 2 Sensor kit, Use as instructed (Patient not taking: Reported on 9/16/2024), Disp: 6 each, Rfl: 3    insulin aspart (NovoLOG Flexpen U-100 Insulin) 100 unit/mL (3 mL) pen, Take two times daily with meals as directed Up to 50 Units total daily, Disp: 45 mL, Rfl: 3    insulin glargine (Lantus) 100 unit/mL (3 mL) pen, Inject 20 Units under the skin once daily. Take as directed per insulin instructions., Disp: 18 mL, Rfl: 3    lisinopril 20 mg tablet, Take 1 tablet (20 mg) by mouth once daily., Disp: 90 tablet, Rfl: 1    metFORMIN (Glucophage) 1,000 mg tablet, TAKE 1 TABLET TWICE A DAY, Disp: 180 tablet, Rfl: 3    pen needle, diabetic (BD Belkys 2nd Gen Pen Needle) 32 gauge x 5/32\" needle, , Disp: , Rfl:     semaglutide (Ozempic) 2 mg/dose (8 mg/3 mL) pen injector, Inject 2 mg under the skin 1 (one) time per week in the early morning.., Disp: 9 mL, Rfl: 3    tadalafil (Cialis) 20 mg tablet, Take 1 tablet (20 mg) by mouth once daily as needed for erectile dysfunction., Disp: 12 tablet, Rfl: 11    tadalafil (Cialis) 5 mg " tablet, Take 1 tablet (5 mg) by mouth once daily., Disp: 30 tablet, Rfl: 11    tamsulosin (Flomax) 0.4 mg 24 hr capsule, Take 1 capsule (0.4 mg) by mouth once daily., Disp: , Rfl:     testosterone 20.25 mg/1.25 gram (1.62 %) gel in metered-dose pump, Place 2 Pump on the skin once daily. 1 pump to each shoulder, Disp: 75 g, Rfl: 3    testosterone 20.25 mg/1.25 gram (1.62 %) gel in metered-dose pump, Place 2 Pump on the skin once daily. 1 pump to each shoulder, Disp: 75 g, Rfl: 3      SOCIAL HISTORY:  Patient  reports that he has never smoked. He has never used smokeless tobacco. He reports that he does not currently use alcohol. He reports that he does not use drugs.   Social History     Socioeconomic History    Marital status:      Spouse name: Not on file    Number of children: Not on file    Years of education: Not on file    Highest education level: Not on file   Occupational History    Not on file   Tobacco Use    Smoking status: Never    Smokeless tobacco: Never   Substance and Sexual Activity    Alcohol use: Not Currently    Drug use: Never    Sexual activity: Not on file   Other Topics Concern    Not on file   Social History Narrative    Not on file     Social Drivers of Health     Financial Resource Strain: Not on file   Food Insecurity: Not on file   Transportation Needs: Not on file   Physical Activity: Not on file   Stress: Not on file   Social Connections: Not on file   Intimate Partner Violence: Not on file   Housing Stability: Not on file       FAMILY HISTORY:  Family History   Problem Relation Name Age of Onset    Diabetes Father      Cancer Maternal Grandfather         REVIEW OF SYSTEMS:  Constitutional: Negative for fever and chills. Denies anorexia, weight loss.  Eyes: Negative for visual disturbance.   Respiratory: Negative for shortness of breath.    Cardiovascular: Negative for chest pain.   Gastrointestinal: Negative for nausea and vomiting.   Genitourinary: See interval history  above.  Skin: Negative for rash.   Neurological: Negative for dizziness and numbness.   Psychiatric/Behavioral: Negative for confusion and decreased concentration.     PHYSICAL EXAM:  There were no vitals taken for this visit.  Constitutional: Patient appears well-developed and well-nourished. No distress.    Head: Normocephalic and atraumatic.    Neck: Normal range of motion.    Cardiovascular: Normal rate.    Pulmonary/Chest: Effort normal. No respiratory distress.   Abdominal: soft, NT, ND  Musculoskeletal: Normal range of motion.    Neurological: Alert and oriented to person, place, and time.  Psychiatric: Normal mood and affect. Behavior is normal. Thought content normal.      LABORATORY REVIEW:     Lab Results   Component Value Date    BUN 16 06/06/2024    CREATININE 0.96 06/06/2024    EGFR 90 06/06/2024     06/06/2024    K 4.5 06/06/2024     06/06/2024    CO2 27 06/06/2024    CALCIUM 9.1 06/06/2024      Lab Results   Component Value Date    WBC 5.9 06/06/2024    RBC 5.48 06/06/2024    HGB 15.0 06/06/2024    HCT 47.8 10/08/2024    MCV 92 06/06/2024    MCH 27.4 06/06/2024    MCHC 29.8 (L) 06/06/2024    RDW 13.3 06/06/2024     06/06/2024        Lab Results   Component Value Date    PSA <0.10 10/08/2024    PSA <0.10 08/16/2024    PSA <0.10 04/29/2024    PSA <0.10 02/20/2024    PSA 4.17 (H) 06/01/2023    PSA 3.3 05/10/2022    PSA 1.95 05/18/2021    PSA 1.8 01/06/2021    PSA 5.17 (H) 11/09/2020    PSA 2.09 05/23/2020    PSA 1.74 11/02/2019    PSA 1.97 03/09/2019         Assessment:      1. Benign prostatic hyperplasia, unspecified whether lower urinary tract symptoms present  Measure post void residual      2. Prostate cancer (Multi)  PSA      3. Erectile dysfunction after radical prostatectomy  tadalafil (Cialis) 5 mg tablet    tadalafil (Cialis) 20 mg tablet            Plan:   Reviewed and interpreted patient's PSA trend, which remains undetectable.  His PVR today is 0 cc, and is emptying well.    Counseled patient on functional recovery after surgery and that he's improving   Continues daily 5mg cialis and 20mg Cialis PRN(30-40 min prior to sexual activity).   Will transition to q6m surveillance   RTC in 6 months    31  minutes total spent on patient's care today; >50% time spent on counseling/coordination of care  Scribe Attestation  By signing my name below, Gissel PEOPLES Scribe   attest that this documentation has been prepared under the direction and in the presence of Russell Soni MD.

## 2024-11-27 ENCOUNTER — PREP FOR PROCEDURE (OUTPATIENT)
Dept: OPHTHALMOLOGY | Facility: CLINIC | Age: 64
End: 2024-11-27

## 2024-11-27 ENCOUNTER — APPOINTMENT (OUTPATIENT)
Dept: OPHTHALMOLOGY | Facility: CLINIC | Age: 64
End: 2024-11-27
Payer: COMMERCIAL

## 2024-11-27 DIAGNOSIS — E11.69 TYPE 2 DIABETES MELLITUS WITH MORBID OBESITY (MULTI): ICD-10-CM

## 2024-11-27 DIAGNOSIS — H25.812 COMBINED FORM OF AGE-RELATED CATARACT, LEFT EYE: Primary | ICD-10-CM

## 2024-11-27 DIAGNOSIS — E66.01 TYPE 2 DIABETES MELLITUS WITH MORBID OBESITY (MULTI): ICD-10-CM

## 2024-11-27 DIAGNOSIS — Z96.1 PSEUDOPHAKIA, RIGHT EYE: ICD-10-CM

## 2024-11-27 PROCEDURE — 99203 OFFICE O/P NEW LOW 30 MIN: CPT | Performed by: OPHTHALMOLOGY

## 2024-11-27 RX ORDER — TETRACAINE HYDROCHLORIDE 5 MG/ML
1 SOLUTION OPHTHALMIC ONCE
OUTPATIENT
Start: 2024-11-27 | End: 2024-11-27

## 2024-11-27 RX ORDER — OFLOXACIN 3 MG/ML
1 SOLUTION/ DROPS OPHTHALMIC 4 TIMES DAILY
Qty: 5 ML | Refills: 1 | Status: SHIPPED | OUTPATIENT
Start: 2024-11-27 | End: 2024-12-05

## 2024-11-27 RX ORDER — KETOROLAC TROMETHAMINE 5 MG/ML
1 SOLUTION OPHTHALMIC 4 TIMES DAILY
Qty: 5 ML | Refills: 1 | Status: SHIPPED | OUTPATIENT
Start: 2024-11-27 | End: 2024-12-12

## 2024-11-27 RX ORDER — PREDNISOLONE ACETATE 10 MG/ML
1 SUSPENSION/ DROPS OPHTHALMIC 4 TIMES DAILY
Qty: 5 ML | Refills: 1 | Status: SHIPPED | OUTPATIENT
Start: 2024-11-27 | End: 2024-12-05

## 2024-11-27 RX ORDER — TROPICAMIDE 10 MG/ML
1 SOLUTION/ DROPS OPHTHALMIC
OUTPATIENT
Start: 2024-11-27 | End: 2024-11-27

## 2024-11-27 RX ORDER — PHENYLEPHRINE HYDROCHLORIDE 25 MG/ML
1 SOLUTION/ DROPS OPHTHALMIC
OUTPATIENT
Start: 2024-11-27 | End: 2024-11-27

## 2024-11-27 ASSESSMENT — REFRACTION_WEARINGRX
OD_AXIS: 090
OS_ADD: +2.50
OD_CYLINDER: +0.50
OD_SPHERE: -0.75
OD_ADD: +2.50
OS_CYLINDER: -3.50
OS_AXIS: 080
OS_SPHERE: -4.00
SPECS_TYPE: BIFOCAL

## 2024-11-27 ASSESSMENT — VISUAL ACUITY
OD_CC+: +1
CORRECTION_TYPE: GLASSES
OD_CC: 20/40
OS_CC: 20/20
OS_CC+: -3
OD_PH_CC+: -3
METHOD: SNELLEN - LINEAR
OD_PH_CC: 20/25

## 2024-11-27 ASSESSMENT — EXTERNAL EXAM - LEFT EYE: OS_EXAM: NORMAL

## 2024-11-27 ASSESSMENT — REFRACTION_MANIFEST
OS_AXIS: 075
METHOD_AUTOREFRACTION: 1
OS_SPHERE: -4.25
OD_CYLINDER: -2.00
OS_ADD: +2.50
OS_AXIS: 075
OD_AXIS: 095
OS_CYLINDER: -3.00
OS_SPHERE: -4.25
OD_ADD: +2.50
OS_CYLINDER: -3.75
OD_SPHERE: +0.75
OD_AXIS: 095
OD_CYLINDER: -2.25
OD_SPHERE: +1.25

## 2024-11-27 ASSESSMENT — CUP TO DISC RATIO
OS_RATIO: 0.2
OD_RATIO: 0.2

## 2024-11-27 ASSESSMENT — CONF VISUAL FIELD
OS_SUPERIOR_NASAL_RESTRICTION: 0
OD_NORMAL: 1
OD_INFERIOR_NASAL_RESTRICTION: 0
OS_SUPERIOR_TEMPORAL_RESTRICTION: 0
OD_SUPERIOR_NASAL_RESTRICTION: 0
OS_INFERIOR_TEMPORAL_RESTRICTION: 0
OS_NORMAL: 1
OS_INFERIOR_NASAL_RESTRICTION: 0
OD_SUPERIOR_TEMPORAL_RESTRICTION: 0
OD_INFERIOR_TEMPORAL_RESTRICTION: 0

## 2024-11-27 ASSESSMENT — TONOMETRY
IOP_METHOD: GOLDMANN APPLANATION
OS_IOP_MMHG: 18
OD_IOP_MMHG: 17

## 2024-11-27 ASSESSMENT — SLIT LAMP EXAM - LIDS
COMMENTS: NORMAL
COMMENTS: NORMAL

## 2024-11-27 ASSESSMENT — EXTERNAL EXAM - RIGHT EYE: OD_EXAM: NORMAL

## 2024-11-27 NOTE — PROGRESS NOTES
Assessment/Plan   Diagnoses and all orders for this visit:  Combined form of age-related cataract, left eye     Visually significant cataract OS.     Indication for cataract surgery: Input To potentially improve visual acuity and improve quality of life/reduce symptoms. Anisometropia with iol od  Based on a comprehensive eye exam performed today, a visually significant cataract appears to be the source of decreased vision, diminished quality of life, and impairment of activities of daily living.   Discussed option of cataract surgery vs observation. Patient  wishes to have cataract surgery at this time.   Discussed surgical procedure with patient.   As a result of cataract extraction, it is believed that the patient will experience improved vision. Discussed potential risks, benefits, and complications of cataract surgery including but not limited to pain, bleeding, infection, inflammation, edema, increased eye pressure, retinal tear/detachment, lens dislocation, ptosis, iris damage, need for additional surgery, need for glasses after surgery, loss of vision/loss of eye. Patient understands and wishes to proceed. All questions were answered.   Will schedule cataract surgery OS.   Discussed IOL options (standard monofocal, monofocal with monovision, toric, multifocal).   Lens chosen: standard monofocal distance target  Defer/decline toric/multifocal lens at this time.   Dominance:   Special considerations: TAMSULOSIN, poor dilation, plan hooks/TB  Best contact number: [phone]  Drops:   -Ketorolac and Ofloxacin 4x/day starting 1 day prior to surgery; Prednisolone acetate 1% 4x/day starting after surgery   Discussed that may potentially need glasses for best vision both at distance and at near.    I personally reviewed the lenstar measurements and will choose the lens accordingly.   Type 2 diabetes mellitus with morbid obesity (Multi)  -     Referral to Ophthalmology  Pseudophakia, right eye

## 2024-11-27 NOTE — H&P
History Of Present Illness  Ko Porter is a 64 y.o. male presenting with visually significant cataract .     Past Medical History  He has a past medical history of Achilles tendinitis (06/16/2023), Anxiety (06/16/2023), BPH (benign prostatic hyperplasia), Diabetes mellitus (Multi) (06/16/2023), ED (erectile dysfunction), GERD (gastroesophageal reflux disease), Hyperlipidemia, Hypertension, LEO (obstructive sleep apnea), and Prostate CA (Multi).    Surgical History  He has a past surgical history that includes Colonoscopy (10/02/2013); Appendectomy (01/22/2016); Other surgical history (Left, 10/30/2020); and Prostatectomy.     Social History  He reports that he has never smoked. He has never used smokeless tobacco. He reports that he does not currently use alcohol. He reports that he does not use drugs.     Allergies  Patient has no known allergies.    ROS  Patient denies ocular pain, redness, discharge, decreased vision, double vision, blind spots, flashes, or floaters.     Physical Exam  Not recorded          Assessment/Plan       Cataract extraction with lens implant              Ximena Still MD

## 2024-11-30 DIAGNOSIS — E11.69 TYPE 2 DIABETES MELLITUS WITH MORBID OBESITY (MULTI): Primary | ICD-10-CM

## 2024-11-30 DIAGNOSIS — E66.01 TYPE 2 DIABETES MELLITUS WITH MORBID OBESITY (MULTI): Primary | ICD-10-CM

## 2024-11-30 RX ORDER — EMPAGLIFLOZIN 10 MG/1
10 TABLET, FILM COATED ORAL DAILY
Qty: 90 TABLET | Refills: 3 | Status: SHIPPED | OUTPATIENT
Start: 2024-11-30

## 2024-12-04 ENCOUNTER — APPOINTMENT (OUTPATIENT)
Dept: OPHTHALMOLOGY | Facility: CLINIC | Age: 64
End: 2024-12-04
Payer: COMMERCIAL

## 2024-12-11 PROBLEM — H25.812 COMBINED FORM OF AGE-RELATED CATARACT, LEFT EYE: Status: ACTIVE | Noted: 2024-11-27

## 2024-12-27 ENCOUNTER — ANESTHESIA EVENT (OUTPATIENT)
Dept: OPERATING ROOM | Facility: CLINIC | Age: 64
End: 2024-12-27
Payer: COMMERCIAL

## 2024-12-27 ENCOUNTER — APPOINTMENT (OUTPATIENT)
Dept: SLEEP MEDICINE | Facility: CLINIC | Age: 64
End: 2024-12-27
Payer: COMMERCIAL

## 2024-12-30 ENCOUNTER — ANESTHESIA (OUTPATIENT)
Dept: OPERATING ROOM | Facility: CLINIC | Age: 64
End: 2024-12-30
Payer: COMMERCIAL

## 2024-12-30 ENCOUNTER — HOSPITAL ENCOUNTER (OUTPATIENT)
Facility: CLINIC | Age: 64
Setting detail: OUTPATIENT SURGERY
Discharge: HOME | End: 2024-12-30
Attending: OPHTHALMOLOGY | Admitting: OPHTHALMOLOGY
Payer: COMMERCIAL

## 2024-12-30 VITALS
HEIGHT: 64 IN | SYSTOLIC BLOOD PRESSURE: 123 MMHG | BODY MASS INDEX: 31.69 KG/M2 | DIASTOLIC BLOOD PRESSURE: 75 MMHG | OXYGEN SATURATION: 96 % | HEART RATE: 82 BPM | TEMPERATURE: 96.8 F | RESPIRATION RATE: 16 BRPM | WEIGHT: 185.63 LBS

## 2024-12-30 DIAGNOSIS — H25.812 COMBINED FORM OF AGE-RELATED CATARACT, LEFT EYE: Primary | ICD-10-CM

## 2024-12-30 PROBLEM — K21.9 GASTROESOPHAGEAL REFLUX DISEASE: Status: ACTIVE | Noted: 2024-12-30

## 2024-12-30 LAB — GLUCOSE BLD MANUAL STRIP-MCNC: 149 MG/DL (ref 74–99)

## 2024-12-30 PROCEDURE — 7100000009 HC PHASE TWO TIME - INITIAL BASE CHARGE: Performed by: OPHTHALMOLOGY

## 2024-12-30 PROCEDURE — 2500000005 HC RX 250 GENERAL PHARMACY W/O HCPCS: Performed by: OPHTHALMOLOGY

## 2024-12-30 PROCEDURE — 3600000003 HC OR TIME - INITIAL BASE CHARGE - PROCEDURE LEVEL THREE: Performed by: OPHTHALMOLOGY

## 2024-12-30 PROCEDURE — 2720000007 HC OR 272 NO HCPCS: Performed by: OPHTHALMOLOGY

## 2024-12-30 PROCEDURE — 3700000001 HC GENERAL ANESTHESIA TIME - INITIAL BASE CHARGE: Performed by: OPHTHALMOLOGY

## 2024-12-30 PROCEDURE — 3600000008 HC OR TIME - EACH INCREMENTAL 1 MINUTE - PROCEDURE LEVEL THREE: Performed by: OPHTHALMOLOGY

## 2024-12-30 PROCEDURE — 7100000010 HC PHASE TWO TIME - EACH INCREMENTAL 1 MINUTE: Performed by: OPHTHALMOLOGY

## 2024-12-30 PROCEDURE — A66984 PR REMV CATARACT EXTRACAP,INSERT LENS: Performed by: ANESTHESIOLOGY

## 2024-12-30 PROCEDURE — V2632 POST CHMBR INTRAOCULAR LENS: HCPCS | Performed by: OPHTHALMOLOGY

## 2024-12-30 PROCEDURE — A66984 PR REMV CATARACT EXTRACAP,INSERT LENS: Performed by: ANESTHESIOLOGIST ASSISTANT

## 2024-12-30 PROCEDURE — 2500000004 HC RX 250 GENERAL PHARMACY W/ HCPCS (ALT 636 FOR OP/ED): Performed by: OPHTHALMOLOGY

## 2024-12-30 PROCEDURE — 3700000002 HC GENERAL ANESTHESIA TIME - EACH INCREMENTAL 1 MINUTE: Performed by: OPHTHALMOLOGY

## 2024-12-30 PROCEDURE — 66984 XCAPSL CTRC RMVL W/O ECP: CPT | Performed by: OPHTHALMOLOGY

## 2024-12-30 PROCEDURE — 82947 ASSAY GLUCOSE BLOOD QUANT: CPT

## 2024-12-30 PROCEDURE — 2760000001 HC OR 276 NO HCPCS: Performed by: OPHTHALMOLOGY

## 2024-12-30 PROCEDURE — 2500000004 HC RX 250 GENERAL PHARMACY W/ HCPCS (ALT 636 FOR OP/ED): Performed by: ANESTHESIOLOGIST ASSISTANT

## 2024-12-30 RX ORDER — POVIDONE-IODINE 5 %
SOLUTION, NON-ORAL OPHTHALMIC (EYE) AS NEEDED
Status: DISCONTINUED | OUTPATIENT
Start: 2024-12-30 | End: 2024-12-30 | Stop reason: HOSPADM

## 2024-12-30 RX ORDER — LIDOCAINE HYDROCHLORIDE 10 MG/ML
0.1 INJECTION, SOLUTION EPIDURAL; INFILTRATION; INTRACAUDAL; PERINEURAL ONCE
Status: DISCONTINUED | OUTPATIENT
Start: 2024-12-30 | End: 2024-12-30 | Stop reason: HOSPADM

## 2024-12-30 RX ORDER — HYDROMORPHONE HYDROCHLORIDE 1 MG/ML
0.5 INJECTION, SOLUTION INTRAMUSCULAR; INTRAVENOUS; SUBCUTANEOUS EVERY 5 MIN PRN
Status: DISCONTINUED | OUTPATIENT
Start: 2024-12-30 | End: 2024-12-30 | Stop reason: HOSPADM

## 2024-12-30 RX ORDER — ACETAMINOPHEN 325 MG/1
650 TABLET ORAL EVERY 4 HOURS PRN
Status: DISCONTINUED | OUTPATIENT
Start: 2024-12-30 | End: 2024-12-30 | Stop reason: HOSPADM

## 2024-12-30 RX ORDER — TETRACAINE HYDROCHLORIDE 5 MG/ML
1 SOLUTION OPHTHALMIC ONCE
Status: COMPLETED | OUTPATIENT
Start: 2024-12-30 | End: 2024-12-30

## 2024-12-30 RX ORDER — PHENYLEPHRINE HYDROCHLORIDE 25 MG/ML
1 SOLUTION/ DROPS OPHTHALMIC
Status: COMPLETED | OUTPATIENT
Start: 2024-12-30 | End: 2024-12-30

## 2024-12-30 RX ORDER — ONDANSETRON HYDROCHLORIDE 2 MG/ML
4 INJECTION, SOLUTION INTRAVENOUS ONCE AS NEEDED
Status: DISCONTINUED | OUTPATIENT
Start: 2024-12-30 | End: 2024-12-30 | Stop reason: HOSPADM

## 2024-12-30 RX ORDER — EPINEPHRINE 1 MG/ML
INJECTION INTRAMUSCULAR; INTRAVENOUS; SUBCUTANEOUS AS NEEDED
Status: DISCONTINUED | OUTPATIENT
Start: 2024-12-30 | End: 2024-12-30 | Stop reason: HOSPADM

## 2024-12-30 RX ORDER — SODIUM CHLORIDE, SODIUM LACTATE, POTASSIUM CHLORIDE, CALCIUM CHLORIDE 600; 310; 30; 20 MG/100ML; MG/100ML; MG/100ML; MG/100ML
100 INJECTION, SOLUTION INTRAVENOUS CONTINUOUS
Status: DISCONTINUED | OUTPATIENT
Start: 2024-12-30 | End: 2024-12-30 | Stop reason: HOSPADM

## 2024-12-30 RX ORDER — ALBUTEROL SULFATE 0.83 MG/ML
2.5 SOLUTION RESPIRATORY (INHALATION) ONCE AS NEEDED
Status: DISCONTINUED | OUTPATIENT
Start: 2024-12-30 | End: 2024-12-30 | Stop reason: HOSPADM

## 2024-12-30 RX ORDER — FENTANYL CITRATE 50 UG/ML
INJECTION, SOLUTION INTRAMUSCULAR; INTRAVENOUS AS NEEDED
Status: DISCONTINUED | OUTPATIENT
Start: 2024-12-30 | End: 2024-12-30

## 2024-12-30 RX ORDER — TROPICAMIDE 10 MG/ML
1 SOLUTION/ DROPS OPHTHALMIC
Status: COMPLETED | OUTPATIENT
Start: 2024-12-30 | End: 2024-12-30

## 2024-12-30 RX ORDER — TETRACAINE HYDROCHLORIDE 5 MG/ML
SOLUTION OPHTHALMIC AS NEEDED
Status: DISCONTINUED | OUTPATIENT
Start: 2024-12-30 | End: 2024-12-30 | Stop reason: HOSPADM

## 2024-12-30 RX ORDER — DROPERIDOL 2.5 MG/ML
0.62 INJECTION, SOLUTION INTRAMUSCULAR; INTRAVENOUS ONCE AS NEEDED
Status: DISCONTINUED | OUTPATIENT
Start: 2024-12-30 | End: 2024-12-30 | Stop reason: HOSPADM

## 2024-12-30 RX ORDER — IPRATROPIUM BROMIDE 0.5 MG/2.5ML
500 SOLUTION RESPIRATORY (INHALATION) ONCE
Status: DISCONTINUED | OUTPATIENT
Start: 2024-12-30 | End: 2024-12-30 | Stop reason: HOSPADM

## 2024-12-30 RX ORDER — MIDAZOLAM HYDROCHLORIDE 1 MG/ML
INJECTION, SOLUTION INTRAMUSCULAR; INTRAVENOUS AS NEEDED
Status: DISCONTINUED | OUTPATIENT
Start: 2024-12-30 | End: 2024-12-30

## 2024-12-30 RX ORDER — OXYCODONE HYDROCHLORIDE 5 MG/1
5 TABLET ORAL EVERY 4 HOURS PRN
Status: DISCONTINUED | OUTPATIENT
Start: 2024-12-30 | End: 2024-12-30 | Stop reason: HOSPADM

## 2024-12-30 RX ORDER — HYDROMORPHONE HYDROCHLORIDE 1 MG/ML
0.25 INJECTION, SOLUTION INTRAMUSCULAR; INTRAVENOUS; SUBCUTANEOUS EVERY 5 MIN PRN
Status: DISCONTINUED | OUTPATIENT
Start: 2024-12-30 | End: 2024-12-30 | Stop reason: HOSPADM

## 2024-12-30 RX ADMIN — FENTANYL CITRATE 25 MCG: 0.05 INJECTION, SOLUTION INTRAMUSCULAR; INTRAVENOUS at 10:16

## 2024-12-30 RX ADMIN — TETRACAINE HYDROCHLORIDE 1 DROP: 5 SOLUTION OPHTHALMIC at 09:05

## 2024-12-30 RX ADMIN — TROPICAMIDE 1 DROP: 10 SOLUTION/ DROPS OPHTHALMIC at 09:05

## 2024-12-30 RX ADMIN — PHENYLEPHRINE HYDROCHLORIDE 1 DROP: 25 SOLUTION/ DROPS OPHTHALMIC at 09:10

## 2024-12-30 RX ADMIN — TROPICAMIDE 1 DROP: 10 SOLUTION/ DROPS OPHTHALMIC at 09:15

## 2024-12-30 RX ADMIN — PHENYLEPHRINE HYDROCHLORIDE 1 DROP: 25 SOLUTION/ DROPS OPHTHALMIC at 09:15

## 2024-12-30 RX ADMIN — TROPICAMIDE 1 DROP: 10 SOLUTION/ DROPS OPHTHALMIC at 09:10

## 2024-12-30 RX ADMIN — PHENYLEPHRINE HYDROCHLORIDE 1 DROP: 25 SOLUTION/ DROPS OPHTHALMIC at 09:05

## 2024-12-30 RX ADMIN — SODIUM CHLORIDE: 9 INJECTION, SOLUTION INTRAVENOUS at 10:13

## 2024-12-30 RX ADMIN — MIDAZOLAM 2 MG: 1 INJECTION INTRAMUSCULAR; INTRAVENOUS at 10:16

## 2024-12-30 ASSESSMENT — ENCOUNTER SYMPTOMS
ALLERGIC/IMMUNOLOGIC NEGATIVE: 1
GASTROINTESTINAL NEGATIVE: 1
ENDOCRINE NEGATIVE: 1
EYES NEGATIVE: 1
CONSTITUTIONAL NEGATIVE: 1
NEUROLOGICAL NEGATIVE: 1

## 2024-12-30 ASSESSMENT — PAIN - FUNCTIONAL ASSESSMENT
PAIN_FUNCTIONAL_ASSESSMENT: 0-10
PAIN_FUNCTIONAL_ASSESSMENT: 0-10

## 2024-12-30 ASSESSMENT — PAIN SCALES - GENERAL
PAINLEVEL_OUTOF10: 0 - NO PAIN
PAINLEVEL_OUTOF10: 0 - NO PAIN

## 2024-12-30 NOTE — H&P
History Of Present Illness  Ko Porter is a 64 y.o. male presenting with visually significant cataract      Past Medical History  Past Medical History:   Diagnosis Date    Achilles tendinitis 06/16/2023    Anxiety 06/16/2023    BPH (benign prostatic hyperplasia)     Cataract     Coronary artery disease     Diabetes mellitus (Multi) 06/16/2023    A1C= 6.9% on 11/6/23    ED (erectile dysfunction)     GERD (gastroesophageal reflux disease)     Glaucoma     H/O difficult intubation     Hyperlipidemia     Hypertension     Hypogonadism, male     LEO (obstructive sleep apnea)     Not currently on CPAP    Prostate CA (Multi)     Type 2 diabetes mellitus        Surgical History  Past Surgical History:   Procedure Laterality Date    APPENDECTOMY  01/22/2016    Appendectomy    COLONOSCOPY  10/02/2013    Complete Colonoscopy    FINGER SURGERY Right     index    HEMORRHOID SURGERY      HERNIA REPAIR      OTHER SURGICAL HISTORY Left 10/30/2020    Cataract surgery    PROSTATE BIOPSY      PROSTATECTOMY          Social History  He reports that he has never smoked. He has never used smokeless tobacco. He reports that he does not currently use alcohol. He reports that he does not use drugs.    Family History  Family History   Problem Relation Name Age of Onset    Diabetes Father      Cancer Maternal Grandfather          Allergies  Patient has no known allergies.    Review of Systems   Constitutional: Negative.    HENT: Negative.     Eyes: Negative.    Gastrointestinal: Negative.    Endocrine: Negative.    Genitourinary: Negative.    Allergic/Immunologic: Negative.    Neurological: Negative.         Physical Exam  Cardiovascular:      Rate and Rhythm: Normal rate and regular rhythm.   Pulmonary:      Effort: Pulmonary effort is normal.          Last Recorded Vitals  There were no vitals taken for this visit.    Relevant Results             Assessment/Plan   Assessment & Plan  Combined form of age-related cataract, left  eye      Cataract extraction with lens implant        I spent 5 minutes in the professional and overall care of this patient.      Ximena Still MD

## 2024-12-30 NOTE — OP NOTE
Extraction Cataract with Insertion Intraocular Lens (L) Operative Note     Date: 2024  OR Location: [facility] [place] [facility] [place]    Name: Ko Porter : 1960, Age: 64 y.o., MRN: [id number], Sex: male    Diagnosis  Pre-op Diagnosis      * Combined form of age-related cataract, left eye [H25.812] Post-op Diagnosis     * Combined form of age-related cataract, left eye [H25.812]     Procedures  Extraction Cataract with Insertion Intraocular Lens  00109 - DC XCAPSL CTRC RMVL INSJ IO LENS PROSTH W/O ECP      Surgeons      * Ximena Still - Primary    Resident/Fellow/Other Assistant:  Surgeons and Role:  * No surgeons found with a matching role *    Staff:   Braulioulator: Sofi Palmer Person: Anali    Anesthesia Staff: Anesthesiologist: Everett Coats DO  C-AA: CHARLI Esposito    Procedure Summary  Anesthesia: Monitor Anesthesia Care  ASA: III  Estimated Blood Loss: 0mL  Intra-op Medications:   Administrations occurring from 1030 to 1115 on 24:   Medication Name Total Dose   NaCl 0.9 % bolus Cannot be calculated              Anesthesia Record               Intraprocedure I/O Totals          Intake    NaCl 0.9 % bolus 10.00 mL    Total Intake 10 mL          Specimen: No specimens collected              Drains and/or Catheters:   Urethral Catheter Latex 20 Fr. (Active)       Tourniquet Times:         Implants:  Implants       Type Name Action Serial No.       16.0 DIOPTER, TECNIS SIMPLICITY EYHANCE IOL PRELOADED DELIVERY SYSTEM, MODEL DIB00 Implanted               Findings: visually significant cataract     Indications: Ko Porter is an 64 y.o. male who is having surgery for Combined form of age-related cataract, left eye [H25.812].     The patient was seen in the preoperative area. The risks, benefits, complications, treatment options, non-operative alternatives, expected recovery and outcomes were discussed with the patient. The possibilities of reaction to medication, pulmonary aspiration, injury to  surrounding structures, bleeding, recurrent infection, the need for additional procedures, failure to diagnose a condition, and creating a complication requiring transfusion or operation were discussed with the patient. The patient concurred with the proposed plan, giving informed consent.  The site of surgery was properly noted/marked if necessary per policy. The patient has been actively warmed in preoperative area. Preoperative antibiotics are not indicated. Venous thrombosis prophylaxis are not indicated.    Procedure Details: The patient was prepped and draped in a sterile fashion. the lid speculum was placed in the  eye.  a paracentesis was made and preservative free lidocaine was injected. Trypan blue was injected for anterior capsule staining due to poor red reflex. Viscoat was injected into to anterior chamber. A temporal incision was made. A capsulorhexus was made. Hydrodissection was done. Phacoemulsification was used to sculpt, divide and remove the nucleus. Cortex was removed with irrigation/aspiration. Provisc was injected into the capsular bag. The lens implant, model DI B00 diopter     16.0  was injected and rotated into position. Viscoelastic was removed and the incision was hydrated and  demonstrated to be watertight. The patient tolerated the procedure well and there were no complications. Follow up in 1 day CDE:  7.65   Complications:  None; patient tolerated the procedure well.    Disposition: PACU - hemodynamically stable.  Condition: stable                 Additional Details:     Attending Attestation:     Ximena Still  Phone Number: [phone]

## 2024-12-30 NOTE — ANESTHESIA PREPROCEDURE EVALUATION
Patient: Ko Porter    Procedure Information       Date/Time: 12/30/24 1030    Procedure: Extraction Cataract with Insertion Intraocular Lens (Left: Eye)    [place] [facility] [place] [facility] [place] / [facility] [place] [facility] [place]    Surgeons: Ximena Still MD            Relevant Problems   Anesthesia   (+) Difficult intubation      Cardiac   (+) Coronary artery disease involving native coronary artery of native heart without angina pectoris (Pt denies)   (+) Hyperlipidemia   (+) Hypertension      Pulmonary   (+) LEO (obstructive sleep apnea) (Does not wear CPAP, has appt to see a doctor about it)      Neuro (within normal limits)      GI   (+) Gastroesophageal reflux disease (Well controlled on meds)      /Renal   (+) Enlarged prostate with lower urinary tract symptoms (LUTS)   (+) Malignant neoplasm of prostate (Multi)      Liver (within normal limits)      Endocrine   (+) Obesity   (+) Type 2 diabetes mellitus with hyperlipidemia (Multi)      Hematology (within normal limits)      Musculoskeletal (within normal limits)      HEENT (within normal limits)      ID (within normal limits)      Skin (within normal limits)      GYN (within normal limits)       Clinical information reviewed:   Tobacco  Allergies  Meds   Med Hx  Surg Hx   Fam Hx  Soc Hx        NPO Detail:  NPO/Void Status  Date of Last Liquid: 12/29/24  Time of Last Liquid: 2100  Date of Last Solid: 12/29/24  Time of Last Solid: 2100  Last Intake Type: Clear fluids; Food         Physical Exam    Airway  Mallampati: III  TM distance: >3 FB  Neck ROM: full  Comments: Pt states he has hx of difficult airway and needed a child's size tube in the past   Cardiovascular    Dental - normal exam     Pulmonary    Abdominal        Anesthesia Plan    History of general anesthesia?: yes  History of complications of general anesthesia?: no    ASA 3     MAC     intravenous induction   Anesthetic plan and risks discussed with patient.    Plan discussed with CAA.

## 2024-12-30 NOTE — ANESTHESIA POSTPROCEDURE EVALUATION
Patient: Ko Porter    Procedure Summary       Date: 12/30/24 Room / Location: [facility] [place] [facility] / [facility] [place] [facility]    Anesthesia Start: 1013 Anesthesia Stop: 1040    Procedure: Extraction Cataract with Insertion Intraocular Lens (Left: Eye) Diagnosis:       Combined form of age-related cataract, left eye      (Combined form of age-related cataract, left eye [H25.812])    Surgeons: Ximena Still MD Responsible Provider: Everett Coats DO    Anesthesia Type: MAC ASA Status: 3            Anesthesia Type: MAC    Vitals Value Taken Time   /75 12/30/24 1111   Temp 36 °C (96.8 °F) 12/30/24 1111   Pulse 82 12/30/24 1111   Resp 16 12/30/24 1111   SpO2 96 % 12/30/24 1111       Anesthesia Post Evaluation    Patient location during evaluation: PACU  Patient participation: complete - patient participated  Level of consciousness: awake  Pain management: satisfactory to patient  Multimodal analgesia pain management approach  Airway patency: patent  Cardiovascular status: acceptable  Respiratory status: acceptable  Hydration status: acceptable  Postoperative Nausea and Vomiting: none    No notable events documented.

## 2024-12-30 NOTE — DISCHARGE INSTRUCTIONS
Use all 3 drops 4 times today  No heavy lifting  Glasses or sheild during the day, wear the sheild to bed  Keep eye dry    Ofloxacin (tan top) antibiotic, use 4 times a day   Stop after 1 week  Ketorolac   (Gray top)  Use 4 times a day until gone  Prednisilone (pink top)  Use 4 times a day for one week, then decrease to          3 times a day for one week, then decrease to         2 times a day for one week, then decrease to         1 time a day for 1 week    Shield at bedtime for 1 week  Call [phone] for any concerns  In emergency or after hours, call [phone] to have on call paged

## 2024-12-31 ENCOUNTER — APPOINTMENT (OUTPATIENT)
Dept: OPHTHALMOLOGY | Facility: CLINIC | Age: 64
End: 2024-12-31
Payer: COMMERCIAL

## 2024-12-31 DIAGNOSIS — Z96.1 PSEUDOPHAKIA, LEFT EYE: Primary | ICD-10-CM

## 2024-12-31 PROCEDURE — 99024 POSTOP FOLLOW-UP VISIT: CPT | Performed by: OPHTHALMOLOGY

## 2024-12-31 ASSESSMENT — ENCOUNTER SYMPTOMS
MUSCULOSKELETAL NEGATIVE: 0
NEUROLOGICAL NEGATIVE: 0
EYES NEGATIVE: 1
PSYCHIATRIC NEGATIVE: 0
ENDOCRINE NEGATIVE: 0
HEMATOLOGIC/LYMPHATIC NEGATIVE: 0
CONSTITUTIONAL NEGATIVE: 0
GASTROINTESTINAL NEGATIVE: 0
RESPIRATORY NEGATIVE: 0
CARDIOVASCULAR NEGATIVE: 0
ALLERGIC/IMMUNOLOGIC NEGATIVE: 0

## 2024-12-31 ASSESSMENT — VISUAL ACUITY
OS_PH_SC+: -1
OS_SC: 20/40
OD_CC: 20/30
OD_CC+: -2
METHOD: SNELLEN - LINEAR
OS_PH_SC: 20/30

## 2024-12-31 ASSESSMENT — TONOMETRY
OD_IOP_MMHG: 16
OS_IOP_MMHG: 17
IOP_METHOD: TONOPEN

## 2024-12-31 ASSESSMENT — EXTERNAL EXAM - RIGHT EYE: OD_EXAM: NORMAL

## 2024-12-31 ASSESSMENT — SLIT LAMP EXAM - LIDS
COMMENTS: NORMAL
COMMENTS: NORMAL

## 2024-12-31 ASSESSMENT — EXTERNAL EXAM - LEFT EYE: OS_EXAM: NORMAL

## 2024-12-31 NOTE — PROGRESS NOTES
Assessment/Plan   Diagnoses and all orders for this visit:  Pseudophakia, left eye  Stable 1 day post op  Use drops as directed  Shield at bedtime for 1 week  Follow up as scheduled 2-4 weeks  Call for any change in vision, redness, pain

## 2024-12-31 NOTE — PATIENT INSTRUCTIONS
Use all 3 drops 4 times a day for 1 week  Ofloxacin (tan top) antibiotic, use 4 times a day   Stop after 1 week  Ketorolac   (Gray top)  Use 4 times a day until gone  Prednisilone (pink top)  Use 4 times a day for one week, then decrease to          3 times a day for one week, then decrease to         2 times a day for one week, then decrease to         1 time a day for 1 week    Shield at bedtime for 1 week  Call [phone] for any concerns  In emergency or after hours, call [phone] to have on call paged

## 2025-01-02 ENCOUNTER — OFFICE VISIT (OUTPATIENT)
Dept: SLEEP MEDICINE | Facility: CLINIC | Age: 65
End: 2025-01-02
Payer: COMMERCIAL

## 2025-01-02 VITALS
HEART RATE: 79 BPM | OXYGEN SATURATION: 98 % | DIASTOLIC BLOOD PRESSURE: 70 MMHG | SYSTOLIC BLOOD PRESSURE: 123 MMHG | BODY MASS INDEX: 31.51 KG/M2 | HEIGHT: 64 IN | WEIGHT: 184.6 LBS

## 2025-01-02 DIAGNOSIS — G47.33 OSA (OBSTRUCTIVE SLEEP APNEA): Primary | ICD-10-CM

## 2025-01-02 PROCEDURE — 3078F DIAST BP <80 MM HG: CPT | Performed by: NURSE PRACTITIONER

## 2025-01-02 PROCEDURE — 99214 OFFICE O/P EST MOD 30 MIN: CPT | Performed by: NURSE PRACTITIONER

## 2025-01-02 PROCEDURE — 3074F SYST BP LT 130 MM HG: CPT | Performed by: NURSE PRACTITIONER

## 2025-01-02 PROCEDURE — 4010F ACE/ARB THERAPY RXD/TAKEN: CPT | Performed by: NURSE PRACTITIONER

## 2025-01-02 PROCEDURE — 3008F BODY MASS INDEX DOCD: CPT | Performed by: NURSE PRACTITIONER

## 2025-01-02 PROCEDURE — 1036F TOBACCO NON-USER: CPT | Performed by: NURSE PRACTITIONER

## 2025-01-02 ASSESSMENT — SLEEP AND FATIGUE QUESTIONNAIRES
HOW LIKELY ARE YOU TO NOD OFF OR FALL ASLEEP IN A CAR, WHILE STOPPED FOR A FEW MINUTES IN TRAFFIC: MODERATE CHANCE OF DOZING
HOW LIKELY ARE YOU TO NOD OFF OR FALL ASLEEP WHILE SITTING AND READING: SLIGHT CHANCE OF DOZING
DIFFICULTY_FALLING_ASLEEP: MODERATE
WORRIED_DISTRESSED_DUE_TO_SLEEP: A LITTLE
SLEEP_PROBLEM_INTERFERES_DAILY_ACTIVITIES: SOMEWHAT
SATISFACTION_WITH_CURRENT_SLEEP_PATTERN: SATISFIED
ESS-CHAD TOTAL SCORE: 14
DIFFICULTY_STAYING_ASLEEP: MODERATE
HOW LIKELY ARE YOU TO NOD OFF OR FALL ASLEEP WHILE SITTING QUIETLY AFTER LUNCH WITHOUT ALCOHOL: SLIGHT CHANCE OF DOZING
WAKING_TOO_EARLY: MODERATE
HOW LIKELY ARE YOU TO NOD OFF OR FALL ASLEEP WHEN YOU ARE A PASSENGER IN A CAR FOR AN HOUR WITHOUT A BREAK: SLIGHT CHANCE OF DOZING
HOW LIKELY ARE YOU TO NOD OFF OR FALL ASLEEP WHILE SITTING AND TALKING TO SOMEONE: SLIGHT CHANCE OF DOZING
SITING INACTIVE IN A PUBLIC PLACE LIKE A CLASS ROOM OR A MOVIE THEATER: HIGH CHANCE OF DOZING
HOW LIKELY ARE YOU TO NOD OFF OR FALL ASLEEP WHILE WATCHING TV: MODERATE CHANCE OF DOZING
SLEEP_PROBLEM_NOTICEABLE_TO_OTHERS: MUCH
HOW LIKELY ARE YOU TO NOD OFF OR FALL ASLEEP WHILE LYING DOWN TO REST IN THE AFTERNOON WHEN CIRCUMSTANCES PERMIT: HIGH CHANCE OF DOZING

## 2025-01-02 ASSESSMENT — PATIENT HEALTH QUESTIONNAIRE - PHQ9
2. FEELING DOWN, DEPRESSED OR HOPELESS: NOT AT ALL
1. LITTLE INTEREST OR PLEASURE IN DOING THINGS: NOT AT ALL
SUM OF ALL RESPONSES TO PHQ9 QUESTIONS 1 AND 2: 0

## 2025-01-02 ASSESSMENT — ENCOUNTER SYMPTOMS
LOSS OF SENSATION IN FEET: 0
OCCASIONAL FEELINGS OF UNSTEADINESS: 0
DEPRESSION: 0

## 2025-01-02 ASSESSMENT — PAIN SCALES - GENERAL: PAINLEVEL_OUTOF10: 0-NO PAIN

## 2025-01-02 NOTE — ASSESSMENT & PLAN NOTE
Reviewed HSAT results in detail, supports mild sleep apnea with an RADHA 3% of 14. Predominately supine sleep on night of the testing, minimal time in non-supine sleep  -Ko prefers to work on weight loss with Ozempic and implement side sleeping for the time being  -Discussed option to start CPAP at later date if needed. Recommended considering restarting if weight loss does not occur, snoring continues or worsens, daytime sleepiness does not improve, and nocturia does not less. He verbalized understanding. He is not interested in resuming today despite discussion re: recent weight gain. Feels his symptoms are manageable and his comorbids are now under better control.   -Plan for follow up PRN

## 2025-01-02 NOTE — PROGRESS NOTES
Patient: Juan Porter    [id number]  : 1960 -- AGE 64 y.o.    Provider: JOSÉ Saha     Location [facility]   Service Date: 2025              [facility]  Followup Visit Note    HISTORY OF PRESENT ILLNESS       HISTORY OF PRESENT ILLNESS   Juan Porter is a 64 y.o. male with past medical history of anxiety, obesity, cataract, CAD, DM2, ED, BPH, hyperlipidemia, HTN, obesity, LEO who presents to a [facility] for followup. JUAN is a . He works second shift.     PAST SLEEP HISTORY    JUAN has had a sleep study completed in 2015 showing no significant sleep apnea with an AHI of 1.3. Arousal index of 11.1. The SpO2 rosie of 80%. The PLM index was 0. The supine REM AHI was 64.4. Recommendation was for PAP titration.   PAP titration was completed on 2016. CPAP was titrated from 5 to 10. Recommendation was for auto PAP 8-20.    Home sleep study was completed in 2024 showing mild sleep apnea with an RDI 3% of 14.4, RADHA 4% of 5.4 and SpO2 rosie of 89%.  Recommendation is to consider treatment for sleep apnea.     CURRENT SLEEP HISTORY    On today's visit, the patient reports resuming Ozempic after prostate surgery. Restarted recently and back to his max dose. Hoping to lose weight. Notes he has gained some after the holidays.   Wife notes his snores at times when he is extra tired. She notes it happens more on his back v his side. He prefers to side sleep and has been since we spoke last.   Notes he feels sleepy at times- attributes to work schedule. Does not always fall asleep quickly when he gets home, sometimes has chores to do, watches TV for a while. Can go to bed if he needs to be somewhere in the morning. Will set an alarm to wake up if he has appointments or errands. He notes he sometimes will sleep until 10 AM or so if he does not have any where to be. Does not doze off  "as easily if that is the case. Falls asleep easily, stays asleep well.   Wakes around 6 or 7 to use the restroom. Does not nap intentionally later in the day.   Had old CPAP. Did not register with [employer] for recall. Notes he has since discarded it.     RLS Followup:  denies    Insomnia follow up:  Bedtime: 1 AM  Sleep Latency: right away  Awakenings: 1x per night bathroom around 6 AM  Wake time: 6 or 7 AM  Naps:   none    ESS: 14   MAKENZIE: 14  FOSQ: 35    REVIEW OF SYSTEMS     REVIEW OF SYSTEMS  Review of Systems   All other systems reviewed and are negative.      ALLERGIES AND MEDICATIONS     ALLERGIES  No Known Allergies    MEDICATIONS  Current Outpatient Medications   Medication Sig Dispense Refill    aspirin 81 mg EC tablet Take 1 tablet (81 mg) by mouth once daily.      atorvastatin (Lipitor) 10 mg tablet Take 1 tablet (10 mg) by mouth once daily. 90 tablet 1    BD Belkys 2nd Gen Pen Needle 32 gauge x 5/32\" needle Use with insulin 3x a day 300 each 3    blood-glucose sensor (Dexcom G7 Sensor) device Change every 10 days 9 each 3    empagliflozin (Jardiance) 10 mg TAKE 1 TABLET BY MOUTH EVERY DAY 90 tablet 3    famotidine (Pepcid) 40 mg tablet Take 1 tablet (40 mg) by mouth once daily in the evening.  Take before meals. 90 tablet 1    insulin aspart (NovoLOG Flexpen U-100 Insulin) 100 unit/mL (3 mL) pen Take two times daily with meals as directed Up to 50 Units total daily 45 mL 3    insulin glargine (Lantus) 100 unit/mL (3 mL) pen Inject 20 Units under the skin once daily. Take as directed per insulin instructions. 18 mL 3    lisinopril 20 mg tablet Take 1 tablet (20 mg) by mouth once daily. 90 tablet 1    metFORMIN (Glucophage) 1,000 mg tablet TAKE 1 TABLET TWICE A  tablet 3    pen needle, diabetic (BD Belkys 2nd Gen Pen Needle) 32 gauge x 5/32\" needle       semaglutide (Ozempic) 2 mg/dose (8 mg/3 mL) pen injector Inject 2 mg under the skin 1 (one) time per week in the early morning.. 9 mL 3    tadalafil " (Cialis) 5 mg tablet Take 1 tablet (5 mg) by mouth once daily. 30 tablet 11    tamsulosin (Flomax) 0.4 mg 24 hr capsule Take 1 capsule (0.4 mg) by mouth once daily.      testosterone 20.25 mg/1.25 gram (1.62 %) gel in metered-dose pump Place 2 Pump on the skin once daily. 1 pump to each shoulder 75 g 3    testosterone 20.25 mg/1.25 gram (1.62 %) gel in metered-dose pump Place 2 Pump on the skin once daily. 1 pump to each shoulder 75 g 3    FreeStyle Reg 2 Sensor kit Use as instructed (Patient not taking: Reported on 1/2/2025) 6 each 3    tadalafil (Cialis) 20 mg tablet Take 1 tablet (20 mg) by mouth once daily as needed for erectile dysfunction. (Patient not taking: Reported on 1/2/2025) 12 tablet 11     No current facility-administered medications for this visit.       PPAST MEDICAL HISTORY  Past Medical History:   Diagnosis Date    Achilles tendinitis 06/16/2023    Anxiety 06/16/2023    BPH (benign prostatic hyperplasia)     Cataract     Coronary artery disease     Diabetes mellitus (Multi) 06/16/2023    A1C= 6.9% on 11/6/23    ED (erectile dysfunction)     GERD (gastroesophageal reflux disease)     Glaucoma     H/O difficult intubation     Hyperlipidemia     Hypertension     Hypogonadism, male     LEO (obstructive sleep apnea)     Not currently on CPAP    Prostate CA (Multi)     Type 2 diabetes mellitus        PAST SURGICAL HISTORY:  Past Surgical History:   Procedure Laterality Date    APPENDECTOMY  01/22/2016    Appendectomy    COLONOSCOPY  10/02/2013    Complete Colonoscopy    FINGER SURGERY Right     index    HEMORRHOID SURGERY      HERNIA REPAIR      OTHER SURGICAL HISTORY Left 10/30/2020    Cataract surgery    PROSTATE BIOPSY      PROSTATECTOMY         FAMILY HISTORY  Family History   Problem Relation Name Age of Onset    Diabetes Father      Cancer Maternal Grandfather         FAMILY HISTORY: No changes since previous visit. Otherwise non-contributory as charted.       SOCIAL HISTORY  He  reports that he  "has never smoked. He has never used smokeless tobacco. He reports that he does not currently use alcohol. He reports that he does not use drugs.       PHYSICAL EXAM     VITAL SIGNS: /70 (BP Location: Left arm, Patient Position: Sitting, BP Cuff Size: Adult)   Pulse 79   Ht 1.626 m (5' 4\")   Wt 83.7 kg (184 lb 9.6 oz)   SpO2 98%   BMI 31.69 kg/m²      PREVIOUS WEIGHTS:  Wt Readings from Last 3 Encounters:   01/02/25 83.7 kg (184 lb 9.6 oz)   12/30/24 84.2 kg (185 lb 10 oz)   10/29/24 78.9 kg (174 lb)       Physical Exam  Physical Exam   Constitutional: Alert and oriented, cooperative, no obvious distress   HEENT: Non icteric or anemic, EOM WNL bilaterally   Neck: Supple, no JVD, no goiter, no adenopathy, no rigidity  Chest: CTA bilaterally, no wheezing, crackles, rubs   Cardiac: RRR, S1 and S2, no murmur, rub, thrill   Abdomen: Obese, Soft, nontender, no masses, no organomegaly   Extremities: No clubbing, no LL edema   Neuromuscular: Cranial nerves grossly intact, no focal deficits      RESULTS/DATA     Bicarbonate (mmol/L)   Date Value   06/06/2024 27   03/05/2024 27   01/09/2024 27       PAP Adherence:  Not currently using CPAP    ASSESSMENT/PLAN     Mr. Porter is a 64 y.o. male and He returns in followup to the [facility] for LEO.    Problem List and Orders  Problem List Items Addressed This Visit             ICD-10-CM    LEO (obstructive sleep apnea) - Primary G47.33     Reviewed HSAT results in detail, supports mild sleep apnea with an RADHA 3% of 14. Predominately supine sleep on night of the testing, minimal time in non-supine sleep  -Ko prefers to work on weight loss with Ozempic and implement side sleeping for the time being  -Discussed option to start CPAP at later date if needed. Recommended considering restarting if weight loss does not occur, snoring continues or worsens, daytime sleepiness does not improve, and nocturia does not less. He verbalized " understanding. He is not interested in resuming today despite discussion re: recent weight gain. Feels his symptoms are manageable and his comorbids are now under better control.   -Plan for follow up PRN         BMI 31.0-31.9,adult Z68.31     Weight loss recommended  Currently taking Ozempic per Dr. Freeman  Follow up with PCP / endo for recommendations                Disposition    Return to clinic in 6 months

## 2025-01-02 NOTE — ASSESSMENT & PLAN NOTE
Weight loss recommended  Currently taking Ozempic per Dr. Freeman  Follow up with PCP / endo for recommendations

## 2025-01-02 NOTE — PATIENT INSTRUCTIONS
Continue with positional therapy  Consider CPAP    Follow up with me as needed        [facility]  [facility] [place] [address]  [address] [address]       NAME: Ko Porter   DATE:  01/02/25    DIAGNOSIS:   No diagnosis found.    Thank you for coming to the Sleep Medicine Clinic today! Your sleep medicine provider today was: Gloria Pierson, APRN-CNP Below is a summary of your treatment plan, other important information, and our contact numbers:    TREATMENT PLAN:   - Follow-up in PRN months.  - If not already done, sign up for 'My Chart' and send prescription requests or messages through this    Obstructive sleep apnea (LEO): LEO is a sleep disorder where your upper airway muscles relax during sleep and the airway intermittently and repetitively narrows and collapses leading to blocked airway (apnea) which, in turn, can disrupt breathing in sleep, lower oxygen levels while you sleep and cause night time wakings. Because apnea may cause higher carbon dioxide or low oxygen levels, untreated LEO can lead to heart arrhythmia, elevation of blood pressure, and make it harder for the body to consolidate memory and metabolize (leading to higher blood sugars at night).   Frequent partial arousals occur during sleep resulting in sleep deprivation and daytime sleepiness. LEO is associated with an increased risk of cardiovascular disease, stroke, hypertension, and insulin resistance. Moreover, untreated LEO with excessive daytime sleepiness can increase the risk of motor vehicular accidents.    Some conservative strategies for LEO are:   Positional therapy - Avoid sleeping on your back.   Healthy diet, exercise, and optimizing weight encouraged.   Avoid alcohol late in the evening as it can make sleep apnea worse.     Safety: Avoid driving and operating heavy equipment while sleepy. Drowsy driving may lead to life-threatening motor vehicle  accidents.     Common treatment options for sleep apnea include weight management, positional therapy, Positive Airway Therapy (PAP) therapy, oral appliance therapy, hypoglossal nerve stimulation, and select airway surgeries.     Instructions - Common LEO Recs: - For your sleep apnea, continue to use your PAP every night and use it whenever you are sleeping.   - Avoid alcohol or sedatives several hours prior to sleeping.   - Get additional supplies for your PAP (e.g., mask, hose, filters) every 3 months or as your insurance allows from your [employer] company. Replacement cushions for your PAP mask can be requested monthly if airseals are an issue.  - Remember to clean your mask, tubings, and water chamber regularly as instructed.  - Avoid driving or operating heavy machinery when drowsy. A person driving while sleepy is five (5) times more likely to have an accident. If you feel sleepy, pull over and take a short power nap (sleep for less than 30 minutes). Otherwise, ask somebody to drive you.    EASY WAYS TO IMPROVE YOUR SLEEP:  1. Go to bed and wake up at the same time every day.   Aim for 8 hours but some people need less, some need more.   Get out of bed if you are not sleeping.   Limit naps to 20 min or less.   2. Expose yourself to daylight and/ or bright light in the morning.   Go outside or spend time near a window each morning.   You can use a light box (found on [employer]) if you wake before the sunrise.   Limit light exposure in the evenings (including electronic usage).   Try meditation, reading, stretching, deep breathing, warm shower or bath, or yoga nidra as part of your bedtime routine. There are many great FREE, videos or audio tracks on [employer]/ [employer], etc for guidance.  3. Exercise, in some form, EVERY day, but not too close to bedtime. Consider making this part of your routine at the start of your day, followed by a cool shower.  4. Eat meals at roughly the same time every day. Make sure you are  prioritizing fruits, vegetables, whole grains, lean proteins.  5. Time your caffeine intake. Make sure you are not drinking caffeine within 8 to 12 hours prior to your bedtime.   6. Avoid marijuana, alcohol, and nicotine. They will reduce sleep quality in any quantity.  7. Learn to manage anxiety. Psychology services at  can be reached at [phone] to schedule an appointment.     IMPORTANT INFORMATION:     Call 911 for medical emergencies.  Our offices are generally open from Monday-Friday, 9 am - 5 pm.  If you need to get in touch with me, you may either call me and my team(number is below) or you can use DriftToIt.  If a referral for a test, for CPAP, or for another specialist was made, and you have not heard about scheduling this within a week, please call scheduling at [phone]).  If you are unable to make your appointment for clinic or an overnight study, kindly call the office at least 48 hours in advance to cancel and reschedule.  If you are on CPAP, please bring your device's card to each clinic appointment unless told otherwise by your provider.  There are no supporting services by either the sleep doctors or their staff on weekends and Holidays, or after 5 PM on weekdays.   If you have been asked to come to a sleep study, make sure you bring toiletries, a comfy pillow, and any nighttime medications that you may regularly take. Also be sure to eat dinner before you arrive. We generally do not provide meals.      PRESCRIPTIONS:  We require 7 days advanced notice for prescription refills. If we do not receive the request in this time, we cannot guarantee that your medication will be refilled in time. Please contact the sleep nurses listed below for refills or request via DriftToIt.     IMPORTANT PHONE NUMBERS:   Sleep Medicine Clinic Fax: [phone]  Appointments (for Pediatric Sleep Clinic): [phone] - option 1  Appointments (for Adult Sleep Clinic): [phone] - option  2  Appointments (For Sleep Studies): [phone] - option 3  Behavioral Sleep Medicine: [phone]  Sleep Surgery: [phone]  ENT (Otolaryngology): [phone]  Headache Clinic (Neurology): [phone]  Neurology: [phone]  Psychiatry: [phone]  Pulmonary Function Testing (PFT) Center: [phone]  Pulmonary Medicine: [phone]  T[facility] (DME): [phone]  [facility] (DME): [phone]  [facility] [phone]    Our Adult Sleep Medicine Team (Please do not hesitate to call the office or sleep nurse with any questions between appointments):    Adult Sleep Nurses (Jaki Herrera, RN and Tamiko Titus RN):  For clinical questions and refilling prescriptions: [phone]  Email sleep diaries and other documents at: [email or web address]    Adult Sleep Medicine Secretaries:  Sofi Parson (For Gillian/Bueno/Krise/Strohl/Yeh): P: [phone]  F: [phone]  Conrad Campbell (Kenna)Office: [phone] option 4 Office Fax: [phone]  Genoveva Helm (For [place]): P: [phone]  Fax: [phone]  Renita Marie (For Jurcevic/Blank): P: [phone]  F: [phone]  Marisel Li (For Rena): P: [phone]  F: [phone]  Heidi Coley (For Marly/Ivan/Zafrancisca): P: [phone]  F: [phone]  Freda Woody (For Barbosa/Perez): P: [phone]  F: [phone]     Adult Sleep Medicine Advanced Practice Providers:  Jai Gonzales ([place], [place])  Sandra Love ([facility])  Gloria Pierson CNP ([place], [place], [place])  Mary Winslow CNP ([place], [place], [place])  Fatemeh Chase ([facility])  Analia Perez CNP ([facility])      Our Sleep Testing Center (STC) Locations:  Our team will contact you to schedule your sleep study, however, you can contact us as follow:  Main Phone Line (scheduling only): [phone] ([phone]), option 3  Adult and Pediatric Locations  [facility]  "[place] (6 years and older): [facility] [place] - 4th floor ([address]) After hours line: [phone]  [facility] (Main campus: All ages): [facility], 6th floor. After hours line: [phone]   [place] (5 years and older; younger considered on case-by-case basis): [address]; [address], [address]. Scheduling  After hours line: [phone]   [place] (6 years and older): [address]; [address]; [address]   [place] (6 years and older): [address], [address]  After hours line: [phone]   [place] (13 years and older) in [place]: [address], 2nd floor  After hours line: [phone]   [place] (13 year and older): [address]  After hours line: [phone] (Home studies out of [facility])    Adult Only Locations:   [place] (18 years and older): [address], 2nd floor   [place] (18 years and older): [address]; 4th floor  After hours line: [phone]  [facility] (18 years and older) at [place]: [id number] [place] [address]  After hours line: [phone]        CONTACTING YOUR SLEEP MEDICINE PROVIDER:  Send a message directly to your provider through \"My Chart\", which is the email service through your  Records Account: [email or web address]   Call [phone] and leave a message. One of the administrative assistants will forward the message to your sleep medicine provider through \"My Chart\" and/or email.     Your sleep medicine provider for this visit was: JOSÉ Saha    In the event that you are running more than 15 minutes late to your appointment, I will kindly ask you to reschedule.       "

## 2025-01-14 ENCOUNTER — APPOINTMENT (OUTPATIENT)
Dept: OPHTHALMOLOGY | Facility: CLINIC | Age: 65
End: 2025-01-14
Payer: COMMERCIAL

## 2025-01-14 DIAGNOSIS — Z96.1 PSEUDOPHAKIA, LEFT EYE: Primary | ICD-10-CM

## 2025-01-14 PROCEDURE — 99024 POSTOP FOLLOW-UP VISIT: CPT | Performed by: OPHTHALMOLOGY

## 2025-01-14 ASSESSMENT — VISUAL ACUITY
OD_SC+: +2
OD_SC: 20/25
OS_SC+: -1
OS_SC: 20/20
METHOD: SNELLEN - LINEAR

## 2025-01-14 ASSESSMENT — TONOMETRY
OD_IOP_MMHG: 21
OS_IOP_MMHG: 29
OS_IOP_MMHG: 34
OD_IOP_MMHG: 16
IOP_METHOD: GOLDMANN APPLANATION
IOP_METHOD: GOLDMANN APPLANATION

## 2025-01-14 ASSESSMENT — REFRACTION_MANIFEST
OS_ADD: +2.50
OS_SPHERE: +1.00
OD_ADD: +2.50
OD_AXIS: 090
OS_CYLINDER: -0.75
OD_SPHERE: +1.00
OS_AXIS: 090
OD_CYLINDER: -2.50

## 2025-01-14 ASSESSMENT — EXTERNAL EXAM - RIGHT EYE: OD_EXAM: NORMAL

## 2025-01-14 ASSESSMENT — EXTERNAL EXAM - LEFT EYE: OS_EXAM: NORMAL

## 2025-01-14 ASSESSMENT — SLIT LAMP EXAM - LIDS
COMMENTS: NORMAL
COMMENTS: NORMAL

## 2025-01-14 NOTE — PROGRESS NOTES
Assessment/Plan   Diagnoses and all orders for this visit:  Pseudophakia, left eye  Probably steroid response elev IOP  Taper off pred  Re check IOP 2-3 weeks  Glasses prescription given to patient today

## 2025-01-21 ENCOUNTER — LAB (OUTPATIENT)
Dept: LAB | Facility: LAB | Age: 65
End: 2025-01-21
Payer: COMMERCIAL

## 2025-01-21 ENCOUNTER — APPOINTMENT (OUTPATIENT)
Dept: ENDOCRINOLOGY | Facility: CLINIC | Age: 65
End: 2025-01-21
Payer: COMMERCIAL

## 2025-01-21 VITALS
HEART RATE: 80 BPM | DIASTOLIC BLOOD PRESSURE: 74 MMHG | BODY MASS INDEX: 32.06 KG/M2 | SYSTOLIC BLOOD PRESSURE: 122 MMHG | HEIGHT: 64 IN | WEIGHT: 187.8 LBS | RESPIRATION RATE: 16 BRPM

## 2025-01-21 DIAGNOSIS — E66.01 TYPE 2 DIABETES MELLITUS WITH MORBID OBESITY (MULTI): ICD-10-CM

## 2025-01-21 DIAGNOSIS — E11.69 TYPE 2 DIABETES MELLITUS WITH MORBID OBESITY (MULTI): Primary | ICD-10-CM

## 2025-01-21 DIAGNOSIS — Z12.5 PROSTATE CANCER SCREENING: ICD-10-CM

## 2025-01-21 DIAGNOSIS — E66.01 TYPE 2 DIABETES MELLITUS WITH MORBID OBESITY (MULTI): Primary | ICD-10-CM

## 2025-01-21 DIAGNOSIS — R79.89 LOW TESTOSTERONE: ICD-10-CM

## 2025-01-21 DIAGNOSIS — C61 PROSTATE CANCER (MULTI): ICD-10-CM

## 2025-01-21 DIAGNOSIS — E78.2 MIXED HYPERLIPIDEMIA: ICD-10-CM

## 2025-01-21 DIAGNOSIS — I10 HYPERTENSION, UNSPECIFIED TYPE: ICD-10-CM

## 2025-01-21 DIAGNOSIS — E11.69 TYPE 2 DIABETES MELLITUS WITH MORBID OBESITY (MULTI): ICD-10-CM

## 2025-01-21 LAB
ALBUMIN SERPL BCP-MCNC: 4.4 G/DL (ref 3.4–5)
ALP SERPL-CCNC: 94 U/L (ref 33–136)
ALT SERPL W P-5'-P-CCNC: 11 U/L (ref 10–52)
ANION GAP SERPL CALC-SCNC: 14 MMOL/L (ref 10–20)
AST SERPL W P-5'-P-CCNC: 13 U/L (ref 9–39)
BILIRUB SERPL-MCNC: 0.7 MG/DL (ref 0–1.2)
BUN SERPL-MCNC: 16 MG/DL (ref 6–23)
CALCIUM SERPL-MCNC: 9.8 MG/DL (ref 8.6–10.6)
CHLORIDE SERPL-SCNC: 103 MMOL/L (ref 98–107)
CO2 SERPL-SCNC: 29 MMOL/L (ref 21–32)
CREAT SERPL-MCNC: 1 MG/DL (ref 0.5–1.3)
EGFRCR SERPLBLD CKD-EPI 2021: 85 ML/MIN/1.73M*2
EST. AVERAGE GLUCOSE BLD GHB EST-MCNC: 140 MG/DL
GLUCOSE SERPL-MCNC: 167 MG/DL (ref 74–99)
HBA1C MFR BLD: 6.5 %
HCT VFR BLD AUTO: 51.9 % (ref 41–52)
LH SERPL-ACNC: 1.2 IU/L
POTASSIUM SERPL-SCNC: 4.5 MMOL/L (ref 3.5–5.3)
PROLACTIN SERPL-MCNC: 7.4 UG/L (ref 2–18)
PROT SERPL-MCNC: 7.2 G/DL (ref 6.4–8.2)
PSA SERPL-MCNC: <0.1 NG/ML
SODIUM SERPL-SCNC: 141 MMOL/L (ref 136–145)
TESTOST SERPL-MCNC: 300 NG/DL (ref 240–1000)

## 2025-01-21 PROCEDURE — 3074F SYST BP LT 130 MM HG: CPT | Performed by: INTERNAL MEDICINE

## 2025-01-21 PROCEDURE — 84146 ASSAY OF PROLACTIN: CPT

## 2025-01-21 PROCEDURE — 83036 HEMOGLOBIN GLYCOSYLATED A1C: CPT

## 2025-01-21 PROCEDURE — 1036F TOBACCO NON-USER: CPT | Performed by: INTERNAL MEDICINE

## 2025-01-21 PROCEDURE — 4010F ACE/ARB THERAPY RXD/TAKEN: CPT | Performed by: INTERNAL MEDICINE

## 2025-01-21 PROCEDURE — 83002 ASSAY OF GONADOTROPIN (LH): CPT

## 2025-01-21 PROCEDURE — 85014 HEMATOCRIT: CPT

## 2025-01-21 PROCEDURE — 80053 COMPREHEN METABOLIC PANEL: CPT

## 2025-01-21 PROCEDURE — 84403 ASSAY OF TOTAL TESTOSTERONE: CPT

## 2025-01-21 PROCEDURE — 3008F BODY MASS INDEX DOCD: CPT | Performed by: INTERNAL MEDICINE

## 2025-01-21 PROCEDURE — 84153 ASSAY OF PSA TOTAL: CPT

## 2025-01-21 PROCEDURE — 99214 OFFICE O/P EST MOD 30 MIN: CPT | Performed by: INTERNAL MEDICINE

## 2025-01-21 PROCEDURE — 3078F DIAST BP <80 MM HG: CPT | Performed by: INTERNAL MEDICINE

## 2025-01-21 ASSESSMENT — ENCOUNTER SYMPTOMS
FEVER: 0
LIGHT-HEADEDNESS: 0
DIZZINESS: 0
CHILLS: 0
DIARRHEA: 0
NAUSEA: 0
VOMITING: 0
SHORTNESS OF BREATH: 0

## 2025-01-21 NOTE — PROGRESS NOTES
Endocrinology: Follow up visit  Subjective   Patient ID: Ko Porter is a 64 y.o. male who presents for Diabetes (Type 2 ), Hyperlipidemia, and Hypertension.    PCP: Enmanuel Lui MD    HPI  Dm2:  Metformin 2000 every day  Jardiance 10 mg  Ozempic 2 mg   Mdi   20 lantus  Meal 23/18     Since last visit sugars are great.  Tir on clarity 77% with avg 142 over 90 and very low rates of lows  Feeling ok.   Trying to work on eating and activity.   Working on wife on going to gym a bit more regularly and working on weekend eating.     Checks sugars 4x a day  Injects insulin 3x a day  Currently utilizing cgm to check sugars     Review of Systems   Constitutional:  Negative for chills and fever.   Respiratory:  Negative for shortness of breath.    Gastrointestinal:  Negative for diarrhea, nausea and vomiting.   Endocrine: Negative for cold intolerance and heat intolerance.   Neurological:  Negative for dizziness and light-headedness.       Patient Active Problem List   Diagnosis    Age-related nuclear cataract of left eye    Chalazion of right lower eyelid    Chalazion of right upper eyelid    Elevated PSA    Enlarged prostate with lower urinary tract symptoms (LUTS)    Erectile dysfunction after radical prostatectomy    Gross hematuria    Hyperlipidemia    Hypertension    Hypogonadism male    Impacted cerumen of left ear    Ingrowing toenail    Meibomian gland dysfunction (MGD)    Obesity    S/P cataract surgery    Phimosis    LEO (obstructive sleep apnea)    Urinary incontinence    Vitamin D insufficiency    Cataract, right    Cortical cataract    Vitreous floaters of right eye    Elevated coronary artery calcium score    Coronary artery calcification    Difficult intubation    Malignant neoplasm of prostate (Multi)    Healthcare maintenance    Type 2 diabetes mellitus with hyperlipidemia (Multi)    Coronary artery disease involving native coronary artery of native heart without angina pectoris    Colon cancer  "screening    Combined form of age-related cataract, left eye    Gastroesophageal reflux disease    BMI 31.0-31.9,adult        Home Meds:  Current Outpatient Medications   Medication Instructions    aspirin 81 mg EC tablet 1 tablet, Daily    atorvastatin (LIPITOR) 10 mg, oral, Daily    BD Belkys 2nd Gen Pen Needle 32 gauge x 5/32\" needle Use with insulin 3x a day    blood-glucose sensor (Dexcom G7 Sensor) device Change every 10 days    famotidine (PEPCID) 40 mg, oral, Daily before evening meal    FreeStyle Reg 2 Sensor kit Use as instructed    insulin aspart (NovoLOG Flexpen U-100 Insulin) 100 unit/mL (3 mL) pen Take two times daily with meals as directed Up to 50 Units total daily    insulin glargine (LANTUS) 20 Units, subcutaneous, Daily, Take as directed per insulin instructions.    Jardiance 10 mg, oral, Daily    lisinopril 20 mg, oral, Daily    metFORMIN (GLUCOPHAGE) 1,000 mg, oral, 2 times daily    Ozempic 2 mg, subcutaneous, Weekly (0600)    pen needle, diabetic (BD Belkys 2nd Gen Pen Needle) 32 gauge x 5/32\" needle No dose, route, or frequency recorded.    tadalafil (CIALIS) 5 mg, oral, Daily    tadalafil (CIALIS) 20 mg, oral, Daily PRN    tamsulosin (FLOMAX) 0.4 mg, Daily    testosterone 20.25 mg/1.25 gram (1.62 %) gel in metered-dose pump 2 Pump, transdermal, Daily, 1 pump to each shoulder    testosterone 20.25 mg/1.25 gram (1.62 %) gel in metered-dose pump 2 Pump, transdermal, Daily, 1 pump to each shoulder        No Known Allergies     Objective   Vitals:    01/21/25 0923   BP: 122/74   Pulse: 80   Resp: 16      Vitals:    01/21/25 0923   Weight: 85.2 kg (187 lb 12.8 oz)      Body mass index is 32.24 kg/m².   Physical Exam  Constitutional:       Appearance: Normal appearance. He is overweight.   HENT:      Head: Normocephalic and atraumatic.   Neck:      Thyroid: No thyroid mass, thyromegaly or thyroid tenderness.   Cardiovascular:      Rate and Rhythm: Normal rate and regular rhythm.      Heart sounds: No " "murmur heard.     No gallop.   Pulmonary:      Effort: Pulmonary effort is normal.      Breath sounds: Normal breath sounds.   Abdominal:      Palpations: Abdomen is soft.      Comments: benign   Neurological:      General: No focal deficit present.      Mental Status: He is alert and oriented to person, place, and time.      Deep Tendon Reflexes: Reflexes are normal and symmetric.   Psychiatric:         Behavior: Behavior is cooperative.         Labs:  Lab Results   Component Value Date    HGBA1C 7.3 (A) 09/16/2024      No results found for: \"PR1\", \"THYROIDPAB\", \"TSI\"     Assessment/Plan   Assessment & Plan  Type 2 diabetes mellitus with morbid obesity (Multi)  Sugars look great   Encouraged him to keep up efforts  Clarity code added today  Orders:    Comprehensive Metabolic Panel; Future    Hemoglobin A1C; Future    Mixed hyperlipidemia  Stably on statin  Hypertension, unspecified type    Bp excellent        Electronically signed by:  Deloris Freeman MD 01/21/25 9:24 AM              "

## 2025-01-29 ENCOUNTER — APPOINTMENT (OUTPATIENT)
Dept: UROLOGY | Facility: CLINIC | Age: 65
End: 2025-01-29
Payer: COMMERCIAL

## 2025-01-29 VITALS — WEIGHT: 178.9 LBS | BODY MASS INDEX: 30.54 KG/M2 | HEIGHT: 64 IN | TEMPERATURE: 97.3 F

## 2025-01-29 DIAGNOSIS — C61 MALIGNANT NEOPLASM OF PROSTATE (MULTI): Primary | ICD-10-CM

## 2025-01-29 DIAGNOSIS — E29.1 HYPOGONADISM IN MALE: ICD-10-CM

## 2025-01-29 LAB
POC APPEARANCE, URINE: CLEAR
POC BILIRUBIN, URINE: NEGATIVE
POC BLOOD, URINE: NEGATIVE
POC COLOR, URINE: YELLOW
POC GLUCOSE, URINE: ABNORMAL MG/DL
POC KETONES, URINE: NEGATIVE MG/DL
POC LEUKOCYTES, URINE: NEGATIVE
POC NITRITE,URINE: NEGATIVE
POC PH, URINE: 5.5 PH
POC PROTEIN, URINE: ABNORMAL MG/DL
POC SPECIFIC GRAVITY, URINE: >=1.03
POC UROBILINOGEN, URINE: 0.2 EU/DL

## 2025-01-29 PROCEDURE — 3008F BODY MASS INDEX DOCD: CPT | Performed by: PHYSICIAN ASSISTANT

## 2025-01-29 PROCEDURE — 3044F HG A1C LEVEL LT 7.0%: CPT | Performed by: PHYSICIAN ASSISTANT

## 2025-01-29 PROCEDURE — 99214 OFFICE O/P EST MOD 30 MIN: CPT | Performed by: PHYSICIAN ASSISTANT

## 2025-01-29 PROCEDURE — 81003 URINALYSIS AUTO W/O SCOPE: CPT | Performed by: PHYSICIAN ASSISTANT

## 2025-01-29 PROCEDURE — 4010F ACE/ARB THERAPY RXD/TAKEN: CPT | Performed by: PHYSICIAN ASSISTANT

## 2025-01-29 RX ORDER — TESTOSTERONE 20.25 MG/1.25G
2 GEL TOPICAL DAILY
Qty: 75 G | Refills: 3 | Status: SHIPPED | OUTPATIENT
Start: 2025-01-29

## 2025-01-29 ASSESSMENT — ENCOUNTER SYMPTOMS
GASTROINTESTINAL NEGATIVE: 1
ENDOCRINE NEGATIVE: 1
RESPIRATORY NEGATIVE: 1
HEMATOLOGIC/LYMPHATIC NEGATIVE: 1
EYES NEGATIVE: 1
ALLERGIC/IMMUNOLOGIC NEGATIVE: 1
PSYCHIATRIC NEGATIVE: 1
CARDIOVASCULAR NEGATIVE: 1
NEUROLOGICAL NEGATIVE: 1
MUSCULOSKELETAL NEGATIVE: 1
CONSTITUTIONAL NEGATIVE: 1

## 2025-01-29 ASSESSMENT — PAIN SCALES - GENERAL: PAINLEVEL_OUTOF10: 0-NO PAIN

## 2025-01-29 NOTE — PROGRESS NOTES
Subjective   Patient ID: Ko Porter is a 64 y.o. male who presents for Benign Prostatic Hypertrophy.  Benign Prostatic Hypertrophy  Irritative symptoms include urgency.     65 yo male with GG4 prostate cancer s/p RALP 1/8/24, ED on dialy cialis and cialis 20 mg PRN, Hypogonadism on Androgel 2 pumps a day seen for follow up.     Patient denies improvement of erection while on testosterone. He reports starting testosterone hoping to improve erections and orgasm.   He is using rubber and to aid with penetration, he can keep the erection, but he is not able to achieve orgasm.   Patient admits he is not waiting enough time for cialis 20 mg to work.     He denies any side effects from cialis or testosterone.     Patient reports urgency incontinence is improving. He has one accident a day usually if he sits too long. He is doing timed voiding every 2 hours and helped his symptoms.     Office Visit on 01/29/2025   Component Date Value Ref Range Status    POC Color, Urine 01/29/2025 Yellow  Straw, Yellow, Light-Yellow Final    POC Appearance, Urine 01/29/2025 Clear  Clear Final    POC Glucose, Urine 01/29/2025 100 (1+) (A)  NEGATIVE mg/dl Final    POC Bilirubin, Urine 01/29/2025 NEGATIVE  NEGATIVE Final    POC Ketones, Urine 01/29/2025 NEGATIVE  NEGATIVE mg/dl Final    POC Specific Gravity, Urine 01/29/2025 >=1.030  1.005 - 1.035 Final    POC Blood, Urine 01/29/2025 NEGATIVE  NEGATIVE Final    POC PH, Urine 01/29/2025 5.5  No Reference Range Established PH Final    POC Protein, Urine 01/29/2025 30 (1+) (A)  NEGATIVE mg/dl Final    POC Urobilinogen, Urine 01/29/2025 0.2  0.2, 1.0 EU/DL Final    Poc Nitrite, Urine 01/29/2025 NEGATIVE  NEGATIVE Final    POC Leukocytes, Urine 01/29/2025 NEGATIVE  NEGATIVE Final   Lab on 01/21/2025   Component Date Value Ref Range Status    Hematocrit 01/21/2025 51.9  41.0 - 52.0 % Final    Prolactin 01/21/2025 7.4  2.0 - 18.0 ug/L Final    Prostate Specific AG 01/21/2025 <0.10  <=4.00 ng/mL  Final    Testosterone 01/21/2025 300  240 - 1,000 ng/dL Final    Luteinizing Hormone 01/21/2025 1.2  IU/L Final    LH Reference Values  Follicular Phase          1.9-12.5 IU/L  Mid-Cycle                 8.7-76.3 IU/L  Luteal Phase              0.5-16.9 IU/L  Post Menopause            5.0-55.2 IU/L  Children                    0- 6.0 IU/L  Adult Male 18-70 years    1.5- 9.3 IU/L  Adult Male >70 years      3.1-34.6 IU/L    Glucose 01/21/2025 167 (H)  74 - 99 mg/dL Final    Sodium 01/21/2025 141  136 - 145 mmol/L Final    Potassium 01/21/2025 4.5  3.5 - 5.3 mmol/L Final    Chloride 01/21/2025 103  98 - 107 mmol/L Final    Bicarbonate 01/21/2025 29  21 - 32 mmol/L Final    Anion Gap 01/21/2025 14  10 - 20 mmol/L Final    Urea Nitrogen 01/21/2025 16  6 - 23 mg/dL Final    Creatinine 01/21/2025 1.00  0.50 - 1.30 mg/dL Final    eGFR 01/21/2025 85  >60 mL/min/1.73m*2 Final    Calculations of estimated GFR are performed using the 2021 CKD-EPI Study Refit equation without the race variable for the IDMS-Traceable creatinine methods.  https://jasn.asnjournals.org/content/early/2021/09/22/ASN.7077468226    Calcium 01/21/2025 9.8  8.6 - 10.6 mg/dL Final    Albumin 01/21/2025 4.4  3.4 - 5.0 g/dL Final    Alkaline Phosphatase 01/21/2025 94  33 - 136 U/L Final    Total Protein 01/21/2025 7.2  6.4 - 8.2 g/dL Final    AST 01/21/2025 13  9 - 39 U/L Final    Bilirubin, Total 01/21/2025 0.7  0.0 - 1.2 mg/dL Final    ALT 01/21/2025 11  10 - 52 U/L Final    Patients treated with Sulfasalazine may generate falsely decreased results for ALT.    Hemoglobin A1C 01/21/2025 6.5 (H)  See comment % Final    Estimated Average Glucose 01/21/2025 140  Not Established mg/dL Final   Admission on 12/30/2024, Discharged on 12/30/2024   Component Date Value Ref Range Status    POCT Glucose 12/30/2024 149 (H)  74 - 99 mg/dL Final     PVR 0  UA glucose 100, trace of protein    Review of Systems   Constitutional: Negative.    HENT: Negative.     Eyes:  Negative.    Respiratory: Negative.     Cardiovascular: Negative.    Gastrointestinal: Negative.    Endocrine: Negative.    Genitourinary:  Positive for urgency.   Musculoskeletal: Negative.    Skin: Negative.    Allergic/Immunologic: Negative.    Neurological: Negative.    Hematological: Negative.    Psychiatric/Behavioral: Negative.         Objective   Physical Exam  Constitutional:       General: He is not in acute distress.     Appearance: Normal appearance.   HENT:      Head: Normocephalic and atraumatic.      Nose: Nose normal.      Mouth/Throat:      Mouth: Mucous membranes are moist.   Pulmonary:      Effort: Pulmonary effort is normal.   Musculoskeletal:      Cervical back: Normal range of motion.   Neurological:      Mental Status: He is alert.         Assessment/Plan     ED  I advised continuing dialy cialis and take cialis 20 mg at least an hour prior to activity    Hypogonadism  Patient has only been on testosterone for a month. I advised continuing it for a few months to evaluate erections and orgasms  Will repeat labs in 2 months    Follow up in 2 months with prior labs or sooner as needed           Mery Beckham PA-C 01/29/25 9:05 AM

## 2025-01-30 DIAGNOSIS — I25.10 CORONARY ARTERY DISEASE INVOLVING NATIVE CORONARY ARTERY OF NATIVE HEART WITHOUT ANGINA PECTORIS: ICD-10-CM

## 2025-01-30 DIAGNOSIS — K21.9 GASTROESOPHAGEAL REFLUX DISEASE, UNSPECIFIED WHETHER ESOPHAGITIS PRESENT: ICD-10-CM

## 2025-01-31 DIAGNOSIS — E66.01 TYPE 2 DIABETES MELLITUS WITH MORBID OBESITY (MULTI): ICD-10-CM

## 2025-01-31 DIAGNOSIS — E11.69 TYPE 2 DIABETES MELLITUS WITH MORBID OBESITY (MULTI): ICD-10-CM

## 2025-01-31 RX ORDER — INSULIN GLARGINE 100 [IU]/ML
INJECTION, SOLUTION SUBCUTANEOUS
Qty: 20 ML | Refills: 3 | Status: SHIPPED | OUTPATIENT
Start: 2025-01-31

## 2025-02-03 ENCOUNTER — APPOINTMENT (OUTPATIENT)
Dept: OPHTHALMOLOGY | Facility: CLINIC | Age: 65
End: 2025-02-03
Payer: COMMERCIAL

## 2025-02-03 DIAGNOSIS — E11.9 CONTROLLED TYPE 2 DIABETES MELLITUS WITHOUT COMPLICATION, UNSPECIFIED WHETHER LONG TERM INSULIN USE (MULTI): ICD-10-CM

## 2025-02-03 DIAGNOSIS — Z96.1 PSEUDOPHAKIA, LEFT EYE: Primary | ICD-10-CM

## 2025-02-03 PROCEDURE — 99024 POSTOP FOLLOW-UP VISIT: CPT | Performed by: OPHTHALMOLOGY

## 2025-02-03 ASSESSMENT — EXTERNAL EXAM - LEFT EYE: OS_EXAM: NORMAL

## 2025-02-03 ASSESSMENT — VISUAL ACUITY
OS_CC+: -1
METHOD: SNELLEN - LINEAR
CORRECTION_TYPE: GLASSES
OS_CC: 20/20
OD_CC: 20/20

## 2025-02-03 ASSESSMENT — CONF VISUAL FIELD
OS_SUPERIOR_TEMPORAL_RESTRICTION: 0
METHOD: COUNTING FINGERS
OS_NORMAL: 1
OS_INFERIOR_TEMPORAL_RESTRICTION: 0
OD_INFERIOR_TEMPORAL_RESTRICTION: 0
OD_SUPERIOR_NASAL_RESTRICTION: 0
OS_SUPERIOR_NASAL_RESTRICTION: 0
OD_INFERIOR_NASAL_RESTRICTION: 0
OD_SUPERIOR_TEMPORAL_RESTRICTION: 0
OD_NORMAL: 1
OS_INFERIOR_NASAL_RESTRICTION: 0

## 2025-02-03 ASSESSMENT — SLIT LAMP EXAM - LIDS
COMMENTS: NORMAL
COMMENTS: NORMAL

## 2025-02-03 ASSESSMENT — ENCOUNTER SYMPTOMS
ALLERGIC/IMMUNOLOGIC NEGATIVE: 0
RESPIRATORY NEGATIVE: 0
ENDOCRINE NEGATIVE: 0
PSYCHIATRIC NEGATIVE: 0
HEMATOLOGIC/LYMPHATIC NEGATIVE: 0
EYES NEGATIVE: 1
MUSCULOSKELETAL NEGATIVE: 0
GASTROINTESTINAL NEGATIVE: 0
CARDIOVASCULAR NEGATIVE: 0
NEUROLOGICAL NEGATIVE: 0
CONSTITUTIONAL NEGATIVE: 0

## 2025-02-03 ASSESSMENT — EXTERNAL EXAM - RIGHT EYE: OD_EXAM: NORMAL

## 2025-02-03 ASSESSMENT — TONOMETRY
OD_IOP_MMHG: 15
OS_IOP_MMHG: 16
IOP_METHOD: GOLDMANN APPLANATION

## 2025-02-03 NOTE — PROGRESS NOTES
Assessment/Plan   Diagnoses and all orders for this visit:  Pseudophakia, left eye  Vision good, IOP normal    Controlled type 2 diabetes mellitus without complication, unspecified whether long term insulin use (Multi)  DFE 11/2025

## 2025-02-06 ENCOUNTER — APPOINTMENT (OUTPATIENT)
Dept: PRIMARY CARE | Facility: CLINIC | Age: 65
End: 2025-02-06
Payer: COMMERCIAL

## 2025-02-12 ENCOUNTER — TELEPHONE (OUTPATIENT)
Dept: ENDOCRINOLOGY | Facility: CLINIC | Age: 65
End: 2025-02-12
Payer: COMMERCIAL

## 2025-02-12 NOTE — TELEPHONE ENCOUNTER
Patient called in states that he is in the hospital currently for inflammation to his small bowel and would like to know if this is a side effect of ozempic and should he continue on ozempic?     Pt is still going through more testing to determine cause and treatment,

## 2025-02-13 ENCOUNTER — PATIENT OUTREACH (OUTPATIENT)
Dept: PRIMARY CARE | Facility: CLINIC | Age: 65
End: 2025-02-13
Payer: COMMERCIAL

## 2025-02-13 NOTE — PROGRESS NOTES
Discharge Facility:[facility]  Discharge Diagnosis:Abnormal CT of ABD  Admission Date:2/11/25  Discharge Date: 2/12/25    PCP Appointment Date:none available sent to pcp office  Specialist Appointment Date:   Hospital Encounter and Summary Linked: Yes  See discharge assessment below for further details    Wrap Up  Wrap Up Additional Comments: discused discharge patient stated that he is improving.provided contact information encouraged call with questions. (2/13/2025  9:33 AM)  Call End Time: 0937 (2/13/2025  9:33 AM)    Engagement  Call Start Time: 0933 (2/13/2025  9:33 AM)    Medications  Medications reviewed with patient/caregiver?: Yes (no new meds) (2/13/2025  9:33 AM)  Is the patient having any side effects they believe may be caused by any medication additions or changes?: No (2/13/2025  9:33 AM)  Does the patient have all medications ordered at discharge?: Not applicable (2/13/2025  9:33 AM)  Is the patient taking all medications as directed (includes completed medication regime)?: Yes (2/13/2025  9:33 AM)    Appointments  Does the patient have a primary care provider?: Yes (2/13/2025  9:33 AM)  Care Management Interventions: Advised patient to make appointment (2/13/2025  9:33 AM)  Has the patient kept scheduled appointments due by today?: Yes (2/13/2025  9:33 AM)    Self Management  What is the home health agency?: N/A (2/13/2025  9:33 AM)  Has home health visited the patient within 72 hours of discharge?: Not applicable (2/13/2025  9:33 AM)  What Durable Medical Equipment (DME) was ordered?: N/A (2/13/2025  9:33 AM)  Has all Durable Medical Equipment (DME) been delivered?: No (2/13/2025  9:33 AM)    Patient Teaching  Does the patient have access to their discharge instructions?: Yes (2/13/2025  9:33 AM)  Care Management Interventions: Reviewed instructions with patient (2/13/2025  9:33 AM)  What is the patient's perception of their health status since discharge?: Improving (2/13/2025  9:33 AM)  Is the  patient/caregiver able to teach back the hierarchy of who to call/visit for symptoms/problems? PCP, Specialist, Home Health nurse, Urgent Care, ED, 911: Yes (2/13/2025  9:33 AM)

## 2025-02-17 ENCOUNTER — OFFICE VISIT (OUTPATIENT)
Dept: PRIMARY CARE | Facility: CLINIC | Age: 65
End: 2025-02-17
Payer: COMMERCIAL

## 2025-02-17 VITALS
HEIGHT: 64 IN | SYSTOLIC BLOOD PRESSURE: 124 MMHG | BODY MASS INDEX: 30.9 KG/M2 | DIASTOLIC BLOOD PRESSURE: 76 MMHG | WEIGHT: 181 LBS | RESPIRATION RATE: 12 BRPM | HEART RATE: 92 BPM | OXYGEN SATURATION: 98 %

## 2025-02-17 DIAGNOSIS — R79.89 ELEVATED LFTS: ICD-10-CM

## 2025-02-17 DIAGNOSIS — K80.20 GALL STONES: ICD-10-CM

## 2025-02-17 DIAGNOSIS — K29.80 DUODENITIS: Primary | ICD-10-CM

## 2025-02-17 DIAGNOSIS — R74.8 ELEVATED ALKALINE PHOSPHATASE LEVEL: ICD-10-CM

## 2025-02-17 DIAGNOSIS — K43.9 VENTRAL HERNIA WITHOUT OBSTRUCTION OR GANGRENE: ICD-10-CM

## 2025-02-17 PROCEDURE — 99495 TRANSJ CARE MGMT MOD F2F 14D: CPT | Performed by: FAMILY MEDICINE

## 2025-02-17 PROCEDURE — 3078F DIAST BP <80 MM HG: CPT | Performed by: FAMILY MEDICINE

## 2025-02-17 PROCEDURE — 3008F BODY MASS INDEX DOCD: CPT | Performed by: FAMILY MEDICINE

## 2025-02-17 PROCEDURE — 3074F SYST BP LT 130 MM HG: CPT | Performed by: FAMILY MEDICINE

## 2025-02-17 PROCEDURE — 4010F ACE/ARB THERAPY RXD/TAKEN: CPT | Performed by: FAMILY MEDICINE

## 2025-02-17 PROCEDURE — 1036F TOBACCO NON-USER: CPT | Performed by: FAMILY MEDICINE

## 2025-02-17 PROCEDURE — 3044F HG A1C LEVEL LT 7.0%: CPT | Performed by: FAMILY MEDICINE

## 2025-02-17 ASSESSMENT — ENCOUNTER SYMPTOMS
CHEST TIGHTNESS: 0
SHORTNESS OF BREATH: 0
APPETITE CHANGE: 0
HEADACHES: 0
CONSTIPATION: 0
ARTHRALGIAS: 0
DIFFICULTY URINATING: 0
EYE PAIN: 0
CHILLS: 0
ADENOPATHY: 0
BRUISES/BLEEDS EASILY: 0
FEVER: 0
DYSPHORIC MOOD: 0
DIZZINESS: 0
ABDOMINAL PAIN: 0
NERVOUS/ANXIOUS: 0
DIARRHEA: 0
SORE THROAT: 0
EYE REDNESS: 0
BLOOD IN STOOL: 0
WEAKNESS: 0
COUGH: 0
BACK PAIN: 0
DYSURIA: 0
FATIGUE: 0
ABDOMINAL DISTENTION: 0

## 2025-02-17 NOTE — PROGRESS NOTES
Subjective   Discharge Facility:[facility]  Discharge Diagnosis:Abnormal CT of ABD  Admission Date:2/11/25  Discharge Date: 2/12/25    PCP Appointment Date:none available sent to pcp office  Specialist Appointment Date:   Hospital Encounter and Summary Linked: Yes  See discharge assessment below for further details    Wrap Up  Wrap Up Additional Comments: discused discharge patient stated that he is improving.provided contact information encouraged call with questions. (2/13/2025  9:33 AM)  Call End Time: 0937 (2/13/2025  9:33 AM)    Engagement  Call Start Time: 0933 (2/13/2025  9:33 AM)    Medications  Medications reviewed with patient/caregiver?: Yes (no new meds) (2/13/2025  9:33 AM)  Is the patient having any side effects they believe may be caused by any medication additions or changes?: No (2/13/2025  9:33 AM)  Does the patient have all medications ordered at discharge?: Not applicable (2/13/2025  9:33 AM)  Is the patient taking all medications as directed (includes completed medication regime)?: Yes (2/13/2025  9:33 AM)    Appointments  Does the patient have a primary care provider?: Yes (2/13/2025  9:33 AM)  Care Management Interventions: Advised patient to make appointment (2/13/2025  9:33 AM)  Has the patient kept scheduled appointments due by today?: Yes (2/13/2025  9:33 AM)    Self Management  What is the home health agency?: N/A (2/13/2025  9:33 AM)  Has home health visited the patient within 72 hours of discharge?: Not applicable (2/13/2025  9:33 AM)  What Durable Medical Equipment (DME) was ordered?: N/A (2/13/2025  9:33 AM)  Has all Durable Medical Equipment (DME) been delivered?: No (2/13/2025  9:33 AM)    Patient Teaching  Does the patient have access to their discharge instructions?: Yes (2/13/2025  9:33 AM)  Care Management Interventions: Reviewed instructions with patient (2/13/2025  9:33 AM)  What is the patient's perception of their health status since discharge?: Improving (2/13/2025  9:33  "AM)  Is the patient/caregiver able to teach back the hierarchy of who to call/visit for symptoms/problems? PCP, Specialist, Home Health nurse, Urgent Care, ED, 911: Yes (2/13/2025  9:33 AM)      Patient ID: Ko Porter is a 64 y.o. male who presents for Hospital Follow-up.  Pt here for hospital follow 2/12-13 at [facility]. He was in with epigastric pain, nausea, vomiting intermittently for 4 days prior. He had labs showing elevated LFTS and Alk phos. He had CT abd and  US showing  Gallstones ( without cholecystitis), duodenitis and ventral hernia.  LFTs were downtreading before discharge. Ozempic was planned to hold but is back taking.   Pt reports symptoms are improving.         Review of Systems   Constitutional:  Negative for appetite change, chills, fatigue and fever.   HENT:  Negative for congestion, hearing loss and sore throat.    Eyes:  Negative for pain, redness and visual disturbance.   Respiratory:  Negative for cough, chest tightness and shortness of breath.    Cardiovascular:  Negative for chest pain and leg swelling.   Gastrointestinal:  Negative for abdominal distention, abdominal pain, blood in stool, constipation and diarrhea.   Genitourinary:  Negative for difficulty urinating and dysuria.   Musculoskeletal:  Negative for arthralgias and back pain.   Skin:  Negative for rash.   Neurological:  Negative for dizziness, weakness and headaches.   Hematological:  Negative for adenopathy. Does not bruise/bleed easily.   Psychiatric/Behavioral:  Negative for dysphoric mood. The patient is not nervous/anxious.        Objective   /76   Pulse 92   Resp 12   Ht 1.626 m (5' 4\")   Wt 82.1 kg (181 lb)   SpO2 98%   BMI 31.07 kg/m²    Physical Exam  Constitutional:       General: He is not in acute distress.     Appearance: Normal appearance.   Cardiovascular:      Rate and Rhythm: Normal rate and regular rhythm.      Heart sounds: Normal heart sounds. No murmur heard.  Pulmonary:      " Effort: Pulmonary effort is normal.      Breath sounds: Normal breath sounds.   Abdominal:      Palpations: Abdomen is soft.      Tenderness: There is no abdominal tenderness.   Neurological:      Mental Status: He is alert.   Psychiatric:         Mood and Affect: Mood normal.         Judgment: Judgment normal.           Assessment/Plan   Diagnoses and all orders for this visit:  Duodenitis/Gall stones/Elevated LFTs/Elevated alkaline phosphatase level - improving clinically. Will recheck labs in 1 week. Referring to GI for further eval.  Ventral hernia  - he is trying to set up with surgeon for follow up.     Follow up here as planned

## 2025-02-17 NOTE — PROGRESS NOTES
"Subjective   Patient ID: Ko Porter is a 64 y.o. male who presents for Hospital Follow-up.    HPI     Review of Systems    Objective   /76   Pulse 92   Resp 12   Ht 1.626 m (5' 4\")   Wt 82.1 kg (181 lb)   SpO2 98%   BMI 31.07 kg/m²     Physical Exam    Assessment/Plan          "

## 2025-02-17 NOTE — PROGRESS NOTES
Subjective   Patient ID: Ko Porter is a 64 y.o. male who presents for No chief complaint on file..  HPI    Review of Systems    Objective   There were no vitals taken for this visit.   Physical Exam      Assessment/Plan

## 2025-02-18 ENCOUNTER — OFFICE VISIT (OUTPATIENT)
Dept: GASTROENTEROLOGY | Facility: CLINIC | Age: 65
End: 2025-02-18
Payer: COMMERCIAL

## 2025-02-18 VITALS
BODY MASS INDEX: 30.56 KG/M2 | HEIGHT: 64 IN | TEMPERATURE: 98.2 F | DIASTOLIC BLOOD PRESSURE: 67 MMHG | WEIGHT: 179 LBS | HEART RATE: 77 BPM | SYSTOLIC BLOOD PRESSURE: 116 MMHG

## 2025-02-18 DIAGNOSIS — K29.80 DUODENITIS: ICD-10-CM

## 2025-02-18 DIAGNOSIS — R79.89 ELEVATED LFTS: ICD-10-CM

## 2025-02-18 DIAGNOSIS — K80.20 GALL STONES: ICD-10-CM

## 2025-02-18 PROCEDURE — 99203 OFFICE O/P NEW LOW 30 MIN: CPT | Performed by: NURSE PRACTITIONER

## 2025-02-18 PROCEDURE — 3008F BODY MASS INDEX DOCD: CPT | Performed by: NURSE PRACTITIONER

## 2025-02-18 PROCEDURE — 4010F ACE/ARB THERAPY RXD/TAKEN: CPT | Performed by: NURSE PRACTITIONER

## 2025-02-18 PROCEDURE — 3074F SYST BP LT 130 MM HG: CPT | Performed by: NURSE PRACTITIONER

## 2025-02-18 PROCEDURE — 99213 OFFICE O/P EST LOW 20 MIN: CPT | Performed by: NURSE PRACTITIONER

## 2025-02-18 PROCEDURE — 3044F HG A1C LEVEL LT 7.0%: CPT | Performed by: NURSE PRACTITIONER

## 2025-02-18 PROCEDURE — 3078F DIAST BP <80 MM HG: CPT | Performed by: NURSE PRACTITIONER

## 2025-02-18 ASSESSMENT — ENCOUNTER SYMPTOMS
COUGH: 0
SORE THROAT: 0
ADENOPATHY: 0
HALLUCINATIONS: 0
PALPITATIONS: 0
MYALGIAS: 0
HEMATURIA: 0
BACK PAIN: 0
HEADACHES: 0
WHEEZING: 0
LIGHT-HEADEDNESS: 0
JOINT SWELLING: 0
WEAKNESS: 0
DIZZINESS: 0
EYE PAIN: 0
NUMBNESS: 0
FREQUENCY: 0
NERVOUS/ANXIOUS: 0
FLANK PAIN: 0
DYSURIA: 0
SHORTNESS OF BREATH: 0
FATIGUE: 0
AGITATION: 0
DIAPHORESIS: 0
PHOTOPHOBIA: 0
FEVER: 0
ARTHRALGIAS: 0

## 2025-02-18 NOTE — PATIENT INSTRUCTIONS
Thanks for coming to the GI clinic.     Your elevated liver function tests were most likely from a viral intestinal infection.     I agree with a recheck of blood work per your PCP in a week .     You will be due for a Cologuard or screening colonoscopy in 2/2027.     Follow up will be based upon the above.

## 2025-02-18 NOTE — PROGRESS NOTES
"Subjective   Patient ID: Ko Porter is a 64 y.o. male who presents for increased liver function tests per PCP referral.     This is a 64 year old AAM with history of type II DM diagnosed in 1987 (on Ozempic), HTN, HLD, LEO, ED, and prostate cancer s/p robotic assisted prostatectomy (1/2024  [facility]) who is presenting to the GI clinic for an initial visit.     History per pt and extensive review of EMR    Reports on Saturday 2/8/25, in the middle of the night, he began having nausea/vomiting. Reports passing a \"hard long stool\" at that time. ROS positive for chills at that time, but no fever.     On 2/9/25 and 2/10/25 he was feeling better. He was drinking ginger ale and eating chicken noodle soup at that time to ease his symptoms.     On 2/11/25 he had a resurgence of nausea/vomiting  along with headaches, transient epigastric abdominal pain on inspiration, transient orange urine, and weakness for which he went to the [facility] ED where he was ultimately admitted. CT A/P at that time noted \" circumferential wall thickening of the duodenum and proximal jejunum, suspicious for nonspecific duodenitis and enteritis\". Abdominal US noted cholelithiasis and gallbladder sludge. LFTs on admission elevated (, , alk phos 191, total bilirubin 3.2). Repeat LFTs were elevated, but improved from prior (, , alk phos 152, total bilirubin 1.9).     Denies any sick contacts or eating anything undercooked  before symptom onset.     Today he denies unintentional weight loss, dysphagia, odynophagia, abdominal pain, diarrhea, constipation, jaundice, acholic stools, hematemesis, hematochezia, and melena.     Pt unsure if he ever had a blood transfusion. Denies tattoos.     Screening colonoscopy 2012 Dr. Barrientos : one 4 mm hyperplastic polyp removed and sigmoid colon diverticulosis     Cologuard negative 2/2024     Past medical history:   See above     Past surgical history:   See above   Circumcision " (2016 )   Appendectomy 15-20 years ago per pt   Right eye cataract surgery (2020 )   Left eye cataract surgery (12/2024 )     Family history:   No known GI cancers, IBD, or celiac disease     Social history:   Denies alcohol use (drinks non alcoholic wine on occasion)  Denies use of tobacco and illicit drugs (including IV drugs)    Works second shift as a      Review of Systems   Constitutional:  Negative for diaphoresis, fatigue and fever.   HENT:  Negative for congestion, ear pain, hearing loss, sneezing and sore throat.    Eyes:  Negative for photophobia, pain and visual disturbance.   Respiratory:  Negative for cough, shortness of breath and wheezing.    Cardiovascular:  Negative for chest pain, palpitations and leg swelling.   Endocrine: Negative for cold intolerance and heat intolerance.   Genitourinary:  Negative for dysuria, flank pain, frequency and hematuria.   Musculoskeletal:  Negative for arthralgias, back pain, gait problem, joint swelling and myalgias.   Skin:  Negative for rash.   Neurological:  Negative for dizziness, syncope, weakness, light-headedness, numbness and headaches.   Hematological:  Negative for adenopathy.   Psychiatric/Behavioral:  Negative for agitation and hallucinations. The patient is not nervous/anxious.        No Known Allergies    Current Outpatient Medications   Medication Sig Dispense Refill    aspirin 81 mg EC tablet Take 1 tablet (81 mg) by mouth once daily.      atorvastatin (Lipitor) 10 mg tablet Take 1 tablet (10 mg) by mouth once daily. 90 tablet 1    empagliflozin (Jardiance) 10 mg TAKE 1 TABLET BY MOUTH EVERY DAY 90 tablet 3    insulin aspart (NovoLOG Flexpen U-100 Insulin) 100 unit/mL (3 mL) pen Take two times daily with meals as directed Up to 50 Units total daily 45 mL 3    insulin glargine (Lantus Solostar U-100 Insulin) 100 unit/mL (3 mL) pen INJECT 20 UNITS UNDER THE SKIN ONCE DAILY. TAKE AS DIRECTED PER INSULIN INSTRUCTIONS. 20 mL 3     "lisinopril 20 mg tablet Take 1 tablet (20 mg) by mouth once daily. 90 tablet 1    metFORMIN (Glucophage) 1,000 mg tablet TAKE 1 TABLET TWICE A  tablet 3    semaglutide (Ozempic) 2 mg/dose (8 mg/3 mL) pen injector Inject 2 mg under the skin 1 (one) time per week in the early morning.. 9 mL 3    tadalafil (Cialis) 20 mg tablet Take 1 tablet (20 mg) by mouth once daily as needed for erectile dysfunction. 12 tablet 11    tadalafil (Cialis) 5 mg tablet Take 1 tablet (5 mg) by mouth once daily. 30 tablet 11    tamsulosin (Flomax) 0.4 mg 24 hr capsule Take 1 capsule (0.4 mg) by mouth once daily.      testosterone 20.25 mg/1.25 gram (1.62 %) gel in metered-dose pump Place 2 Pump on the skin once daily. 1 pump to each shoulder 75 g 3    BD Belkys 2nd Gen Pen Needle 32 gauge x 5/32\" needle Use with insulin 3x a day 300 each 3    blood-glucose sensor (Dexcom G7 Sensor) device Change every 10 days 9 each 3    famotidine (Pepcid) 40 mg tablet Take 1 tablet (40 mg) by mouth once daily in the evening.  Take before meals. 90 tablet 1    FreeStyle Reg 2 Sensor kit Use as instructed 6 each 3    pen needle, diabetic (BD Belkys 2nd Gen Pen Needle) 32 gauge x 5/32\" needle        No current facility-administered medications for this visit.        Objective     /67   Pulse 77   Temp 36.8 °C (98.2 °F) (Temporal)   Ht 1.626 m (5' 4\")   Wt 81.2 kg (179 lb)   BMI 30.73 kg/m²     Physical Exam  Constitutional:       General: He is not in acute distress.     Appearance: Normal appearance.   HENT:      Head: Normocephalic and atraumatic.   Eyes:      Conjunctiva/sclera: Conjunctivae normal.   Cardiovascular:      Rate and Rhythm: Normal rate and regular rhythm.      Heart sounds: No murmur heard.     No gallop.   Pulmonary:      Effort: Pulmonary effort is normal.      Breath sounds: Normal breath sounds.   Abdominal:      General: Bowel sounds are normal. There is no distension.      Tenderness: There is no abdominal tenderness. " There is no guarding.   Musculoskeletal:         General: No swelling or deformity. Normal range of motion.      Cervical back: Normal range of motion. No rigidity.   Skin:     General: Skin is warm and dry.      Coloration: Skin is not jaundiced.      Findings: No lesion or rash.   Neurological:      General: No focal deficit present.      Mental Status: He is alert and oriented to person, place, and time.   Psychiatric:         Mood and Affect: Mood normal.         Assessment/Plan   Problem List Items Addressed This Visit    None  Visit Diagnoses       Duodenitis        Gall stones        Elevated LFTs             1. Increased LFTs: improved from prior. Overall suspect this is secondary to viral enteritis in the setting of history and CT findings. In the setting of transient orange urine and gallstones on US, it's also possible he could have passed a stone, but symptoms are not overly suggestive of biliary disease.   - agree with recheck of blood work including hepatitis panel and hepatic function panel in 1 week as per PCP    2. Colorectal cancer screening:  -recommend Cologuard or screening colonoscopy in 2/2027    3. Follow up:  - will be based upon the above

## 2025-02-21 RX ORDER — FAMOTIDINE 40 MG/1
40 TABLET, FILM COATED ORAL
Qty: 90 TABLET | Refills: 1 | OUTPATIENT
Start: 2025-02-21 | End: 2025-08-20

## 2025-02-21 RX ORDER — ATORVASTATIN CALCIUM 10 MG/1
10 TABLET, FILM COATED ORAL DAILY
Qty: 90 TABLET | Refills: 1 | OUTPATIENT
Start: 2025-02-21

## 2025-02-24 ENCOUNTER — PATIENT OUTREACH (OUTPATIENT)
Dept: PRIMARY CARE | Facility: CLINIC | Age: 65
End: 2025-02-24
Payer: COMMERCIAL

## 2025-02-24 DIAGNOSIS — K29.80 DUODENITIS: ICD-10-CM

## 2025-02-24 DIAGNOSIS — R79.89 ELEVATED LFTS: ICD-10-CM

## 2025-02-24 NOTE — PROGRESS NOTES
Call regarding appt. with PCP on 2/17/25  after hospitalization.  At time of outreach call the patient feels as if their condition has (improved since last visit.  Reviewed the PCP appointment with the pt and addressed any questions or concerns.

## 2025-03-12 ENCOUNTER — PATIENT OUTREACH (OUTPATIENT)
Dept: PRIMARY CARE | Facility: CLINIC | Age: 65
End: 2025-03-12
Payer: COMMERCIAL

## 2025-03-17 DIAGNOSIS — E66.01 TYPE 2 DIABETES MELLITUS WITH MORBID OBESITY (MULTI): ICD-10-CM

## 2025-03-17 DIAGNOSIS — E11.69 TYPE 2 DIABETES MELLITUS WITH MORBID OBESITY (MULTI): ICD-10-CM

## 2025-03-17 RX ORDER — METFORMIN HYDROCHLORIDE 1000 MG/1
1000 TABLET ORAL 2 TIMES DAILY
Qty: 180 TABLET | Refills: 3 | Status: SHIPPED | OUTPATIENT
Start: 2025-03-17

## 2025-03-26 ENCOUNTER — APPOINTMENT (OUTPATIENT)
Dept: UROLOGY | Facility: CLINIC | Age: 65
End: 2025-03-26
Payer: COMMERCIAL

## 2025-03-26 VITALS — TEMPERATURE: 97.9 F

## 2025-03-26 DIAGNOSIS — C61 MALIGNANT NEOPLASM OF PROSTATE (MULTI): ICD-10-CM

## 2025-03-26 PROCEDURE — 51798 US URINE CAPACITY MEASURE: CPT | Performed by: PHYSICIAN ASSISTANT

## 2025-03-26 PROCEDURE — 1036F TOBACCO NON-USER: CPT | Performed by: PHYSICIAN ASSISTANT

## 2025-03-26 PROCEDURE — 4010F ACE/ARB THERAPY RXD/TAKEN: CPT | Performed by: PHYSICIAN ASSISTANT

## 2025-03-26 PROCEDURE — 99214 OFFICE O/P EST MOD 30 MIN: CPT | Performed by: PHYSICIAN ASSISTANT

## 2025-03-26 PROCEDURE — 3044F HG A1C LEVEL LT 7.0%: CPT | Performed by: PHYSICIAN ASSISTANT

## 2025-03-26 ASSESSMENT — ENCOUNTER SYMPTOMS
MUSCULOSKELETAL NEGATIVE: 1
HEMATOLOGIC/LYMPHATIC NEGATIVE: 1
ENDOCRINE NEGATIVE: 1
GASTROINTESTINAL NEGATIVE: 1
RESPIRATORY NEGATIVE: 1
NEUROLOGICAL NEGATIVE: 1
CARDIOVASCULAR NEGATIVE: 1
PSYCHIATRIC NEGATIVE: 1
EYES NEGATIVE: 1
CONSTITUTIONAL NEGATIVE: 1
ALLERGIC/IMMUNOLOGIC NEGATIVE: 1

## 2025-03-26 ASSESSMENT — PAIN SCALES - GENERAL: PAINLEVEL_OUTOF10: 0-NO PAIN

## 2025-03-26 NOTE — PROGRESS NOTES
Subjective   Patient ID: Ko Porter is a 64 y.o. male who presents for Malignant neoplasm of prostate .  Benign Prostatic Hypertrophy  Irritative symptoms include urgency.     63 yo male with GG4 prostate cancer s/p RALP 1/8/24, ED on dialy cialis and cialis 20 mg PRN, Hypogonadism on Androgel 2 pumps a day for about 3 months seen for follow up.     Patient reports improvement of erection while on testosterone. He reports using daily cialis, with stimulation he is able to obtain a somewhat satisfactory erection. He is able to penetrate, but It is not strong enough.     He denies any side effects from cialis or testosterone. He denies check pain, shortness of breath pain or swelling in lower extremities.     Patient reports urgency incontinence resolved. He denies urinary frequency or urgency.     No visits with results within 1 Month(s) from this visit.   Latest known visit with results is:   Office Visit on 01/29/2025   Component Date Value Ref Range Status    POC Color, Urine 01/29/2025 Yellow  Straw, Yellow, Light-Yellow Final    POC Appearance, Urine 01/29/2025 Clear  Clear Final    POC Glucose, Urine 01/29/2025 100 (1+) (A)  NEGATIVE mg/dl Final    POC Bilirubin, Urine 01/29/2025 NEGATIVE  NEGATIVE Final    POC Ketones, Urine 01/29/2025 NEGATIVE  NEGATIVE mg/dl Final    POC Specific Gravity, Urine 01/29/2025 >=1.030  1.005 - 1.035 Final    POC Blood, Urine 01/29/2025 NEGATIVE  NEGATIVE Final    POC PH, Urine 01/29/2025 5.5  No Reference Range Established PH Final    POC Protein, Urine 01/29/2025 30 (1+) (A)  NEGATIVE mg/dl Final    POC Urobilinogen, Urine 01/29/2025 0.2  0.2, 1.0 EU/DL Final    Poc Nitrite, Urine 01/29/2025 NEGATIVE  NEGATIVE Final    POC Leukocytes, Urine 01/29/2025 NEGATIVE  NEGATIVE Final     PVR 0  Unable to void today    Review of Systems   Constitutional: Negative.    HENT: Negative.     Eyes: Negative.    Respiratory: Negative.     Cardiovascular: Negative.    Gastrointestinal:  Negative.    Endocrine: Negative.    Genitourinary:  Positive for urgency.   Musculoskeletal: Negative.    Skin: Negative.    Allergic/Immunologic: Negative.    Neurological: Negative.    Hematological: Negative.    Psychiatric/Behavioral: Negative.         Objective   Physical Exam  Constitutional:       General: He is not in acute distress.     Appearance: Normal appearance.   HENT:      Head: Normocephalic and atraumatic.      Nose: Nose normal.      Mouth/Throat:      Mouth: Mucous membranes are moist.   Pulmonary:      Effort: Pulmonary effort is normal.   Musculoskeletal:      Cervical back: Normal range of motion.   Neurological:      Mental Status: He is alert.         Assessment/Plan     ED  I advised continuing dialy cialis and take cialis 20 mg at least an hour prior to activity prn    Hypogonadism  Continue testosterone 2 pumps a day. Patient due for labs. Order in system.   He will let me know when he needs refills.     Follow up in 6 months with prior labs or sooner as needed           Mery Beckham PA-C 03/26/25 9:30 AM

## 2025-04-01 LAB
ERYTHROCYTE [DISTWIDTH] IN BLOOD BY AUTOMATED COUNT: 14.3 % (ref 11–15)
HCT VFR BLD AUTO: 51.7 % (ref 38.5–50)
HGB BLD-MCNC: 16 G/DL (ref 13.2–17.1)
MCH RBC QN AUTO: 27.4 PG (ref 27–33)
MCHC RBC AUTO-ENTMCNC: 30.9 G/DL (ref 32–36)
MCV RBC AUTO: 88.7 FL (ref 80–100)
PLATELET # BLD AUTO: 237 THOUSAND/UL (ref 140–400)
PMV BLD REES-ECKER: 9.7 FL (ref 7.5–12.5)
PSA SERPL-MCNC: <0.04 NG/ML
RBC # BLD AUTO: 5.83 MILLION/UL (ref 4.2–5.8)
TESTOST SERPL-MCNC: 372 NG/DL (ref 250–827)
WBC # BLD AUTO: 7.1 THOUSAND/UL (ref 3.8–10.8)

## 2025-04-02 ENCOUNTER — APPOINTMENT (OUTPATIENT)
Dept: PRIMARY CARE | Facility: CLINIC | Age: 65
End: 2025-04-02
Payer: COMMERCIAL

## 2025-04-02 ENCOUNTER — HOSPITAL ENCOUNTER (OUTPATIENT)
Dept: RADIOLOGY | Facility: CLINIC | Age: 65
Discharge: HOME | End: 2025-04-02
Payer: COMMERCIAL

## 2025-04-02 VITALS
DIASTOLIC BLOOD PRESSURE: 84 MMHG | OXYGEN SATURATION: 96 % | RESPIRATION RATE: 12 BRPM | HEART RATE: 94 BPM | WEIGHT: 184 LBS | SYSTOLIC BLOOD PRESSURE: 124 MMHG | HEIGHT: 64 IN | BODY MASS INDEX: 31.41 KG/M2

## 2025-04-02 DIAGNOSIS — G89.29 CHRONIC PAIN OF BOTH KNEES: ICD-10-CM

## 2025-04-02 DIAGNOSIS — M25.562 CHRONIC PAIN OF BOTH KNEES: ICD-10-CM

## 2025-04-02 DIAGNOSIS — M25.561 CHRONIC PAIN OF BOTH KNEES: ICD-10-CM

## 2025-04-02 DIAGNOSIS — E11.69 TYPE 2 DIABETES MELLITUS WITH HYPERLIPIDEMIA (MULTI): ICD-10-CM

## 2025-04-02 DIAGNOSIS — C61 MALIGNANT NEOPLASM OF PROSTATE (MULTI): ICD-10-CM

## 2025-04-02 DIAGNOSIS — D75.1 ERYTHROCYTOSIS: ICD-10-CM

## 2025-04-02 DIAGNOSIS — E29.1 HYPOGONADISM MALE: ICD-10-CM

## 2025-04-02 DIAGNOSIS — I25.10 CORONARY ARTERY DISEASE INVOLVING NATIVE CORONARY ARTERY OF NATIVE HEART WITHOUT ANGINA PECTORIS: ICD-10-CM

## 2025-04-02 DIAGNOSIS — G47.33 OSA (OBSTRUCTIVE SLEEP APNEA): ICD-10-CM

## 2025-04-02 DIAGNOSIS — E78.2 MIXED HYPERLIPIDEMIA: ICD-10-CM

## 2025-04-02 DIAGNOSIS — Z00.00 HEALTHCARE MAINTENANCE: Primary | ICD-10-CM

## 2025-04-02 DIAGNOSIS — I10 PRIMARY HYPERTENSION: ICD-10-CM

## 2025-04-02 DIAGNOSIS — K21.9 GASTROESOPHAGEAL REFLUX DISEASE, UNSPECIFIED WHETHER ESOPHAGITIS PRESENT: ICD-10-CM

## 2025-04-02 DIAGNOSIS — E78.5 TYPE 2 DIABETES MELLITUS WITH HYPERLIPIDEMIA (MULTI): ICD-10-CM

## 2025-04-02 PROCEDURE — 3044F HG A1C LEVEL LT 7.0%: CPT | Performed by: FAMILY MEDICINE

## 2025-04-02 PROCEDURE — 3079F DIAST BP 80-89 MM HG: CPT | Performed by: FAMILY MEDICINE

## 2025-04-02 PROCEDURE — 99396 PREV VISIT EST AGE 40-64: CPT | Performed by: FAMILY MEDICINE

## 2025-04-02 PROCEDURE — 3008F BODY MASS INDEX DOCD: CPT | Performed by: FAMILY MEDICINE

## 2025-04-02 PROCEDURE — 99214 OFFICE O/P EST MOD 30 MIN: CPT | Performed by: FAMILY MEDICINE

## 2025-04-02 PROCEDURE — 1036F TOBACCO NON-USER: CPT | Performed by: FAMILY MEDICINE

## 2025-04-02 PROCEDURE — 4010F ACE/ARB THERAPY RXD/TAKEN: CPT | Performed by: FAMILY MEDICINE

## 2025-04-02 PROCEDURE — 3074F SYST BP LT 130 MM HG: CPT | Performed by: FAMILY MEDICINE

## 2025-04-02 PROCEDURE — 73562 X-RAY EXAM OF KNEE 3: CPT | Mod: 50

## 2025-04-02 RX ORDER — FAMOTIDINE 40 MG/1
40 TABLET, FILM COATED ORAL
Qty: 90 TABLET | Refills: 1 | Status: SHIPPED | OUTPATIENT
Start: 2025-04-02 | End: 2025-09-29

## 2025-04-02 RX ORDER — ATORVASTATIN CALCIUM 10 MG/1
10 TABLET, FILM COATED ORAL DAILY
Qty: 90 TABLET | Refills: 1 | Status: SHIPPED | OUTPATIENT
Start: 2025-04-02

## 2025-04-02 RX ORDER — LISINOPRIL 20 MG/1
20 TABLET ORAL DAILY
Qty: 90 TABLET | Refills: 1 | Status: SHIPPED | OUTPATIENT
Start: 2025-04-02

## 2025-04-02 ASSESSMENT — ENCOUNTER SYMPTOMS
FEVER: 0
COUGH: 0
APPETITE CHANGE: 0
BRUISES/BLEEDS EASILY: 0
SORE THROAT: 0
NERVOUS/ANXIOUS: 0
FATIGUE: 0
CHILLS: 0
CONSTIPATION: 0
DIZZINESS: 0
CHEST TIGHTNESS: 0
EYE PAIN: 0
ABDOMINAL PAIN: 0
SHORTNESS OF BREATH: 0
ARTHRALGIAS: 1
HEADACHES: 0
DIFFICULTY URINATING: 0
EYE REDNESS: 0
DYSURIA: 0
ABDOMINAL DISTENTION: 0
BLOOD IN STOOL: 0
BACK PAIN: 0
WEAKNESS: 0
DIARRHEA: 0
ADENOPATHY: 0
DYSPHORIC MOOD: 0

## 2025-04-02 ASSESSMENT — PATIENT HEALTH QUESTIONNAIRE - PHQ9
5. POOR APPETITE OR OVEREATING: NOT AT ALL
6. FEELING BAD ABOUT YOURSELF - OR THAT YOU ARE A FAILURE OR HAVE LET YOURSELF OR YOUR FAMILY DOWN: NOT AT ALL
2. FEELING DOWN, DEPRESSED OR HOPELESS: NOT AT ALL
SUM OF ALL RESPONSES TO PHQ QUESTIONS 1-9: 0
3. TROUBLE FALLING OR STAYING ASLEEP OR SLEEPING TOO MUCH: NOT AT ALL
9. THOUGHTS THAT YOU WOULD BE BETTER OFF DEAD, OR OF HURTING YOURSELF: NOT AT ALL
10. IF YOU CHECKED OFF ANY PROBLEMS, HOW DIFFICULT HAVE THESE PROBLEMS MADE IT FOR YOU TO DO YOUR WORK, TAKE CARE OF THINGS AT HOME, OR GET ALONG WITH OTHER PEOPLE: NOT DIFFICULT AT ALL
8. MOVING OR SPEAKING SO SLOWLY THAT OTHER PEOPLE COULD HAVE NOTICED. OR THE OPPOSITE, BEING SO FIGETY OR RESTLESS THAT YOU HAVE BEEN MOVING AROUND A LOT MORE THAN USUAL: NOT AT ALL
SUM OF ALL RESPONSES TO PHQ9 QUESTIONS 1 AND 2: 0
1. LITTLE INTEREST OR PLEASURE IN DOING THINGS: NOT AT ALL
7. TROUBLE CONCENTRATING ON THINGS, SUCH AS READING THE NEWSPAPER OR WATCHING TELEVISION: NOT AT ALL
4. FEELING TIRED OR HAVING LITTLE ENERGY: NOT AT ALL

## 2025-04-02 NOTE — PROGRESS NOTES
Subjective   Patient ID: Ko Porter is a 64 y.o. male who presents for Annual Exam.  PMHX, PSHx, Fam hx, and Social hx reviewed.   New concerns none  Vaccines MMR, Prevnar, RSV, Flu and Shingles vaccines recommended  Dentist seen at least yearly yes  Vision concerns none  Hearing concerns none  Diet is usually overall healthy.   Smoker - no  Lung CT/screening - NA  AAA screening - NA  Alcohol use - averages 0drinks per week  Exercising 0 days per week.   Sexually active - yes, Tadalafil helps  Cologuard negative in 2024    Pt is here for f/u Type 2 diabetes. Sees Dr Freeman  Pt is usually following low carb diet, and is exercising 0 days per week.  Taking Lantus, Novolin, Metformin, Ozempic and Jardiance.   Using CGM . Fasting readings are in 140s range.   A1c 6.5 in January  Eye exam is current  Pt does not  have foot pain, paresthesias, or numbness in feet. Foot checks daily - yes   .  Following with podiatry -  no    Denies vision changes, abdominal pain, excessive thirst, frequent urination.     Pt has chronic and stable HTN and CAD. Sees cardiology  Pt is taking Lisinopril. Tolerating well.  Exercising 0 days per week   Low sodium diet is usually being followed.   Is not monitoring home blood pressures.  Denies HA, vision changes or CP.     Pt has Dyslipidemia.   Lipid panel is overdue to recheck.  Currently taking Atorvastatin and is tolerating well without muscle pains or weakness.     He is doing well post prostatectomy for prostate cancer and is monitoring with urology.               Review of Systems   Constitutional:  Negative for appetite change, chills, fatigue and fever.   HENT:  Negative for congestion, hearing loss and sore throat.    Eyes:  Negative for pain, redness and visual disturbance.   Respiratory:  Negative for cough, chest tightness and shortness of breath.    Cardiovascular:  Negative for chest pain and leg swelling.   Gastrointestinal:  Negative for abdominal distention, abdominal  "pain, blood in stool, constipation and diarrhea.   Genitourinary:  Negative for difficulty urinating and dysuria.   Musculoskeletal:  Positive for arthralgias (b/l knee pain, gradually worsening over the past 2-3 years. Getting tougher to go up and especially down stains). Negative for back pain.   Skin:  Negative for rash.   Neurological:  Negative for dizziness, weakness and headaches.   Hematological:  Negative for adenopathy. Does not bruise/bleed easily.   Psychiatric/Behavioral:  Negative for dysphoric mood. The patient is not nervous/anxious.        Objective   /84   Pulse 94   Resp 12   Ht 1.626 m (5' 4\")   Wt 83.5 kg (184 lb)   SpO2 96%   BMI 31.58 kg/m²    Physical Exam  Constitutional:       General: He is not in acute distress.     Appearance: Normal appearance.   Cardiovascular:      Rate and Rhythm: Normal rate and regular rhythm.      Heart sounds: Normal heart sounds. No murmur heard.  Pulmonary:      Effort: Pulmonary effort is normal.      Breath sounds: Normal breath sounds.   Abdominal:      Palpations: Abdomen is soft.      Tenderness: There is no abdominal tenderness.   Neurological:      Mental Status: He is alert.   Psychiatric:         Mood and Affect: Mood normal.         Judgment: Judgment normal.           Assessment/Plan   Diagnoses and all orders for this visit:  Healthcare maintenance - MMR, Prevnar, RSV, Flu and Shingles vaccines recommended. Other vaccines current. Cologuard is current.  Type 2 diabetes mellitus  - much better on recent A1c level, continue current meds per Dr Freeman  Primary hypertension - stable, continue current dose on Lisinopril and monitor  Mixed hyperlipidemia - stable on statin, continue  Coronary artery disease - stable, monitoring with cardiology  LEO (obstructive sleep apnea)/Erythrocytosis - monitor and could consider follow up with sleep medicine  Gastroesophageal reflux disease- stable with Famotidine continue lowest effective " dose.  Hypogonadism male - stable  Malignant neoplasm of prostate - stable,monitoring with urology   Weight - recommend low carb diet, increasing water intake to at least 64oz/day, healthy snacking between meals, and regular cardiovascular exercise 150mins/week. Goal for weight loss is 1-2# per week.     Follow up in 6 months, 30mins

## 2025-04-02 NOTE — PROGRESS NOTES
"Subjective   Patient ID: Ko Porter is a 64 y.o. male who presents for Annual Exam.    HPI     Review of Systems    Objective   /84   Pulse 94   Resp 12   Ht 1.626 m (5' 4\")   Wt 83.5 kg (184 lb)   SpO2 96%   BMI 31.58 kg/m²     Physical Exam    Assessment/Plan          "

## 2025-04-03 LAB
ALBUMIN SERPL-MCNC: 4.1 G/DL (ref 3.6–5.1)
ALBUMIN/CREAT UR: 6 MG/G CREAT
ALBUMIN/GLOB SERPL: 1.3 (CALC) (ref 1–2.5)
ALP SERPL-CCNC: 99 U/L (ref 35–144)
ALT SERPL-CCNC: 22 U/L (ref 9–46)
AST SERPL-CCNC: 18 U/L (ref 10–35)
BASOPHILS # BLD AUTO: 53 CELLS/UL (ref 0–200)
BASOPHILS NFR BLD AUTO: 0.8 %
BILIRUB DIRECT SERPL-MCNC: 0.2 MG/DL
BILIRUB INDIRECT SERPL-MCNC: 0.8 MG/DL (CALC) (ref 0.2–1.2)
BILIRUB SERPL-MCNC: 1 MG/DL (ref 0.2–1.2)
CHOLEST SERPL-MCNC: 116 MG/DL
CHOLEST/HDLC SERPL: 2.8 (CALC)
CREAT UR-MCNC: 78 MG/DL (ref 20–320)
EOSINOPHIL # BLD AUTO: 178 CELLS/UL (ref 15–500)
EOSINOPHIL NFR BLD AUTO: 2.7 %
ERYTHROCYTE [DISTWIDTH] IN BLOOD BY AUTOMATED COUNT: 14.2 % (ref 11–15)
GLOBULIN SER CALC-MCNC: 3.1 G/DL (CALC) (ref 1.9–3.7)
HAV IGM SERPL QL IA: NORMAL
HBV CORE IGM SERPL QL IA: NORMAL
HBV SURFACE AG SERPL QL IA: NORMAL
HCT VFR BLD AUTO: 48.6 % (ref 38.5–50)
HCV AB SERPL QL IA: NORMAL
HDLC SERPL-MCNC: 42 MG/DL
HGB BLD-MCNC: 15.5 G/DL (ref 13.2–17.1)
LDLC SERPL CALC-MCNC: 51 MG/DL (CALC)
LYMPHOCYTES # BLD AUTO: 1907 CELLS/UL (ref 850–3900)
LYMPHOCYTES NFR BLD AUTO: 28.9 %
MCH RBC QN AUTO: 27.8 PG (ref 27–33)
MCHC RBC AUTO-ENTMCNC: 31.9 G/DL (ref 32–36)
MCV RBC AUTO: 87.3 FL (ref 80–100)
MICROALBUMIN UR-MCNC: 0.5 MG/DL
MONOCYTES # BLD AUTO: 594 CELLS/UL (ref 200–950)
MONOCYTES NFR BLD AUTO: 9 %
NEUTROPHILS # BLD AUTO: 3868 CELLS/UL (ref 1500–7800)
NEUTROPHILS NFR BLD AUTO: 58.6 %
NONHDLC SERPL-MCNC: 74 MG/DL (CALC)
PLATELET # BLD AUTO: 215 THOUSAND/UL (ref 140–400)
PMV BLD REES-ECKER: 9.7 FL (ref 7.5–12.5)
PROT SERPL-MCNC: 7.2 G/DL (ref 6.1–8.1)
RBC # BLD AUTO: 5.57 MILLION/UL (ref 4.2–5.8)
TRIGL SERPL-MCNC: 148 MG/DL
WBC # BLD AUTO: 6.6 THOUSAND/UL (ref 3.8–10.8)

## 2025-04-11 ENCOUNTER — TELEPHONE (OUTPATIENT)
Dept: PRIMARY CARE | Facility: CLINIC | Age: 65
End: 2025-04-11
Payer: COMMERCIAL

## 2025-04-11 NOTE — TELEPHONE ENCOUNTER
----- Message from Enmanuel Lui sent at 4/3/2025  6:42 PM EDT -----  XR + for arthritis of knee, keep plan for PT.

## 2025-04-17 ENCOUNTER — EVALUATION (OUTPATIENT)
Dept: PHYSICAL THERAPY | Facility: CLINIC | Age: 65
End: 2025-04-17
Payer: COMMERCIAL

## 2025-04-17 DIAGNOSIS — M25.561 CHRONIC PAIN OF BOTH KNEES: ICD-10-CM

## 2025-04-17 DIAGNOSIS — M25.562 CHRONIC PAIN OF BOTH KNEES: ICD-10-CM

## 2025-04-17 DIAGNOSIS — G89.29 CHRONIC PAIN OF BOTH KNEES: ICD-10-CM

## 2025-04-17 PROCEDURE — 97110 THERAPEUTIC EXERCISES: CPT | Mod: GP | Performed by: INTERNAL MEDICINE

## 2025-04-17 PROCEDURE — 97161 PT EVAL LOW COMPLEX 20 MIN: CPT | Mod: GP | Performed by: INTERNAL MEDICINE

## 2025-04-17 NOTE — PROGRESS NOTES
PHYSICAL THERAPY EVALUATION AND TREATMENT    Patient Name: Ko Porter  MRN: [id number]  Today's Date: 4/17/2025  Time Calculation  Start Time: 1548  Stop Time: 1624  Time Calculation (min): 36 min    Insurance:  Visit number: 1 (updated 04/17/25)  Insurance Type: Payor: AN[employer]M / Plan: HIGHMARK / Product Type: *No Product type* /   VISITS ARE MED NEC NO AUTH NEEDED PAYS % OOP 1500.00 660.68 APPLIED   Copay: $20  Surgery: Extraction, Cataract, With Iol Insertion - Left, 12/30/2024.  Days since surgery: 108   Referred by: Enmanuel Lui MD     PT Evaluation Time Entry  PT Evaluation (Low) Time Entry: 26  PT Therapeutic Procedures Time Entry  Therapeutic Exercise Time Entry: 10                     Assessment:  PT Assessment  PT Assessment Results: Decreased strength, Decreased range of motion, Decreased mobility, Impaired balance, Pain  Rehab Prognosis: Good  Evaluation/Treatment Tolerance: Patient tolerated treatment well  Barriers to Participation: Capable of completing ADLs semi/independent, Housing layout, Other (Comment) (Work-related duties)     Ko Porter  is a 64 y.o. old patient who participated in a physical therapy evaluation today due to B knee pain. Ko presents with signs and symptoms consistent with B knee OA. Ko's impairments include: balance deficits, gait deficits, pain, decreased strength, decreased range of motion, and decreased functional mobility.   Due to these impairments, he has the following functional limitations and participation restrictions: difficulty with ambulation, difficulty with stair negotiation, difficulty performing recreational activities, difficulty with completing/performing some ADLs/IADLs, and increased time to complete ADLs/IADLs. Ko's clinical presentation is Stable and/or uncomplicated characteristics with the level of complexity being low. Ko's potential for rehab is good.  Skilled physical therapy services are appropriate and beneficial in  order to achieve measurable and meaningful change in the objective tests and measures. Utilization of skilled physical therapy services will aid in advancing his functional status and attaining his therapy-related goals. Ko verbalized understanding and is in agreement with all goals and plan of care.  Ko left session with all questions answered and no increase in symptoms.      Plan:  OP PT Plan  Treatment/Interventions: Aquatic therapy, Blood flow restriction therapy, Cryotherapy, Dry needling, Education/ Instruction, Electrical stimulation, Gait training, Manual therapy, Neuromuscular re-education, Self care/ home management, Taping techniques, Therapeutic activities, Therapeutic exercises, Ultrasound  PT Plan: Skilled PT  PT Frequency:  (1x every week or every other week)  Duration: 4 visits  Rehab Potential: Good  Plan of Care Agreement: Patient    NV: assess response to HEP and add knee flexion ROM exercises to manage stiffness. Consider bracing or taping PRN. Inquire about pain during janitorial duties.    Therapy Diagnosis:   Problem List Items Addressed This Visit    None  Visit Diagnoses         Codes      Chronic pain of both knees     M25.561, M25.562, G89.29    Relevant Orders    Follow Up In Physical Therapy            Subjective    Onset: 04/17/21    OMARI: None. Pt reports progressive since this time. Pt states he had other medical issues going on at the time and it was not financially feasible to address knee pain at the time. Has noticed decreased walking speed and stride length compared to other people. Denies falls in the past 6 months. Denies knee buckling or giving way sx's.    Pain intensity: 0/10  Pain location:  B anterior knees  Pain description:  stiffness  N/a; 0/10 at best    Worse with:  stairs, prolonged sitting  Better with:  activity, gel cream    Denies N/T   Denies radiating pain     Prior treatments/interventions:  gel cream    Home: 8th floor of apartment without elevator  "access. 8 flights of 15 steps each.   PLOF/CLOF: Independent with all mobility, ADLs, and iADLs without AD  Functional limitations: stairs, driving (prolonged sitting)  Pt's goal: \" I walk slow\"     Work: full-time   Exercise: has [employer] membership; has only gone twice since then  Hobbies: basketball    Diagnostic images:   XR knee 3 views bilateral 04/03/25:  FINDINGS:  No acute fracture or dislocation. No lytic or blastic lesions or  periosteal reaction. Mild tricompartmental joint space loss with tiny  tricompartmental osteophytes. Likely a suprapatellar joint effusion.  Hypertrophic changes and fragmentation of the anterior tibial  tubercle.      IMPRESSION:  No acute fracture or dislocation. Mild degenerative changes of the  left knee with question of suprapatellar joint effusion.    Medical History Form: Reviewed (scanned into chart)      Precautions:  Fall Risk: None    + prostate CA (02/2024); medically managed; still undergoing treatment. +DM; medically managed (pt on Ozempic). +HTN; medically managed. +blood thinner med; low-dose Aspirin.  Denies: CA, pacemaker, latex allergy, epilepsy/seizures, or other known cardio/neuro/pulmonary problems   Denies unexpected weight loss/gain, night sweats, or night pain.  Previous surgeries include:  none    Objective   Outcome Measures:  Other Measures  Lower Extremity Funtional Score (LEFS): 50/80    PGS: .78 m/s  FGS: 1.66 m/s    AROM (degrees)  R Knee: 0 - 115  L Knee: 0 - 110    Knee Strength:  Knee Extension R/L:  5/5, 5/5  Knee Flexion R/L: 5/5, 5/5    Hip Strength:   Hip Extension R/L:  2-/5,  2-/5       Hip Abduction R/L:  4+/5,  4+/5     Hip Flexion R/L: 4/5,  4/5    Gait: Pt ambulates independently without assistive device. Demos slowed alycia and decreased step length B    SLS (R/L): 24.25\" / 3.37\"    Stairs: Pt ascends/descends 4x6\" steps with BHrs and reciprocal gait pattern observed both ways.       Tenderness: no tenderness      KEY  NT = " not tested this visit  p! = pain  ~ = approximation        Treatments:  Therapeutic Exercise  Access Code: [email or web address]   - Supine Bridge  - 1 x daily - 7 x weekly - 3 sets - 10 reps  - Full Leg Press  - 1 x daily - 2-3 x weekly - 2-3 sets - 10 reps  - Single Leg Press  - 1 x daily - 2-3 x weekly - 2-3 sets - 10 reps      EDUCATION:  Outpatient Education  Individual(s) Educated: Patient  Education Provided: Home Exercise Program, POC  Patient/Caregiver Demonstrated Understanding: yes  Plan of Care Discussed and Agreed Upon: yes  Patient Response to Education: Patient/Caregiver Verbalized Understanding of Information, Patient/Caregiver Performed Return Demonstration of Exercises/Activities, Patient/Caregiver Asked Appropriate Questions    -Educated Ko regarding benefit and purpose of skilled PT services along with results of examination findings and how this correlates to their chief complaint.   -Answered Ko's questions in full.    Goals:  ST weeks  1. Patient will be independent with HEP to progress functional status    LTGs: 6 weeks  1. Patient will improve B hip extensor strength to >/=4+/5 for improved knee stability.  2. Patient will improve B knee AROM to >/=0-120 deg for improved knee mobility.  3. Patient will improve LEFS score to >/= 59/80  4. Patient will improve normal and fast walking speed >/= 0.13 m/s to indicate MCID for older adult population

## 2025-05-08 ENCOUNTER — TREATMENT (OUTPATIENT)
Dept: PHYSICAL THERAPY | Facility: CLINIC | Age: 65
End: 2025-05-08
Payer: COMMERCIAL

## 2025-05-08 DIAGNOSIS — G89.29 CHRONIC PAIN OF BOTH KNEES: ICD-10-CM

## 2025-05-08 DIAGNOSIS — M25.561 CHRONIC PAIN OF BOTH KNEES: ICD-10-CM

## 2025-05-08 DIAGNOSIS — M25.562 CHRONIC PAIN OF BOTH KNEES: ICD-10-CM

## 2025-05-08 PROCEDURE — 97110 THERAPEUTIC EXERCISES: CPT | Mod: GP | Performed by: INTERNAL MEDICINE

## 2025-05-08 NOTE — PROGRESS NOTES
"Physical Therapy    Physical Therapy Treatment    Patient Name: Ko Porter  MRN: [id number]  Today's Date: 5/8/2025  Time Calculation  Start Time: 1548  Stop Time: 1626  Time Calculation (min): 38 min    Insurance - reviewed   Visit number: 2 (updated 05/08/25)   Payor: LINNEA / Plan: HIGHMARK / Product Type: *No Product type* /    $20 COPAY   VISITS ARE MED NEC NO AUTH   Surgery: Extraction, Cataract, With Iol Insertion - Left, 12/30/2024.  Days since surgery: 129   Referring Provider: Enmanuel Lui MD     Current Problem  Problem List Items Addressed This Visit    None  Visit Diagnoses         Codes      Chronic pain of both knees     M25.561, M25.562, G89.29            Precautions:  Fall Risk: None    + prostate CA (02/2024); medically managed; still undergoing treatment. +DM; medically managed (pt on Ozempic). +HTN; medically managed. +blood thinner med; low-dose Aspirin.  Denies: CA, pacemaker, latex allergy, epilepsy/seizures, or other known cardio/neuro/pulmonary problems   Denies unexpected weight loss/gain, night sweats, or night pain.  Previous surgeries include:  none    General  Subjective      Ko Porter denies any falls or complications since last session. Ko Porter reports trying a cream for his knee pain but it made his quad muscle more painful. Pt reports he did 50 bridges without increase back pain. Pt still has to complete steps in apartment complex up to 8th floor due to elevator being out of order.    Ko Porter reports fair compliance with HEP. Did not go to the gym to     Pain:  0/10  Pain location: n/a         Treatments:  Abbreviation Key  NC = not completed this visit   NV = next visit  // = parallel    Assigned HEP- Medbridge [id number]     Therapeutic Exercise:  38 minutes    Nu-step lvl 4 x5 mins; LE warm-up and subjective   Full Leg Press (seat 5) 90# 20x   Single Leg Press (seat 5) 60# 20x  B gastroc slant board 2x30\" holds  R gastroc stretch on wall - HEP " "  Seated HS stretch 2x30\" each side  Standing quad stretch 2x30\" each side; towel assist for R knee bending   Tandem standing overground 30\" each side  Tandem standing on foam 2x30\" holds each side  Clock reaches 10x each side  SLS on foam: FWD/BWD tapping 20x each side  SLS on foam: MED/LAT tapping 20x each side     Verbal review only:  Supine Bridge  - 1 x daily - 7 x weekly - 3 sets - 10 reps    Outpatient Education: Reviewed home exercise program. Home exercise program instructed and issued. Answered questions about home exercise program. Provided manual cues for correct performance of exercises. Provided verbal feedback to improve exercise technique. Cues/rationale for there-ex.     Ko Porter verbalizes understanding of all education provided: Yes    Assessment:  Ko Porter completed treatment today which focused upon reviewing, performing, and progressing HEP with emphasis on improvement flexibility and stretch musculature around knee as well as balance in order to address B knee pain. Ko presents with no pain and decreased quad flexibility, R > L today.  Ko requires cues for technique and body positioning for stretching and balance positioning.  Ko was challenged with standing balance on uneven surfaces with UE support required intermittently.    Ko's  response to tx was: Patient tolerated therapeutic interventions well and without any adverse events. Tolerated treatment session well without any increase in pain. Improved independence with home exercises. Improved tolerance to strengthening progressions. Patient required increased cuing and demonstrations for exercises which increases time required for interventions..   Ko's Progress towards goals: Reduced pain level..   Ko Porter continues to have gait deficits and pain limiting participation in occupational tasks, exercise, and shopping within the community. Further skilled therapy services are warranted to address the " remaining impairments in order to progress towards functional goals. Ko left session with all questions answered and no increase in symptoms.      Plan:  Assess response to HEP update; progress standing balance       Time Calculation  Start Time: 1548  Stop Time: 1626  Time Calculation (min): 38 min     PT Therapeutic Procedures Time Entry  Therapeutic Exercise Time Entry: 38

## 2025-05-12 ENCOUNTER — PATIENT OUTREACH (OUTPATIENT)
Dept: PRIMARY CARE | Facility: CLINIC | Age: 65
End: 2025-05-12
Payer: COMMERCIAL

## 2025-05-15 DIAGNOSIS — E66.01 TYPE 2 DIABETES MELLITUS WITH MORBID OBESITY: ICD-10-CM

## 2025-05-15 DIAGNOSIS — E11.69 TYPE 2 DIABETES MELLITUS WITH MORBID OBESITY: ICD-10-CM

## 2025-05-15 RX ORDER — SEMAGLUTIDE 2.68 MG/ML
2 INJECTION, SOLUTION SUBCUTANEOUS
Qty: 9 ML | Refills: 3 | Status: SHIPPED | OUTPATIENT
Start: 2025-05-15 | End: 2026-05-15

## 2025-05-16 NOTE — PROGRESS NOTES
UROLOGIC FOLLOW-UP VISIT     PROBLEM LIST:    No diagnosis found.    HISTORY OF PRESENT ILLNESS:   64 y/o male with hx of GG4 prostate cancer on biopsy 10/13/2023. PSMA 12/12/2023 was negative for distant or issac mets. Patient underwent RALP on 1/8/24. He's been doing well since surgery. He denies any issues with stress urinary incontinence, but does endorse urge incontinence. Denies pushing or straining to void and states he feels he empties his bladder when voiding.  Post-void residual today is 0 cc.     During his last visit he was able to achieve an erection good enough for penetration but is unable to sustain an erection with a constriction device. He has not used daily 5mg cialis. He had issues with controlling his blood sugars and states his urinary sx worsen when his blood sugars are elevated. He was previously on testosterone therapy which he states he noticed improvement after starting.     Interim history:  11/18/24: He reports being overall well today. He notes improvement in his erections and continues to be on Cialis.    Patient denies fevers, chills, nausea, and vomiting.     5/19/25: ***    PSA Trend:  10/8/24: <0.10  4/29/24: <0.10  2/20/2024: <0.10  6/1/2023: 6.1  5/10/2022: 3.3  5/18/2021: 1.95  11/9/2020:5.17    PAST MEDICAL HISTORY:    Past Medical History:   Diagnosis Date    Achilles tendinitis 06/16/2023    Anxiety 06/16/2023    BPH (benign prostatic hyperplasia)     Cataract     Coronary artery disease     Diabetes mellitus (Multi) 06/16/2023    A1C= 6.9% on 11/6/23    ED (erectile dysfunction)     GERD (gastroesophageal reflux disease)     Glaucoma     H/O difficult intubation     Hyperlipidemia     Hypertension     Hypogonadism, male     LEO (obstructive sleep apnea)     Not currently on CPAP    Prostate CA (Multi)     Type 2 diabetes mellitus        PAST SURGICAL HISTORY:  Past Surgical History:   Procedure Laterality Date    APPENDECTOMY  01/22/2016    Appendectomy    CATARACT  "EXTRACTION W/  INTRAOCULAR LENS IMPLANT Left 12/30/2024    CATARACT EXTRACTION W/  INTRAOCULAR LENS IMPLANT Right 07/13/2020    COLONOSCOPY  10/02/2013    Complete Colonoscopy    FINGER SURGERY Right     index    HEMORRHOID SURGERY      HERNIA REPAIR      PROSTATE BIOPSY      PROSTATECTOMY          ALLERGIES:   No Known Allergies     MEDICATIONS:     Current Outpatient Medications:     aspirin 81 mg EC tablet, Take 1 tablet (81 mg) by mouth once daily., Disp: , Rfl:     atorvastatin (Lipitor) 10 mg tablet, Take 1 tablet (10 mg) by mouth once daily., Disp: 90 tablet, Rfl: 1    BD Belkys 2nd Gen Pen Needle 32 gauge x 5/32\" needle, Use with insulin 3x a day, Disp: 300 each, Rfl: 3    blood-glucose sensor (Dexcom G7 Sensor) device, Change every 10 days, Disp: 9 each, Rfl: 3    empagliflozin (Jardiance) 10 mg, TAKE 1 TABLET BY MOUTH EVERY DAY, Disp: 90 tablet, Rfl: 3    famotidine (Pepcid) 40 mg tablet, Take 1 tablet (40 mg) by mouth once daily in the evening.  Take before meals., Disp: 90 tablet, Rfl: 1    FreeStyle Reg 2 Sensor kit, Use as instructed, Disp: 6 each, Rfl: 3    insulin aspart (NovoLOG Flexpen U-100 Insulin) 100 unit/mL (3 mL) pen, Take two times daily with meals as directed Up to 50 Units total daily, Disp: 45 mL, Rfl: 3    insulin glargine (Lantus Solostar U-100 Insulin) 100 unit/mL (3 mL) pen, INJECT 20 UNITS UNDER THE SKIN ONCE DAILY. TAKE AS DIRECTED PER INSULIN INSTRUCTIONS., Disp: 20 mL, Rfl: 3    lisinopril 20 mg tablet, Take 1 tablet (20 mg) by mouth once daily., Disp: 90 tablet, Rfl: 1    metFORMIN (Glucophage) 1,000 mg tablet, Take 1 tablet (1,000 mg) by mouth 2 times a day., Disp: 180 tablet, Rfl: 3    pen needle, diabetic (BD Belkys 2nd Gen Pen Needle) 32 gauge x 5/32\" needle, , Disp: , Rfl:     semaglutide (Ozempic) 2 mg/dose (8 mg/3 mL) pen injector, Inject 2 mg under the skin 1 (one) time per week in the early morning.., Disp: 9 mL, Rfl: 3    tadalafil (Cialis) 20 mg tablet, Take 1 tablet (20 " mg) by mouth once daily as needed for erectile dysfunction., Disp: 12 tablet, Rfl: 11    tadalafil (Cialis) 5 mg tablet, Take 1 tablet (5 mg) by mouth once daily., Disp: 30 tablet, Rfl: 11    tamsulosin (Flomax) 0.4 mg 24 hr capsule, Take 1 capsule (0.4 mg) by mouth once daily., Disp: , Rfl:     testosterone 20.25 mg/1.25 gram (1.62 %) gel in metered-dose pump, Place 2 Pump on the skin once daily. 1 pump to each shoulder, Disp: 75 g, Rfl: 3      SOCIAL HISTORY:  Patient  reports that he has never smoked. He has never used smokeless tobacco. He reports that he does not currently use alcohol. He reports that he does not use drugs.   Social History     Socioeconomic History    Marital status:      Spouse name: Not on file    Number of children: Not on file    Years of education: Not on file    Highest education level: Not on file   Occupational History    Not on file   Tobacco Use    Smoking status: Never    Smokeless tobacco: Never    Tobacco comments:     No history of anesthesia reactions. No family history of MH.     No illnesses in the past 30 days.     Does not get SOB with a flight of stairs.     Does not use a cane, walker, or wheelchair.     Is able to lie flat for 30 minutes.     Substance and Sexual Activity    Alcohol use: Not Currently    Drug use: Never    Sexual activity: Not on file   Other Topics Concern    Not on file   Social History Narrative    Not on file     Social Drivers of Health     Financial Resource Strain: Not on file   Food Insecurity: Not on file   Transportation Needs: Not on file   Physical Activity: Not on file   Stress: Not on file   Social Connections: Not on file   Intimate Partner Violence: Not on file   Housing Stability: Not on file       FAMILY HISTORY:  Family History   Problem Relation Name Age of Onset    Diabetes Father      Cancer Maternal Grandfather         REVIEW OF SYSTEMS:  Constitutional: Negative for fever and chills. Denies anorexia, weight loss.  Eyes:  Negative for visual disturbance.   Respiratory: Negative for shortness of breath.    Cardiovascular: Negative for chest pain.   Gastrointestinal: Negative for nausea and vomiting.   Genitourinary: See interval history above.  Skin: Negative for rash.   Neurological: Negative for dizziness and numbness.   Psychiatric/Behavioral: Negative for confusion and decreased concentration.     PHYSICAL EXAM:  There were no vitals taken for this visit.  Constitutional: Patient appears well-developed and well-nourished. No distress.    Head: Normocephalic and atraumatic.    Neck: Normal range of motion.    Cardiovascular: Normal rate.    Pulmonary/Chest: Effort normal. No respiratory distress.   Abdominal: soft, NT, ND  Musculoskeletal: Normal range of motion.    Neurological: Alert and oriented to person, place, and time.  Psychiatric: Normal mood and affect. Behavior is normal. Thought content normal.      LABORATORY REVIEW:     Lab Results   Component Value Date    BUN 16 01/21/2025    CREATININE 1.00 01/21/2025    EGFR 85 01/21/2025     01/21/2025    K 4.5 01/21/2025     01/21/2025    CO2 29 01/21/2025    CALCIUM 9.8 01/21/2025      Lab Results   Component Value Date    WBC 6.6 04/02/2025    RBC 5.57 04/02/2025    HGB 15.5 04/02/2025    HCT 48.6 04/02/2025    MCV 87.3 04/02/2025    MCH 27.8 04/02/2025    MCHC 31.9 (L) 04/02/2025    RDW 14.2 04/02/2025     04/02/2025    MPV 9.7 04/02/2025        Lab Results   Component Value Date    PSA <0.04 03/31/2025    PSA <0.10 01/21/2025    PSA <0.10 10/08/2024    PSA <0.10 08/16/2024    PSA <0.10 04/29/2024    PSA <0.10 02/20/2024    PSA 4.17 (H) 06/01/2023    PSA 3.3 05/10/2022    PSA 1.95 05/18/2021    PSA 1.8 01/06/2021    PSA 5.17 (H) 11/09/2020    PSA 2.09 05/23/2020    PSA 1.74 11/02/2019    PSA 1.97 03/09/2019         Assessment:      No diagnosis found.        Plan:   Reviewed and interpreted patient's PSA trend, which remains undetectable.  His PVR today is  0 cc, and is emptying well.   Counseled patient on functional recovery after surgery and that he's improving   Continues daily 5mg cialis and 20mg Cialis PRN(30-40 min prior to sexual activity).   Will transition to q6m surveillance   RTC in 6 months    *** minutes total spent on patient's care today; >50% time spent on counseling/coordination of care    Scribe Attestation  By signing my name below, Gissel PEOPLES Scribe   attest that this documentation has been prepared under the direction and in the presence of Russell Soni MD.

## 2025-05-19 ENCOUNTER — APPOINTMENT (OUTPATIENT)
Dept: UROLOGY | Facility: HOSPITAL | Age: 65
End: 2025-05-19
Payer: COMMERCIAL

## 2025-05-20 ENCOUNTER — TREATMENT (OUTPATIENT)
Dept: PHYSICAL THERAPY | Facility: CLINIC | Age: 65
End: 2025-05-20
Payer: COMMERCIAL

## 2025-05-20 DIAGNOSIS — M25.561 CHRONIC PAIN OF BOTH KNEES: ICD-10-CM

## 2025-05-20 DIAGNOSIS — E11.69 TYPE 2 DIABETES MELLITUS WITH MORBID OBESITY: ICD-10-CM

## 2025-05-20 DIAGNOSIS — E66.01 TYPE 2 DIABETES MELLITUS WITH MORBID OBESITY: ICD-10-CM

## 2025-05-20 DIAGNOSIS — M25.562 CHRONIC PAIN OF BOTH KNEES: ICD-10-CM

## 2025-05-20 DIAGNOSIS — G89.29 CHRONIC PAIN OF BOTH KNEES: ICD-10-CM

## 2025-05-20 PROCEDURE — 97112 NEUROMUSCULAR REEDUCATION: CPT | Mod: GP | Performed by: INTERNAL MEDICINE

## 2025-05-20 PROCEDURE — 97110 THERAPEUTIC EXERCISES: CPT | Mod: GP | Performed by: INTERNAL MEDICINE

## 2025-05-20 RX ORDER — PEN NEEDLE, DIABETIC 32GX 5/32"
NEEDLE, DISPOSABLE MISCELLANEOUS
Qty: 300 EACH | Refills: 3 | Status: SHIPPED | OUTPATIENT
Start: 2025-05-20

## 2025-05-20 NOTE — PROGRESS NOTES
"Physical Therapy    Physical Therapy Treatment    Patient Name: Ko Porter  MRN: [id number]  Today's Date: 5/20/2025  Time Calculation  Start Time: 1551  Stop Time: 1629  Time Calculation (min): 38 min    Insurance - reviewed   Visit number: 3 (updated 05/20/25)   Payor: LINNEA / Plan: HIGHMARK / Product Type: *No Product type* /    $20 COPAY   VISITS ARE MED NEC NO AUTH   Surgery: Extraction, Cataract, With Iol Insertion - Left, 12/30/2024.  Days since surgery: 141   Referring Provider: Enmaunel Lui MD     Current Problem  Problem List Items Addressed This Visit    None  Visit Diagnoses         Codes      Chronic pain of both knees     M25.561, M25.562, G89.29              Precautions:  Fall Risk: None    + prostate CA (02/2024); medically managed; still undergoing treatment. +DM; medically managed (pt on Ozempic). +HTN; medically managed. +blood thinner med; low-dose Aspirin.  Denies: CA, pacemaker, latex allergy, epilepsy/seizures, or other known cardio/neuro/pulmonary problems   Denies unexpected weight loss/gain, night sweats, or night pain.  Previous surgeries include:  none    General  Subjective      Ko Porter denies any falls or complications since last session. Ko Porter reports knees were stiff going up and down the stairs today but otherwise no issues. Pt still has to complete steps in apartment complex up to 8th floor due to elevator being out of order; reports should be back in order at beginning of June. Pt taking two Tylenol every morning    Ko Porter reports fair compliance with HEP. Has tried some stretching     Pain:  0/10  Pain location: n/a         Treatments:  Abbreviation Key  NC = not completed this visit   NV = next visit  // = parallel    Assigned HEP- Medbridge [id number]     Neuromuscular Re-ed:  Tandem standing on foam 2x30\" holds each side  SLS on foam: FWD/BWD tapping 20x each side  SLS on foam: MED/LAT tapping 20x each side     Therapeutic Exercise:  28 " "minutes    Nu-step lvl 4 x10 mins; LE warm-up and subjective   Resisted side stepping with green TB around ankles x4 laps  Resisted FWD/BWD monster walking with green TB around ankles x4 laps  B gastroc slant board 3x30\" holds  Seated HS stretch 2x30\" each side  Standing quad stretch 3x20\" each side    The following were NC this session:   Full Leg Press (seat 5) 90# 20x   Single Leg Press (seat 5) 60# 20x  Verbal review only:  Supine Bridge  - 1 x daily - 7 x weekly - 3 sets - 10 reps    Outpatient Education: Reviewed home exercise program. Home exercise program instructed and issued. Answered questions about home exercise program. Provided manual cues for correct performance of exercises. Provided verbal feedback to improve exercise technique. Cues/rationale for there-ex.     Ko Porter verbalizes understanding of all education provided: Yes    Assessment:  Ko Porter completed treatment today which focused upon cont balance over uneven surfaces for knee stabilization and cont LE flexibility exercises in order to address B knee pain. Ko presents with no pain and cont to demo decreased quad flexibility, R > L today.  Ko requires cues for technique and body positioning for stretching and balance positioning.  Ko was challenged with standing balance on uneven surfaces with UE support required intermittently.    Ko's  response to tx was: Patient tolerated therapeutic interventions well and without any adverse events. Tolerated treatment session well without any increase in pain. Improved independence with home exercises. Improved tolerance to strengthening progressions. Patient required increased cuing and demonstrations for exercises which increases time required for interventions..   Ko's Progress towards goals: Reduced pain level..   Ko Porter continues to have gait deficits and pain limiting participation in occupational tasks, exercise, and shopping within the community. Further " skilled therapy services are warranted to address the remaining impairments in order to progress towards functional goals. Ko left session with all questions answered and no increase in symptoms.      Plan:  progress standing balance       Time Calculation  Start Time: 1551  Stop Time: 1629  Time Calculation (min): 38 min     PT Therapeutic Procedures Time Entry  Neuromuscular Re-Education Time Entry: 10  Therapeutic Exercise Time Entry: 28

## 2025-05-27 NOTE — PROGRESS NOTES
UROLOGIC FOLLOW-UP VISIT     PROBLEM LIST:    1. 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 PSMA 12/12/2023 was negative for distant or issac mets. Patient underwent RALP on 1/8/24. He's been doing well since surgery. He denies any issues with stress urinary incontinence, but does endorse urge incontinence. Denies pushing or straining to void and states he feels he empties his bladder when voiding.  Post-void residual today is 0 cc.     During his last visit he was able to achieve an erection good enough for penetration but is unable to sustain an erection with a constriction device. He has not used daily 5mg cialis. He had issues with controlling his blood sugars and states his urinary sx worsen when his blood sugars are elevated. He was previously on testosterone therapy which he states he noticed improvement after starting.     Interim history:  11/18/24: He reports being overall well today. He notes improvement in his erections and continues to be on Cialis.    Patient denies fevers, chills, nausea, and vomiting.     5/28/25: He reports being overall well today. He's able to have intercourse using just his daily cialis without needing 20mg cialis.  Patient denies fevers, chills, nausea, and vomiting.     PSA Trend:  10/8/24: <0.10  4/29/24: <0.10  2/20/2024: <0.10  6/1/2023: 6.1  5/10/2022: 3.3  5/18/2021: 1.95  11/9/2020:5.17    PAST MEDICAL HISTORY:    Past Medical History:   Diagnosis Date    Achilles tendinitis 06/16/2023    Anxiety 06/16/2023    BPH (benign prostatic hyperplasia)     Cataract     Coronary artery disease     Diabetes mellitus (Multi) 06/16/2023    A1C= 6.9% on 11/6/23    ED (erectile dysfunction)     GERD (gastroesophageal reflux disease)     Glaucoma     H/O difficult intubation     Hyperlipidemia     Hypertension      "Hypogonadism, male     LEO (obstructive sleep apnea)     Not currently on CPAP    Prostate CA (Multi)     Type 2 diabetes mellitus        PAST SURGICAL HISTORY:  Past Surgical History:   Procedure Laterality Date    APPENDECTOMY  01/22/2016    Appendectomy    CATARACT EXTRACTION W/  INTRAOCULAR LENS IMPLANT Left 12/30/2024    CATARACT EXTRACTION W/  INTRAOCULAR LENS IMPLANT Right 07/13/2020    COLONOSCOPY  10/02/2013    Complete Colonoscopy    FINGER SURGERY Right     index    HEMORRHOID SURGERY      HERNIA REPAIR      PROSTATE BIOPSY      PROSTATECTOMY          ALLERGIES:   No Known Allergies     MEDICATIONS:     Current Outpatient Medications:     aspirin 81 mg EC tablet, Take 1 tablet (81 mg) by mouth once daily., Disp: , Rfl:     atorvastatin (Lipitor) 10 mg tablet, Take 1 tablet (10 mg) by mouth once daily., Disp: 90 tablet, Rfl: 1    blood-glucose sensor (Dexcom G7 Sensor) device, Change every 10 days, Disp: 9 each, Rfl: 3    empagliflozin (Jardiance) 10 mg, TAKE 1 TABLET BY MOUTH EVERY DAY, Disp: 90 tablet, Rfl: 3    famotidine (Pepcid) 40 mg tablet, Take 1 tablet (40 mg) by mouth once daily in the evening.  Take before meals., Disp: 90 tablet, Rfl: 1    FreeStyle Reg 2 Sensor kit, Use as instructed, Disp: 6 each, Rfl: 3    insulin aspart (NovoLOG Flexpen U-100 Insulin) 100 unit/mL (3 mL) pen, Take two times daily with meals as directed Up to 50 Units total daily, Disp: 45 mL, Rfl: 3    insulin glargine (Lantus Solostar U-100 Insulin) 100 unit/mL (3 mL) pen, INJECT 20 UNITS UNDER THE SKIN ONCE DAILY. TAKE AS DIRECTED PER INSULIN INSTRUCTIONS., Disp: 20 mL, Rfl: 3    lisinopril 20 mg tablet, Take 1 tablet (20 mg) by mouth once daily., Disp: 90 tablet, Rfl: 1    metFORMIN (Glucophage) 1,000 mg tablet, Take 1 tablet (1,000 mg) by mouth 2 times a day., Disp: 180 tablet, Rfl: 3    pen needle, diabetic (BD Belkys 2nd Gen Pen Needle) 32 gauge x 5/32\" needle, , Disp: , Rfl:     pen needle, diabetic (BD Belkys 2nd Gen " "Pen Needle) 32 gauge x 5/32\" needle, USE WITH INSULIN 3X A DAY, Disp: 300 each, Rfl: 3    semaglutide (Ozempic) 2 mg/dose (8 mg/3 mL) pen injector, Inject 2 mg under the skin 1 (one) time per week in the early morning.., Disp: 9 mL, Rfl: 3    tadalafil (Cialis) 20 mg tablet, Take 1 tablet (20 mg) by mouth once daily as needed for erectile dysfunction., Disp: 12 tablet, Rfl: 11    tadalafil (Cialis) 5 mg tablet, Take 1 tablet (5 mg) by mouth once daily., Disp: 30 tablet, Rfl: 11    tamsulosin (Flomax) 0.4 mg 24 hr capsule, Take 1 capsule (0.4 mg) by mouth once daily., Disp: , Rfl:     testosterone 20.25 mg/1.25 gram (1.62 %) gel in metered-dose pump, Place 2 Pump on the skin once daily. 1 pump to each shoulder, Disp: 75 g, Rfl: 3      SOCIAL HISTORY:  Patient  reports that he has never smoked. He has never used smokeless tobacco. He reports that he does not currently use alcohol. He reports that he does not use drugs.   Social History     Socioeconomic History    Marital status:      Spouse name: Not on file    Number of children: Not on file    Years of education: Not on file    Highest education level: Not on file   Occupational History    Not on file   Tobacco Use    Smoking status: Never    Smokeless tobacco: Never    Tobacco comments:     No history of anesthesia reactions. No family history of MH.     No illnesses in the past 30 days.     Does not get SOB with a flight of stairs.     Does not use a cane, walker, or wheelchair.     Is able to lie flat for 30 minutes.     Substance and Sexual Activity    Alcohol use: Not Currently    Drug use: Never    Sexual activity: Not on file   Other Topics Concern    Not on file   Social History Narrative    Not on file     Social Drivers of Health     Financial Resource Strain: Not on file   Food Insecurity: Not on file   Transportation Needs: Not on file   Physical Activity: Not on file   Stress: Not on file   Social Connections: Not on file   Intimate Partner " Violence: Not on file   Housing Stability: Not on file       FAMILY HISTORY:  Family History   Problem Relation Name Age of Onset    Diabetes Father      Cancer Maternal Grandfather         REVIEW OF SYSTEMS:  Constitutional: Negative for fever and chills. Denies anorexia, weight loss.  Eyes: Negative for visual disturbance.   Respiratory: Negative for shortness of breath.    Cardiovascular: Negative for chest pain.   Gastrointestinal: Negative for nausea and vomiting.   Genitourinary: See interval history above.  Skin: Negative for rash.   Neurological: Negative for dizziness and numbness.   Psychiatric/Behavioral: Negative for confusion and decreased concentration.     PHYSICAL EXAM:  There were no vitals taken for this visit.  Constitutional: Patient appears well-developed and well-nourished. No distress.    Head: Normocephalic and atraumatic.    Neck: Normal range of motion.    Cardiovascular: Normal rate.    Pulmonary/Chest: Effort normal. No respiratory distress.   Abdominal: soft, NT, ND  Musculoskeletal: Normal range of motion.    Neurological: Alert and oriented to person, place, and time.  Psychiatric: Normal mood and affect. Behavior is normal. Thought content normal.      LABORATORY REVIEW:     Lab Results   Component Value Date    BUN 16 01/21/2025    CREATININE 1.00 01/21/2025    EGFR 85 01/21/2025     01/21/2025    K 4.5 01/21/2025     01/21/2025    CO2 29 01/21/2025    CALCIUM 9.8 01/21/2025      Lab Results   Component Value Date    WBC 6.6 04/02/2025    RBC 5.57 04/02/2025    HGB 15.5 04/02/2025    HCT 48.6 04/02/2025    MCV 87.3 04/02/2025    MCH 27.8 04/02/2025    MCHC 31.9 (L) 04/02/2025    RDW 14.2 04/02/2025     04/02/2025    MPV 9.7 04/02/2025        Lab Results   Component Value Date    PSA <0.04 03/31/2025    PSA <0.10 01/21/2025    PSA <0.10 10/08/2024    PSA <0.10 08/16/2024    PSA <0.10 04/29/2024    PSA <0.10 02/20/2024    PSA 4.17 (H) 06/01/2023    PSA 3.3 05/10/2022     PSA 1.95 05/18/2021    PSA 1.8 01/06/2021    PSA 5.17 (H) 11/09/2020    PSA 2.09 05/23/2020    PSA 1.74 11/02/2019    PSA 1.97 03/09/2019         Assessment:     1. Erectile dysfunction after radical prostatectomy  tadalafil (Cialis) 5 mg tablet    tadalafil 20 mg tablet        Plan:   Reviewed and interpreted patient's PSA trend, which remains undetectable.  His PVR today is 0 cc, and is emptying well.   Counseled patient on functional recovery after surgery and that he's improving   Continues daily 5mg cialis and 20mg Cialis PRN(30-40 min prior to sexual activity).   Will transition to 1 year surveillance      32 minutes total spent on patient's care today; >50% time spent on counseling/coordination of care    Scribe Attestation  By signing my name below, Gissel PEOPLES Scribe   attest that this documentation has been prepared under the direction and in the presence of Russell Soni MD.    Scribe Attestation  By signing my name below, Linda PEOPLES Scribe attest that this documentation has been prepared under the direction and in the presence of Russell Soni MD. All medical record entries made by the Scribnany were at my direction or personally dictated by me. I have reviewed the chart and agree that the record accurately reflects my personal performance of the history, physical exam, discussion and plan.

## 2025-05-28 ENCOUNTER — OFFICE VISIT (OUTPATIENT)
Dept: UROLOGY | Facility: HOSPITAL | Age: 65
End: 2025-05-28
Payer: COMMERCIAL

## 2025-05-28 DIAGNOSIS — N52.31 ERECTILE DYSFUNCTION AFTER RADICAL PROSTATECTOMY: ICD-10-CM

## 2025-05-28 PROCEDURE — 4010F ACE/ARB THERAPY RXD/TAKEN: CPT | Performed by: STUDENT IN AN ORGANIZED HEALTH CARE EDUCATION/TRAINING PROGRAM

## 2025-05-28 PROCEDURE — 99214 OFFICE O/P EST MOD 30 MIN: CPT | Performed by: STUDENT IN AN ORGANIZED HEALTH CARE EDUCATION/TRAINING PROGRAM

## 2025-05-28 PROCEDURE — 51798 US URINE CAPACITY MEASURE: CPT | Performed by: STUDENT IN AN ORGANIZED HEALTH CARE EDUCATION/TRAINING PROGRAM

## 2025-05-28 PROCEDURE — 3044F HG A1C LEVEL LT 7.0%: CPT | Performed by: STUDENT IN AN ORGANIZED HEALTH CARE EDUCATION/TRAINING PROGRAM

## 2025-05-28 RX ORDER — TADALAFIL 20 MG/1
20 TABLET ORAL DAILY PRN
Qty: 12 TABLET | Refills: 11 | Status: SHIPPED | OUTPATIENT
Start: 2025-05-28 | End: 2025-10-19

## 2025-05-28 RX ORDER — TADALAFIL 5 MG/1
5 TABLET ORAL DAILY
Qty: 30 TABLET | Refills: 11 | Status: SHIPPED | OUTPATIENT
Start: 2025-05-28 | End: 2026-05-23

## 2025-05-28 ASSESSMENT — ENCOUNTER SYMPTOMS
DEPRESSION: 0
OCCASIONAL FEELINGS OF UNSTEADINESS: 0
LOSS OF SENSATION IN FEET: 0

## 2025-05-29 ENCOUNTER — APPOINTMENT (OUTPATIENT)
Dept: ENDOCRINOLOGY | Facility: CLINIC | Age: 65
End: 2025-05-29
Payer: COMMERCIAL

## 2025-06-05 ENCOUNTER — TREATMENT (OUTPATIENT)
Dept: PHYSICAL THERAPY | Facility: CLINIC | Age: 65
End: 2025-06-05
Payer: COMMERCIAL

## 2025-06-05 DIAGNOSIS — G89.29 CHRONIC PAIN OF BOTH KNEES: ICD-10-CM

## 2025-06-05 DIAGNOSIS — M25.561 CHRONIC PAIN OF BOTH KNEES: ICD-10-CM

## 2025-06-05 DIAGNOSIS — M25.562 CHRONIC PAIN OF BOTH KNEES: ICD-10-CM

## 2025-06-05 PROCEDURE — 97530 THERAPEUTIC ACTIVITIES: CPT | Mod: GP | Performed by: INTERNAL MEDICINE

## 2025-06-05 PROCEDURE — 97110 THERAPEUTIC EXERCISES: CPT | Mod: GP | Performed by: INTERNAL MEDICINE

## 2025-06-05 NOTE — PROGRESS NOTES
Physical Therapy    Physical Therapy Treatment + Re-assessment & DC Note    Patient Name: Ko Porter  MRN: [id number]  Today's Date: 6/5/2025  Time Calculation  Start Time: 1530  Stop Time: 1559  Time Calculation (min): 29 min    Insurance - reviewed   Visit number: 4 (updated 06/05/25)   Payor: [employer]NE[employer] / Plan: HIGHMARK / Product Type: *No Product type* /    $20 COPAY   VISITS ARE MED NEC NO AUTH   Surgery: Extraction, Cataract, With Iol Insertion - Left, 12/30/2024.  Days since surgery: 157   Referring Provider: Enmanuel Lui MD     Current Problem  Problem List Items Addressed This Visit    None  Visit Diagnoses         Codes      Chronic pain of both knees     M25.561, M25.562, G89.29                Precautions:  Fall Risk: None    + prostate CA (02/2024); medically managed; still undergoing treatment. +DM; medically managed (pt on Ozempic). +HTN; medically managed. +blood thinner med; low-dose Aspirin.  Denies: CA, pacemaker, latex allergy, epilepsy/seizures, or other known cardio/neuro/pulmonary problems   Denies unexpected weight loss/gain, night sweats, or night pain.  Previous surgeries include:  none    General  Subjective      Ko Porter denies any falls or complications since last session. Ko Porter reports elevator is fixed and his knees feel better. Pt reports he hasn't noticed people walking faster than him lately.     Ko Porter reports poor compliance with HEP due to busy schedule    Pain:  0/10  Pain location: n/a     Objective   Outcome Measures:  Other Measures  Lower Extremity Funtional Score (LEFS): 50/80 --> 74/80     PGS: .78 m/s --> .88 m/s   FGS: 1.66 m/s --> 1.87 m/s     AROM (degrees)  R Knee: 0 - 115 --> 0 - 120   L Knee: 0 - 110 --> 0 - 120      Knee Strength:  Knee Extension R/L:  5/5, 5/5 --> same  Knee Flexion R/L: 5/5, 5/5 --> same      Hip Strength:   Hip Extension R/L:  2-/5,  2-/5  --> 4/5 , 4/5   Hip Abduction R/L:  4+/5,  4+/5   --> same   Hip Flexion  "R/L: 4/5,  4/5 --> 5/5 , 5/5      Gait: Pt ambulates independently without assistive device. Demos slowed alycia and decreased step length B --> same      SLS (R/L): 24.25\" / 3.37\" --> 28.85\" / 14.70\"       Stairs: Pt ascends/descends 4x6\" steps with BHrs and reciprocal gait pattern observed both ways. --> same except no Hrs required        Tenderness: no tenderness --> same         KEY  NT = not tested this visit  p! = pain  ~ = approximation    Treatments:  Abbreviation Key  NC = not completed this visit   NV = next visit  // = parallel    Assigned [employer] [id number]     Therapeutic Activity:  Completed RA this session. Please see objective and goals sections for more details.    Neuromuscular Re-ed: NC   Tandem standing on foam 2x30\" holds each side  SLS on foam: FWD/BWD tapping 20x each side  SLS on foam: MED/LAT tapping 20x each side     Therapeutic Exercise:  10 minutes    Nu-step lvl 4 x10 mins; LE warm-up, subjective, and goal review    The following were NC this session:   Resisted side stepping with green TB around ankles x4 laps  Resisted FWD/BWD monster walking with green TB around ankles x4 laps  B gastroc slant board 3x30\" holds  Seated HS stretch 2x30\" each side  Standing quad stretch 3x20\" each side  Full Leg Press (seat 5) 90# 20x   Single Leg Press (seat 5) 60# 20x  Verbal review only:  Supine Bridge  - 1 x daily - 7 x weekly - 3 sets - 10 reps    Outpatient Education: Reviewed home exercise program. Home exercise program instructed and issued. Answered questions about home exercise program. Provided manual cues for correct performance of exercises. Provided verbal feedback to improve exercise technique. Cues/rationale for there-ex.     Ko Porter verbalizes understanding of all education provided: Yes    Assessment:  Ko Porter has completed 4 sessions of physical therapy treatment with emphasis upon addressing B knee pain. He demonstrates improving symptoms with less active and " resting B knee pain. His B knee ROM has improved compared to initial evaluation along with his comfortable and fast gait speed, his hip strength, single leg standing balance, and . Currently, Ko Porter has met the majority of all established PT POC goals at this time. Recommend DC with HEP. Ko Porter voiced agreement and satisfaction with this plan.    Plan:  DC with HEP      Goals:  ST weeks  1. Patient will be independent with HEP to progress functional status  - 25: met      LTGs: 6 weeks  1. Patient will improve B hip extensor strength to >/=4+/5 for improved knee stability.  - 25: partially met     2. Patient will improve B knee AROM to >/=0-120 deg for improved knee mobility.  - 25: met     3. Patient will improve LEFS score to >/= 59/80  - 25: met     4. Patient will improve normal and fast walking speed >/= 0.13 m/s to indicate MCID for older adult population  - 25: partially met       Time Calculation  Start Time: 1530  Stop Time: 1559  Time Calculation (min): 29 min     PT Therapeutic Procedures Time Entry  Therapeutic Exercise Time Entry: 10  Therapeutic Activity Time Entry: 19

## 2025-06-17 ENCOUNTER — APPOINTMENT (OUTPATIENT)
Dept: PHYSICAL THERAPY | Facility: CLINIC | Age: 65
End: 2025-06-17
Payer: COMMERCIAL

## 2025-06-18 DIAGNOSIS — E11.69 TYPE 2 DIABETES MELLITUS WITH MORBID OBESITY: ICD-10-CM

## 2025-06-18 DIAGNOSIS — E66.01 TYPE 2 DIABETES MELLITUS WITH MORBID OBESITY: ICD-10-CM

## 2025-06-18 RX ORDER — INSULIN GLARGINE 100 [IU]/ML
INJECTION, SOLUTION SUBCUTANEOUS
Qty: 20 ML | Refills: 3 | Status: SHIPPED | OUTPATIENT
Start: 2025-06-18

## 2025-07-23 ENCOUNTER — APPOINTMENT (OUTPATIENT)
Dept: CARDIOLOGY | Facility: CLINIC | Age: 65
End: 2025-07-23
Payer: COMMERCIAL

## 2025-07-27 DIAGNOSIS — E11.69 TYPE 2 DIABETES MELLITUS WITH MORBID OBESITY: ICD-10-CM

## 2025-07-27 DIAGNOSIS — E66.01 TYPE 2 DIABETES MELLITUS WITH MORBID OBESITY: ICD-10-CM

## 2025-07-27 RX ORDER — BLOOD-GLUCOSE SENSOR
EACH MISCELLANEOUS
Qty: 9 EACH | Refills: 3 | Status: SHIPPED | OUTPATIENT
Start: 2025-07-27

## 2025-07-28 DIAGNOSIS — K21.9 GASTROESOPHAGEAL REFLUX DISEASE, UNSPECIFIED WHETHER ESOPHAGITIS PRESENT: ICD-10-CM

## 2025-07-28 DIAGNOSIS — I25.10 CORONARY ARTERY DISEASE INVOLVING NATIVE CORONARY ARTERY OF NATIVE HEART WITHOUT ANGINA PECTORIS: ICD-10-CM

## 2025-07-28 DIAGNOSIS — H25.812 COMBINED FORM OF AGE-RELATED CATARACT, LEFT EYE: ICD-10-CM

## 2025-07-28 DIAGNOSIS — E78.5 TYPE 2 DIABETES MELLITUS WITH HYPERLIPIDEMIA (MULTI): ICD-10-CM

## 2025-07-28 DIAGNOSIS — E11.69 TYPE 2 DIABETES MELLITUS WITH HYPERLIPIDEMIA (MULTI): ICD-10-CM

## 2025-07-31 RX ORDER — KETOROLAC TROMETHAMINE 5 MG/ML
1 SOLUTION OPHTHALMIC 4 TIMES DAILY
Qty: 5 ML | Refills: 1 | Status: SHIPPED | OUTPATIENT
Start: 2025-07-31 | End: 2025-08-15

## 2025-07-31 RX ORDER — PREDNISOLONE ACETATE 10 MG/ML
1 SUSPENSION/ DROPS OPHTHALMIC 4 TIMES DAILY
Qty: 5 ML | Refills: 1 | Status: SHIPPED | OUTPATIENT
Start: 2025-07-31 | End: 2025-08-08

## 2025-08-14 ENCOUNTER — TELEPHONE (OUTPATIENT)
Dept: PRIMARY CARE | Facility: CLINIC | Age: 65
End: 2025-08-14
Payer: COMMERCIAL

## 2025-08-15 DIAGNOSIS — C61 PROSTATE CANCER (MULTI): ICD-10-CM

## 2025-08-21 RX ORDER — FAMOTIDINE 40 MG/1
40 TABLET, FILM COATED ORAL
Qty: 90 TABLET | Refills: 1 | OUTPATIENT
Start: 2025-08-21 | End: 2026-02-17

## 2025-08-21 RX ORDER — LISINOPRIL 20 MG/1
20 TABLET ORAL DAILY
Qty: 90 TABLET | Refills: 1 | OUTPATIENT
Start: 2025-08-21

## 2025-08-21 RX ORDER — ATORVASTATIN CALCIUM 10 MG/1
10 TABLET, FILM COATED ORAL DAILY
Qty: 90 TABLET | Refills: 1 | OUTPATIENT
Start: 2025-08-21

## 2025-09-30 ENCOUNTER — APPOINTMENT (OUTPATIENT)
Dept: UROLOGY | Facility: CLINIC | Age: 65
End: 2025-09-30
Payer: COMMERCIAL

## 2025-10-06 ENCOUNTER — APPOINTMENT (OUTPATIENT)
Facility: CLINIC | Age: 65
End: 2025-10-06
Payer: COMMERCIAL

## 2025-10-17 ENCOUNTER — APPOINTMENT (OUTPATIENT)
Dept: PRIMARY CARE | Facility: CLINIC | Age: 65
End: 2025-10-17
Payer: COMMERCIAL

## 2025-12-16 ENCOUNTER — APPOINTMENT (OUTPATIENT)
Dept: OPHTHALMOLOGY | Facility: CLINIC | Age: 65
End: 2025-12-16
Payer: COMMERCIAL

## (undated) DEVICE — SUTURE, VICRYL, 2-0, 27 IN, SH, UNDYED

## (undated) DEVICE — SYRINGE, 60 CC, IRRIGATION, TOOMEY TIP

## (undated) DEVICE — SUTURE, MONOCRYL, 3-0, 27 IN, SH, UNDYED

## (undated) DEVICE — SCISSORS, MONOPOLAR, CURVED, 8MM

## (undated) DEVICE — SEALANT, HEMOSTATIC, FLOSEAL, 10 ML

## (undated) DEVICE — Device

## (undated) DEVICE — OBTURATOR, BLADELESS , SU

## (undated) DEVICE — SUTURE, MONOCRYL, 2-0, 27 IN, SH/V-20 , UNDYED

## (undated) DEVICE — CATHETER, URETHRAL, FOLEY, 2 WAY, BARDEX LUBRICATH, SHORT LENGTH, 18 FR, 5 CC, LATEX

## (undated) DEVICE — DRIVER, NEEDLE LARGE, DAVINCI XI

## (undated) DEVICE — SUTURE, V-LOC 3-0 V-20 6 GR 180 ABS"

## (undated) DEVICE — URONAV PROBE HOLDER, HITACHI FOR PP (FCS0147)

## (undated) DEVICE — FORCEPS, PROGRASP, DAVINCI XI

## (undated) DEVICE — PREP TRAY, SKIN, DRY, W/GLOVES

## (undated) DEVICE — POSITIONING, THE PINK PAD, PIGAZZI SYSTEM

## (undated) DEVICE — SUTURE, MONOCRYL, 27 IN, 3-0 SH, VIOLET

## (undated) DEVICE — EVACUATOR, WOUND, SUCTION, CLOSED, JACKSON-PRATT, 100 CC, SILICONE

## (undated) DEVICE — HANDPIECE,  IRRIGATION/ASPIRATION, 45DEG, 2.2MM-2.8MM, BLUE

## (undated) DEVICE — NEEDLE, INSUFFLATION, 13GAX120MM, DISP

## (undated) DEVICE — PUMP, STRYKERFLOW 2 & HANDPIECE W/10FT. IRRIGATION TUBING

## (undated) DEVICE — DRESSING, NON-ADHERENT, TELFA, OUCHLESS, 3 X 8 IN, STERILE

## (undated) DEVICE — GLOVE, SURGICAL, PROTEXIS PI , 6.5, PF, LF

## (undated) DEVICE — CATHETER, URETHRAL, FOLEY, 2 WAY, BARDEX IC, 18 FR, 5 CC, SILVER, LATEX

## (undated) DEVICE — RETRIEVAL SYSTEM, MONARCH, 10MM DISP ENDOSCOPIC

## (undated) DEVICE — SYRINGE, 60 CC, IRRIGATION, PISTON, CATH TIP, W/LUER ADAPTER,DISP

## (undated) DEVICE — CONTAINER, SPECIMEN, 120 ML, STERILE

## (undated) DEVICE — SUTURE, VICRYL, 0, 27 IN, UR-6, VIOLET

## (undated) DEVICE — CLIP, LIGATING, HEM-O-LOCK, MLX, LARGE, LF, PURPLE

## (undated) DEVICE — KNIFE, OPHTHALMIC, CRESCENT, ANGLED, BEVEL UP, SATIN

## (undated) DEVICE — COVER, TIP HOT SHEARS ENDOWRIST

## (undated) DEVICE — DRESSING, TRANSPARENT, TEGADERM, 2-3/8 X 2-3/4 IN

## (undated) DEVICE — ULTRASOUND LATEX FREE PROBE COVER

## (undated) DEVICE — SUTURE, V-LOC, 2-0, 9 IN, GS-22, 90 ABS

## (undated) DEVICE — DRAPE, INCISE, ANTIMICROBIAL, IOBAN 2, LARGE, 17 X 23 IN, DISPOSABLE, STERILE

## (undated) DEVICE — SUTURE, MONOCRYL, 4-0, 18 IN, PS2, UNDYED

## (undated) DEVICE — NEEDLE, HYPODERMIC, REGULAR WALL, REGULAR BEVEL, 18 G X 1.5 IN

## (undated) DEVICE — SOLUTION, IRRIGATION, USP, STERILE WATER, 500ML, BOTTLE

## (undated) DEVICE — DRAPE, ARM XI

## (undated) DEVICE — CARE KIT, LAPAROSCOPIC, ADVANCED

## (undated) DEVICE — STATLOCK, FOLEY SWIVEL, SILICONE TRICOT

## (undated) DEVICE — DRAPE, SHEET, THREE QUARTER, FAN FOLD, 57 X 77 IN

## (undated) DEVICE — SPONGE, HEMOSTATIC, CELLULOSE, SURGICEL, 2 X 14 IN

## (undated) DEVICE — SYRINGE, 10 CC, LUER LOCK

## (undated) DEVICE — NEEDLE, SPINAL, LONG, 20 G X 6 IN, YELLOW HUB

## (undated) DEVICE — STAPLER, SKIN PROXIMATE, 35 WIDE

## (undated) DEVICE — ACCESS SYS, KII SHIELDED BLADED, Z-THREAD, 5X100CM

## (undated) DEVICE — LUBRICANT, ELECTROLUBE, F/ELECTRODE TIPS

## (undated) DEVICE — CAUTERY, PENCIL, PUSH BUTTON, SMOKE EVAC, 70MM

## (undated) DEVICE — CANNULA, HYDRODISSECTION, MICRO, 25 G X 8 MM

## (undated) DEVICE — SOLUTION, IRRIGATION, USP, SODIUM CHLORIDE 0.9%, .9 NACL, 1000 ML, BAG

## (undated) DEVICE — SEAL, UNIVERSAL 5-8MM  XI

## (undated) DEVICE — ACCESS SYS, KII SHIELDED BLADED, Z-THREAD, 12X100CM

## (undated) DEVICE — APPLICATOR, ENDOSCOPIC FLOSEAL

## (undated) DEVICE — DRAPE, COLUMN, DAVINCI XI

## (undated) DEVICE — NEEDLE, SPINAL, QUINCKE, LUER LOCK, 18 G X 6 IN

## (undated) DEVICE — FORCEPS, BIPOLAR MARYLAND, DAVINCI XI